# Patient Record
Sex: FEMALE | Race: WHITE | NOT HISPANIC OR LATINO | Employment: FULL TIME | URBAN - METROPOLITAN AREA
[De-identification: names, ages, dates, MRNs, and addresses within clinical notes are randomized per-mention and may not be internally consistent; named-entity substitution may affect disease eponyms.]

---

## 2017-01-20 RX ORDER — LEVOTHYROXINE SODIUM 175 UG/1
TABLET ORAL
Qty: 90 TABLET | Refills: 2 | Status: SHIPPED | OUTPATIENT
Start: 2017-01-20 | End: 2017-04-10

## 2017-01-24 RX ORDER — OMEPRAZOLE 40 MG/1
CAPSULE, DELAYED RELEASE ORAL
Qty: 90 CAPSULE | Refills: 0 | Status: SHIPPED | OUTPATIENT
Start: 2017-01-24 | End: 2019-04-05

## 2017-04-10 ENCOUNTER — OFFICE VISIT (OUTPATIENT)
Dept: NEUROLOGY | Facility: CLINIC | Age: 42
End: 2017-04-10

## 2017-04-10 VITALS
DIASTOLIC BLOOD PRESSURE: 78 MMHG | SYSTOLIC BLOOD PRESSURE: 116 MMHG | WEIGHT: 220 LBS | BODY MASS INDEX: 33.34 KG/M2 | HEIGHT: 68 IN

## 2017-04-10 DIAGNOSIS — G43.009 MIGRAINE WITHOUT AURA AND WITHOUT STATUS MIGRAINOSUS, NOT INTRACTABLE: ICD-10-CM

## 2017-04-10 DIAGNOSIS — R25.1 TREMOR: Primary | ICD-10-CM

## 2017-04-10 PROCEDURE — 99213 OFFICE O/P EST LOW 20 MIN: CPT | Performed by: PSYCHIATRY & NEUROLOGY

## 2017-04-10 RX ORDER — TOPIRAMATE 25 MG/1
75 TABLET ORAL NIGHTLY
Qty: 270 TABLET | Refills: 3 | Status: SHIPPED | OUTPATIENT
Start: 2017-04-10 | End: 2018-03-28 | Stop reason: SDUPTHER

## 2017-04-10 RX ORDER — PROMETHAZINE HYDROCHLORIDE 25 MG/1
25 TABLET ORAL EVERY 6 HOURS PRN
Qty: 30 TABLET | Refills: 2 | Status: SHIPPED | OUTPATIENT
Start: 2017-04-10 | End: 2018-04-06 | Stop reason: SDUPTHER

## 2017-04-10 RX ORDER — LAMOTRIGINE 200 MG/1
200 TABLET ORAL DAILY
Refills: 3 | COMMUNITY
Start: 2017-03-14

## 2017-04-10 RX ORDER — RIZATRIPTAN BENZOATE 5 MG/1
5 TABLET ORAL ONCE AS NEEDED
Qty: 27 TABLET | Refills: 3 | Status: SHIPPED | OUTPATIENT
Start: 2017-04-10 | End: 2018-04-06 | Stop reason: SDUPTHER

## 2017-04-10 RX ORDER — ALPRAZOLAM 0.25 MG/1
0.25 TABLET ORAL 2 TIMES DAILY
Qty: 180 TABLET | Refills: 1 | Status: SHIPPED | OUTPATIENT
Start: 2017-04-10 | End: 2017-11-06 | Stop reason: SDUPTHER

## 2017-04-10 NOTE — PROGRESS NOTES
Subjective:     Patient ID: Altagracia Tiwari is a 42 y.o. female.    Migraine    Associated symptoms include back pain and nausea. Pertinent negatives include no abdominal pain, dizziness, fever, neck pain, numbness, seizures, vomiting or weakness.     The patient's migraines and tremor are fairly well controlled on her current medicine she is also now on Lamictal.     The following portions of the patient's history were reviewed and updated as appropriate: allergies, current medications, past family history, past medical history, past social history, past surgical history and problem list.    Review of Systems   Constitutional: Positive for fatigue. Negative for activity change, appetite change, chills, diaphoresis, fever and unexpected weight change.   Respiratory: Negative for shortness of breath.    Cardiovascular: Negative for chest pain and palpitations.   Gastrointestinal: Positive for nausea. Negative for abdominal pain and vomiting.   Musculoskeletal: Positive for back pain. Negative for neck pain.   Neurological: Positive for headaches. Negative for dizziness, tremors, seizures, syncope, facial asymmetry, speech difficulty, weakness, light-headedness and numbness.   Psychiatric/Behavioral: Negative.  Negative for confusion.        Objective:    Neurologic Exam  Mental status examination was appropriate.  Funduscopy, visual fields, eye movements and pupillary reflexes were normal.  No facial weakness was noted.  Gait was normal.  No pattern of focal weakness was noted.  Physical Exam    Assessment/Plan:     Altagracia was seen today for migraine.    Diagnoses and all orders for this visit:    Tremor    Migraine without aura and without status migrainosus, not intractable    Other orders  -     topiramate (TOPAMAX) 25 MG tablet; Take 3 tablets by mouth Every Night.  -     rizatriptan (MAXALT) 5 MG tablet; Take 1 tablet by mouth 1 (One) Time As Needed for migraine. May repeat in 2 hours if needed  -      promethazine (PHENERGAN) 25 MG tablet; Take 1 tablet by mouth Every 6 (Six) Hours As Needed for Nausea or Vomiting.  -     ALPRAZolam (XANAX) 0.25 MG tablet; Take 1 tablet by mouth 2 (Two) Times a Day.         Prescription drug management - meds as above.    Follow up in the office in one year.Thank you for allowing me to share in the care of this patient.  Chema Edge M.D.

## 2017-04-17 ENCOUNTER — HOSPITAL ENCOUNTER (OUTPATIENT)
Dept: FAMILY MEDICINE CLINIC | Facility: CLINIC | Age: 42
Setting detail: SPECIMEN
Discharge: HOME | End: 2017-04-17
Attending: NURSE PRACTITIONER | Admitting: NURSE PRACTITIONER

## 2017-04-17 LAB
ALBUMIN SERPL-MCNC: 4 G/DL (ref 3.5–4.8)
ALBUMIN/GLOB SERPL: 1.1 {RATIO} (ref 1–1.7)
ALP SERPL-CCNC: 76 IU/L (ref 32–91)
ALT SERPL-CCNC: 27 IU/L (ref 14–54)
ANION GAP SERPL CALC-SCNC: 15.5 MMOL/L (ref 10–20)
AST SERPL-CCNC: 22 IU/L (ref 15–41)
BILIRUB SERPL-MCNC: 0.6 MG/DL (ref 0.3–1.2)
BUN SERPL-MCNC: 14 MG/DL (ref 8–20)
BUN/CREAT SERPL: 11.7 (ref 5.4–26.2)
CALCIUM SERPL-MCNC: 9.6 MG/DL (ref 8.9–10.3)
CHLORIDE SERPL-SCNC: 107 MMOL/L (ref 101–111)
CHOLEST SERPL-MCNC: 182 MG/DL
CHOLEST/HDLC SERPL: 3.8 {RATIO}
CONV CO2: 23 MMOL/L (ref 22–32)
CONV LDL CHOLESTEROL DIRECT: 114 MG/DL (ref 0–100)
CONV TOTAL PROTEIN: 7.8 G/DL (ref 6.1–7.9)
CREAT UR-MCNC: 1.2 MG/DL (ref 0.4–1)
GLOBULIN UR ELPH-MCNC: 3.8 G/DL (ref 2.5–3.8)
GLUCOSE SERPL-MCNC: 92 MG/DL (ref 65–99)
HDLC SERPL-MCNC: 48 MG/DL
LDLC/HDLC SERPL: 2.4 {RATIO}
LIPID INTERPRETATION: ABNORMAL
POTASSIUM SERPL-SCNC: 4.5 MMOL/L (ref 3.6–5.1)
SODIUM SERPL-SCNC: 141 MMOL/L (ref 136–144)
TRIGL SERPL-MCNC: 113 MG/DL
TSH SERPL-ACNC: 2.25 UIU/ML (ref 0.34–5.6)
VLDLC SERPL CALC-MCNC: 20.1 MG/DL

## 2017-07-27 ENCOUNTER — HOSPITAL ENCOUNTER (OUTPATIENT)
Dept: FAMILY MEDICINE CLINIC | Facility: CLINIC | Age: 42
Setting detail: SPECIMEN
Discharge: HOME | End: 2017-07-27
Attending: FAMILY MEDICINE | Admitting: FAMILY MEDICINE

## 2017-07-27 LAB
ALBUMIN SERPL-MCNC: 3.9 G/DL (ref 3.5–4.8)
ALBUMIN/GLOB SERPL: 1.1 {RATIO} (ref 1–1.7)
ALP SERPL-CCNC: 72 IU/L (ref 32–91)
ALT SERPL-CCNC: 22 IU/L (ref 14–54)
ANION GAP SERPL CALC-SCNC: 13.2 MMOL/L (ref 10–20)
AST SERPL-CCNC: 18 IU/L (ref 15–41)
BASOPHILS # BLD AUTO: 0.1 10*3/UL (ref 0–0.2)
BASOPHILS NFR BLD AUTO: 1 % (ref 0–2)
BILIRUB SERPL-MCNC: 0.5 MG/DL (ref 0.3–1.2)
BUN SERPL-MCNC: 12 MG/DL (ref 8–20)
BUN/CREAT SERPL: 12 (ref 5.4–26.2)
CALCIUM SERPL-MCNC: 9.6 MG/DL (ref 8.9–10.3)
CHLORIDE SERPL-SCNC: 106 MMOL/L (ref 101–111)
CONV CO2: 24 MMOL/L (ref 22–32)
CONV TOTAL PROTEIN: 7.5 G/DL (ref 6.1–7.9)
CREAT UR-MCNC: 1 MG/DL (ref 0.4–1)
DIFFERENTIAL METHOD BLD: (no result)
EOSINOPHIL # BLD AUTO: 0.2 10*3/UL (ref 0–0.3)
EOSINOPHIL # BLD AUTO: 3 % (ref 0–3)
ERYTHROCYTE [DISTWIDTH] IN BLOOD BY AUTOMATED COUNT: 15 % (ref 11.5–14.5)
ERYTHROCYTE [SEDIMENTATION RATE] IN BLOOD BY WESTERGREN METHOD: 28 MM/HR (ref 0–20)
GLOBULIN UR ELPH-MCNC: 3.6 G/DL (ref 2.5–3.8)
GLUCOSE SERPL-MCNC: 107 MG/DL (ref 65–99)
HCT VFR BLD AUTO: 38.7 % (ref 35–49)
HGB BLD-MCNC: 12.9 G/DL (ref 12–15)
LYMPHOCYTES # BLD AUTO: 1.6 10*3/UL (ref 0.8–4.8)
LYMPHOCYTES NFR BLD AUTO: 27 % (ref 18–42)
MCH RBC QN AUTO: 27.6 PG (ref 26–32)
MCHC RBC AUTO-ENTMCNC: 33.4 G/DL (ref 32–36)
MCV RBC AUTO: 82.6 FL (ref 80–94)
MONOCYTES # BLD AUTO: 0.6 10*3/UL (ref 0.1–1.3)
MONOCYTES NFR BLD AUTO: 9 % (ref 2–11)
NEUTROPHILS # BLD AUTO: 3.6 10*3/UL (ref 2.3–8.6)
NEUTROPHILS NFR BLD AUTO: 60 % (ref 50–75)
NRBC BLD AUTO-RTO: 0 /100{WBCS}
NRBC/RBC NFR BLD MANUAL: 0 10*3/UL
PLATELET # BLD AUTO: 276 10*3/UL (ref 150–450)
PMV BLD AUTO: 8.4 FL (ref 7.4–10.4)
POTASSIUM SERPL-SCNC: 4.2 MMOL/L (ref 3.6–5.1)
RBC # BLD AUTO: 4.68 10*6/UL (ref 4–5.4)
SODIUM SERPL-SCNC: 139 MMOL/L (ref 136–144)
WBC # BLD AUTO: 6 10*3/UL (ref 4.5–11.5)

## 2017-09-18 RX ORDER — LEVOTHYROXINE SODIUM 175 UG/1
TABLET ORAL
Qty: 30 TABLET | Refills: 0 | Status: SHIPPED | OUTPATIENT
Start: 2017-09-18 | End: 2017-10-26 | Stop reason: SDUPTHER

## 2017-10-26 RX ORDER — LEVOTHYROXINE SODIUM 175 UG/1
TABLET ORAL
Qty: 15 TABLET | Refills: 0 | Status: SHIPPED | OUTPATIENT
Start: 2017-10-26 | End: 2023-02-11

## 2017-10-27 ENCOUNTER — HOSPITAL ENCOUNTER (OUTPATIENT)
Dept: FAMILY MEDICINE CLINIC | Facility: CLINIC | Age: 42
Setting detail: SPECIMEN
Discharge: HOME | End: 2017-10-27
Attending: FAMILY MEDICINE | Admitting: FAMILY MEDICINE

## 2017-11-03 RX ORDER — LEVOTHYROXINE SODIUM 175 UG/1
TABLET ORAL
Qty: 15 TABLET | Refills: 0 | OUTPATIENT
Start: 2017-11-03

## 2017-11-06 RX ORDER — ALPRAZOLAM 0.25 MG/1
TABLET ORAL
Qty: 180 TABLET | Refills: 1 | Status: SHIPPED | OUTPATIENT
Start: 2017-11-06 | End: 2018-04-06 | Stop reason: SDUPTHER

## 2017-11-13 ENCOUNTER — HOSPITAL ENCOUNTER (OUTPATIENT)
Dept: RHEUMATOLOGY | Facility: CLINIC | Age: 42
Discharge: HOME | End: 2017-11-13
Attending: INTERNAL MEDICINE | Admitting: INTERNAL MEDICINE

## 2017-11-13 ENCOUNTER — HOSPITAL ENCOUNTER (OUTPATIENT)
Dept: LAB | Facility: HOSPITAL | Age: 42
Discharge: HOME | End: 2017-11-13
Attending: INTERNAL MEDICINE | Admitting: INTERNAL MEDICINE

## 2017-11-13 LAB
ALBUMIN SERPL-MCNC: 3.8 G/DL (ref 3.5–4.8)
ALBUMIN/GLOB SERPL: 1 {RATIO} (ref 1–1.7)
ALP SERPL-CCNC: 67 IU/L (ref 32–91)
ALT SERPL-CCNC: 19 IU/L (ref 14–54)
ANION GAP SERPL CALC-SCNC: 12.1 MMOL/L (ref 10–20)
AST SERPL-CCNC: 18 IU/L (ref 15–41)
BASOPHILS # BLD AUTO: 0 10*3/UL (ref 0–0.2)
BASOPHILS NFR BLD AUTO: 1 % (ref 0–2)
BILIRUB SERPL-MCNC: 0.5 MG/DL (ref 0.3–1.2)
BILIRUB UR QL STRIP: NEGATIVE MG/DL
BUN SERPL-MCNC: 12 MG/DL (ref 8–20)
BUN/CREAT SERPL: 13.3 (ref 5.4–26.2)
CALCIUM SERPL-MCNC: 9.3 MG/DL (ref 8.9–10.3)
CASTS URNS QL MICRO: ABNORMAL /[LPF]
CHLORIDE SERPL-SCNC: 107 MMOL/L (ref 101–111)
COLOR UR: YELLOW
CONV BACTERIA IN URINE MICRO: NEGATIVE
CONV CLARITY OF URINE: CLEAR
CONV CO2: 22 MMOL/L (ref 22–32)
CONV HYALINE CASTS IN URINE MICRO: 2 /[LPF] (ref 0–5)
CONV PROTEIN IN URINE BY AUTOMATED TEST STRIP: NEGATIVE MG/DL
CONV SMALL ROUND CELLS: ABNORMAL /[HPF]
CONV TOTAL PROTEIN: 7.5 G/DL (ref 6.1–7.9)
CONV UROBILINOGEN IN URINE BY AUTOMATED TEST STRIP: 0.2 MG/DL
CREAT UR-MCNC: 0.9 MG/DL (ref 0.4–1)
CRP SERPL-MCNC: 1.87 MG/DL (ref 0–0.7)
CULTURE INDICATED?: ABNORMAL
DIFFERENTIAL METHOD BLD: (no result)
EOSINOPHIL # BLD AUTO: 0.2 10*3/UL (ref 0–0.3)
EOSINOPHIL # BLD AUTO: 4 % (ref 0–3)
ERYTHROCYTE [DISTWIDTH] IN BLOOD BY AUTOMATED COUNT: 15 % (ref 11.5–14.5)
ERYTHROCYTE [SEDIMENTATION RATE] IN BLOOD BY WESTERGREN METHOD: 46 MM/HR (ref 0–20)
GLOBULIN UR ELPH-MCNC: 3.7 G/DL (ref 2.5–3.8)
GLUCOSE SERPL-MCNC: 84 MG/DL (ref 65–99)
GLUCOSE UR QL: NEGATIVE MG/DL
HCT VFR BLD AUTO: 37.1 % (ref 35–49)
HGB BLD-MCNC: 12.4 G/DL (ref 12–15)
HGB UR QL STRIP: ABNORMAL
KETONES UR QL STRIP: NEGATIVE MG/DL
LEUKOCYTE ESTERASE UR QL STRIP: NEGATIVE
LYMPHOCYTES # BLD AUTO: 1.6 10*3/UL (ref 0.8–4.8)
LYMPHOCYTES NFR BLD AUTO: 31 % (ref 18–42)
MCH RBC QN AUTO: 27.8 PG (ref 26–32)
MCHC RBC AUTO-ENTMCNC: 33.4 G/DL (ref 32–36)
MCV RBC AUTO: 83.1 FL (ref 80–94)
MONOCYTES # BLD AUTO: 0.4 10*3/UL (ref 0.1–1.3)
MONOCYTES NFR BLD AUTO: 8 % (ref 2–11)
NEUTROPHILS # BLD AUTO: 3.1 10*3/UL (ref 2.3–8.6)
NEUTROPHILS NFR BLD AUTO: 56 % (ref 50–75)
NITRITE UR QL STRIP: NEGATIVE
NRBC BLD AUTO-RTO: 0 /100{WBCS}
NRBC/RBC NFR BLD MANUAL: 0 10*3/UL
PH UR STRIP.AUTO: 6.5 [PH] (ref 4.5–8)
PLATELET # BLD AUTO: 326 10*3/UL (ref 150–450)
PMV BLD AUTO: 8.7 FL (ref 7.4–10.4)
POTASSIUM SERPL-SCNC: 4.1 MMOL/L (ref 3.6–5.1)
RBC # BLD AUTO: 4.46 10*6/UL (ref 4–5.4)
RBC #/AREA URNS HPF: 1 /[HPF] (ref 0–3)
SODIUM SERPL-SCNC: 137 MMOL/L (ref 136–144)
SP GR UR: 1.02 (ref 1–1.03)
SPERM URNS QL MICRO: ABNORMAL /[HPF]
SQUAMOUS SPT QL MICRO: 3 /[HPF] (ref 0–5)
UNIDENT CRYS URNS QL MICRO: ABNORMAL /[HPF]
WBC # BLD AUTO: 5.3 10*3/UL (ref 4.5–11.5)
WBC #/AREA URNS HPF: 1 /[HPF] (ref 0–5)
YEAST SPEC QL WET PREP: ABNORMAL /[HPF]

## 2017-12-02 ENCOUNTER — HOSPITAL ENCOUNTER (OUTPATIENT)
Dept: MRI IMAGING | Facility: HOSPITAL | Age: 42
Discharge: HOME | End: 2017-12-02
Attending: ORTHOPAEDIC SURGERY | Admitting: ORTHOPAEDIC SURGERY

## 2018-01-19 ENCOUNTER — HOSPITAL ENCOUNTER (OUTPATIENT)
Dept: MAMMOGRAPHY | Facility: HOSPITAL | Age: 43
Discharge: HOME | End: 2018-01-19
Attending: FAMILY MEDICINE | Admitting: FAMILY MEDICINE

## 2018-03-09 ENCOUNTER — HOSPITAL ENCOUNTER (OUTPATIENT)
Dept: LAB | Facility: HOSPITAL | Age: 43
Discharge: HOME | End: 2018-03-09
Attending: INTERNAL MEDICINE | Admitting: INTERNAL MEDICINE

## 2018-03-28 RX ORDER — TOPIRAMATE 25 MG/1
75 TABLET ORAL NIGHTLY
Qty: 270 TABLET | Refills: 0 | Status: SHIPPED | OUTPATIENT
Start: 2018-03-28 | End: 2018-04-06 | Stop reason: SDUPTHER

## 2018-04-06 ENCOUNTER — OFFICE VISIT (OUTPATIENT)
Dept: NEUROLOGY | Facility: CLINIC | Age: 43
End: 2018-04-06

## 2018-04-06 VITALS
BODY MASS INDEX: 33.95 KG/M2 | HEIGHT: 68 IN | DIASTOLIC BLOOD PRESSURE: 80 MMHG | SYSTOLIC BLOOD PRESSURE: 120 MMHG | WEIGHT: 224 LBS

## 2018-04-06 DIAGNOSIS — G43.009 MIGRAINE WITHOUT AURA AND WITHOUT STATUS MIGRAINOSUS, NOT INTRACTABLE: ICD-10-CM

## 2018-04-06 DIAGNOSIS — R25.1 TREMOR: Primary | ICD-10-CM

## 2018-04-06 PROCEDURE — 99213 OFFICE O/P EST LOW 20 MIN: CPT | Performed by: PSYCHIATRY & NEUROLOGY

## 2018-04-06 RX ORDER — TRAZODONE HYDROCHLORIDE 100 MG/1
100 TABLET ORAL NIGHTLY
Refills: 0 | COMMUNITY
Start: 2018-03-20

## 2018-04-06 RX ORDER — TOPIRAMATE 25 MG/1
75 TABLET ORAL NIGHTLY
Qty: 270 TABLET | Refills: 3 | Status: SHIPPED | OUTPATIENT
Start: 2018-04-06 | End: 2018-04-17 | Stop reason: SDUPTHER

## 2018-04-06 RX ORDER — RIZATRIPTAN BENZOATE 5 MG/1
5 TABLET ORAL ONCE AS NEEDED
Qty: 27 TABLET | Refills: 3 | Status: SHIPPED | OUTPATIENT
Start: 2018-04-06 | End: 2018-04-17 | Stop reason: SDUPTHER

## 2018-04-06 RX ORDER — PROMETHAZINE HYDROCHLORIDE 25 MG/1
25 TABLET ORAL EVERY 6 HOURS PRN
Qty: 30 TABLET | Refills: 2 | Status: SHIPPED | OUTPATIENT
Start: 2018-04-06 | End: 2019-04-05 | Stop reason: SDUPTHER

## 2018-04-06 RX ORDER — ALPRAZOLAM 0.25 MG/1
0.25 TABLET ORAL 2 TIMES DAILY
Qty: 180 TABLET | Refills: 1 | Status: SHIPPED | OUTPATIENT
Start: 2018-04-06 | End: 2018-06-07 | Stop reason: SDUPTHER

## 2018-04-06 RX ORDER — ACETAMINOPHEN 500 MG
500 TABLET ORAL EVERY 6 HOURS PRN
COMMUNITY

## 2018-04-06 NOTE — PROGRESS NOTES
Subjective:     Patient ID: Altagracia Tiwari is a 43 y.o. female.    History of Present Illness     The patient was seen back in the office for follow-up of migraine and tremor.  Her tremor is still present but tolerable.  She did not tolerate propanolol in the past.  High-grade is under control with topiramate and Maxalt.  She does use Phenergan as a rescue.  The following portions of the patient's history were reviewed and updated as appropriate: allergies, current medications, past family history, past medical history, past social history, past surgical history and problem list.      Current Outpatient Prescriptions:   •  acetaminophen (TYLENOL) 500 MG tablet, Take 500 mg by mouth Every 6 (Six) Hours As Needed for Mild Pain ., Disp: , Rfl:   •  ADVAIR DISKUS 250-50 MCG/DOSE DISKUS, INHALE 1 PUFF BY MOUTH TWICE DAILY, Disp: 60 each, Rfl: 0  •  albuterol (ACCUNEB) 0.63 MG/3ML nebulizer solution, Take 3 mL by nebulization Every 6 (Six) Hours As Needed for wheezing., Disp: 3 mL, Rfl: 12  •  ALPRAZolam (XANAX) 0.25 MG tablet, Take 1 tablet by mouth 2 (Two) Times a Day., Disp: 180 tablet, Rfl: 1  •  Cholecalciferol (VITAMIN D3) 5000 UNITS tablet, Take by mouth., Disp: , Rfl:   •  Etanercept (ENBREL SURECLICK) 50 MG/ML solution auto-injector, Inject  under the skin., Disp: , Rfl:   •  lamoTRIgine (LaMICtal) 100 MG tablet, TK 1 T PO QD AT DINNER TIME, Disp: , Rfl: 3  •  levothyroxine (SYNTHROID, LEVOTHROID) 175 MCG tablet, TAKE 1 TABLET BY MOUTH EVERY DAY( NEED TO BE SEEN FOR FURTHER REFILLS), Disp: 15 tablet, Rfl: 0  •  naproxen (NAPROSYN) 500 MG tablet, Take 1 tablet by mouth 2 (two) times a day as needed for mild pain (1-3)., Disp: 28 tablet, Rfl: 0  •  NASACORT ALLERGY 24HR 55 MCG/ACT nasal inhaler, INSTILL 2 SPRAYS IEN ONCE DAILY, Disp: , Rfl: 0  •  omeprazole (priLOSEC) 40 MG capsule, TAKE 1 CAPSULE BY MOUTH EVERY DAY, Disp: 90 capsule, Rfl: 0  •  promethazine (PHENERGAN) 25 MG tablet, Take 1 tablet by mouth Every 6  (Six) Hours As Needed for Nausea or Vomiting., Disp: 30 tablet, Rfl: 2  •  rizatriptan (MAXALT) 5 MG tablet, Take 1 tablet by mouth 1 (One) Time As Needed for Migraine. May repeat in 2 hours if needed, Disp: 27 tablet, Rfl: 3  •  topiramate (TOPAMAX) 25 MG tablet, Take 3 tablets by mouth Every Night., Disp: 270 tablet, Rfl: 3  •  traZODone (DESYREL) 100 MG tablet, TK 1 T PO QHS, Disp: , Rfl: 0  •  venlafaxine XR (EFFEXOR-XR) 150 MG 24 hr capsule, , Disp: , Rfl: 1  •  VENTOLIN  (90 BASE) MCG/ACT inhaler, INHALE 1 TO 2 PUFFS BY MOUTH EVERY 6 HOURS AS NEEDED, Disp: 18 g, Rfl: 0    Review of Systems   Constitutional: Negative.    Neurological: Negative.    Psychiatric/Behavioral: Negative.         Objective:    Neurologic Exam  Mental status examination was appropriate.  Funduscopy, visual fields, eye movements and pupillary reflexes were normal.  No facial weakness was noted.  Gait was normal.  No pattern of focal weakness was noted.  Mild to moderate low amplitude high frequency tremor of hands.  Physical Exam    Assessment/Plan:     Altagracia was seen today for tremors and migraine.    Diagnoses and all orders for this visit:    Tremor    Migraine without aura and without status migrainosus, not intractable    Other orders  -     topiramate (TOPAMAX) 25 MG tablet; Take 3 tablets by mouth Every Night.  -     promethazine (PHENERGAN) 25 MG tablet; Take 1 tablet by mouth Every 6 (Six) Hours As Needed for Nausea or Vomiting.  -     rizatriptan (MAXALT) 5 MG tablet; Take 1 tablet by mouth 1 (One) Time As Needed for Migraine. May repeat in 2 hours if needed  -     ALPRAZolam (XANAX) 0.25 MG tablet; Take 1 tablet by mouth 2 (Two) Times a Day.         Prescription drug management - meds as above    Follow-up in the office in one year. Thank you for allowing me to share in the care of this patient.  Chema Edge M.D.

## 2018-04-14 ENCOUNTER — APPOINTMENT (OUTPATIENT)
Dept: GENERAL RADIOLOGY | Facility: HOSPITAL | Age: 43
End: 2018-04-14

## 2018-04-14 PROCEDURE — 73610 X-RAY EXAM OF ANKLE: CPT | Performed by: EMERGENCY MEDICINE

## 2018-04-16 ENCOUNTER — OFFICE VISIT (OUTPATIENT)
Dept: ORTHOPEDIC SURGERY | Facility: CLINIC | Age: 43
End: 2018-04-16

## 2018-04-16 VITALS — HEIGHT: 68 IN | WEIGHT: 224 LBS | BODY MASS INDEX: 33.95 KG/M2 | TEMPERATURE: 97.4 F

## 2018-04-16 DIAGNOSIS — M25.371 INSTABILITY OF RIGHT ANKLE JOINT: ICD-10-CM

## 2018-04-16 DIAGNOSIS — S93.401A SPRAIN OF RIGHT ANKLE, UNSPECIFIED LIGAMENT, INITIAL ENCOUNTER: ICD-10-CM

## 2018-04-16 DIAGNOSIS — S86.301A INJURY OF PERONEAL TENDON OF RIGHT FOOT, INITIAL ENCOUNTER: ICD-10-CM

## 2018-04-16 DIAGNOSIS — M79.671 PAIN OF RIGHT HEEL: Primary | ICD-10-CM

## 2018-04-16 PROCEDURE — 99204 OFFICE O/P NEW MOD 45 MIN: CPT | Performed by: ORTHOPAEDIC SURGERY
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
## 2018-04-17 RX ORDER — RIZATRIPTAN BENZOATE 5 MG/1
5 TABLET ORAL ONCE AS NEEDED
Qty: 27 TABLET | Refills: 3 | Status: SHIPPED | OUTPATIENT
Start: 2018-04-17 | End: 2019-04-05 | Stop reason: SDUPTHER

## 2018-04-17 RX ORDER — TOPIRAMATE 25 MG/1
75 TABLET ORAL NIGHTLY
Qty: 270 TABLET | Refills: 3 | Status: SHIPPED | OUTPATIENT
Start: 2018-04-17 | End: 2019-04-05 | Stop reason: SDUPTHER

## 2018-04-20 ENCOUNTER — HOSPITAL ENCOUNTER (OUTPATIENT)
Dept: MRI IMAGING | Facility: HOSPITAL | Age: 43
Discharge: HOME | End: 2018-04-20
Attending: ORTHOPAEDIC SURGERY | Admitting: ORTHOPAEDIC SURGERY

## 2018-04-20 DIAGNOSIS — M79.671 PAIN OF RIGHT HEEL: ICD-10-CM

## 2018-04-20 DIAGNOSIS — S93.401A SPRAIN OF RIGHT ANKLE, UNSPECIFIED LIGAMENT, INITIAL ENCOUNTER: ICD-10-CM

## 2018-04-20 DIAGNOSIS — S86.301A INJURY OF PERONEAL TENDON OF RIGHT FOOT, INITIAL ENCOUNTER: ICD-10-CM

## 2018-04-20 DIAGNOSIS — M25.371 INSTABILITY OF RIGHT ANKLE JOINT: ICD-10-CM

## 2018-04-20 PROCEDURE — 73721 MRI JNT OF LWR EXTRE W/O DYE: CPT

## 2018-04-25 ENCOUNTER — HOSPITAL ENCOUNTER (OUTPATIENT)
Dept: FAMILY MEDICINE CLINIC | Facility: CLINIC | Age: 43
Setting detail: SPECIMEN
Discharge: HOME | End: 2018-04-25
Attending: FAMILY MEDICINE | Admitting: FAMILY MEDICINE

## 2018-05-02 ENCOUNTER — OFFICE VISIT (OUTPATIENT)
Dept: ORTHOPEDIC SURGERY | Facility: CLINIC | Age: 43
End: 2018-05-02

## 2018-05-02 VITALS — TEMPERATURE: 98.1 F | BODY MASS INDEX: 33.95 KG/M2 | HEIGHT: 68 IN | WEIGHT: 224 LBS

## 2018-05-02 DIAGNOSIS — S93.401D SPRAIN OF RIGHT ANKLE, UNSPECIFIED LIGAMENT, SUBSEQUENT ENCOUNTER: ICD-10-CM

## 2018-05-02 DIAGNOSIS — M19.079 ARTHRITIS OF ANKLE: ICD-10-CM

## 2018-05-02 DIAGNOSIS — Q66.89 COALITION, TALOCALCANEAL: Primary | ICD-10-CM

## 2018-05-02 DIAGNOSIS — M24.071 LOOSE BODY OF RIGHT ANKLE: ICD-10-CM

## 2018-05-02 PROCEDURE — 99213 OFFICE O/P EST LOW 20 MIN: CPT | Performed by: ORTHOPAEDIC SURGERY
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
## 2018-05-11 ENCOUNTER — APPOINTMENT (OUTPATIENT)
Dept: GENERAL RADIOLOGY | Facility: HOSPITAL | Age: 43
End: 2018-05-11
Attending: ORTHOPAEDIC SURGERY

## 2018-05-15 ENCOUNTER — HOSPITAL ENCOUNTER (OUTPATIENT)
Dept: GENERAL RADIOLOGY | Facility: HOSPITAL | Age: 43
Discharge: HOME | End: 2018-05-15
Attending: ORTHOPAEDIC SURGERY | Admitting: ORTHOPAEDIC SURGERY

## 2018-05-15 DIAGNOSIS — Q66.89 COALITION, TALOCALCANEAL: ICD-10-CM

## 2018-05-15 PROCEDURE — 77002 NEEDLE LOCALIZATION BY XRAY: CPT

## 2018-05-15 PROCEDURE — 25010000002 IOPAMIDOL 61 % SOLUTION: Performed by: RADIOLOGY

## 2018-05-15 PROCEDURE — 25010000002 METHYLPREDNISOLONE PER 125 MG: Performed by: RADIOLOGY

## 2018-05-15 RX ORDER — BUPIVACAINE HYDROCHLORIDE 2.5 MG/ML
10 INJECTION, SOLUTION EPIDURAL; INFILTRATION; INTRACAUDAL ONCE
Status: COMPLETED | OUTPATIENT
Start: 2018-05-15 | End: 2018-05-15

## 2018-05-15 RX ORDER — LIDOCAINE HYDROCHLORIDE 10 MG/ML
10 INJECTION, SOLUTION INFILTRATION; PERINEURAL ONCE
Status: COMPLETED | OUTPATIENT
Start: 2018-05-15 | End: 2018-05-15

## 2018-05-15 RX ORDER — METHYLPREDNISOLONE SODIUM SUCCINATE 125 MG/2ML
80 INJECTION, POWDER, LYOPHILIZED, FOR SOLUTION INTRAMUSCULAR; INTRAVENOUS
Status: COMPLETED | OUTPATIENT
Start: 2018-05-15 | End: 2018-05-15

## 2018-05-15 RX ADMIN — BUPIVACAINE HYDROCHLORIDE 3 ML: 2.5 INJECTION, SOLUTION EPIDURAL; INFILTRATION; INTRACAUDAL; PERINEURAL at 11:41

## 2018-05-15 RX ADMIN — LIDOCAINE HYDROCHLORIDE 1 ML: 10 INJECTION, SOLUTION INFILTRATION; PERINEURAL at 11:41

## 2018-05-15 RX ADMIN — METHYLPREDNISOLONE SODIUM SUCCINATE 80 MG: 125 INJECTION, POWDER, LYOPHILIZED, FOR SOLUTION INTRAMUSCULAR; INTRAVENOUS at 11:41

## 2018-05-15 RX ADMIN — IOPAMIDOL 1 ML: 612 INJECTION, SOLUTION INTRAVENOUS at 11:41

## 2018-06-04 ENCOUNTER — OFFICE VISIT (OUTPATIENT)
Dept: ORTHOPEDIC SURGERY | Facility: CLINIC | Age: 43
End: 2018-06-04

## 2018-06-04 VITALS — TEMPERATURE: 97.1 F | HEIGHT: 68 IN | WEIGHT: 224.2 LBS | BODY MASS INDEX: 33.98 KG/M2

## 2018-06-04 DIAGNOSIS — M24.071 LOOSE BODY OF RIGHT ANKLE: ICD-10-CM

## 2018-06-04 DIAGNOSIS — Q66.89 COALITION, TALOCALCANEAL: ICD-10-CM

## 2018-06-04 DIAGNOSIS — M19.079 ARTHRITIS OF ANKLE: ICD-10-CM

## 2018-06-04 DIAGNOSIS — S93.401D SPRAIN OF RIGHT ANKLE, UNSPECIFIED LIGAMENT, SUBSEQUENT ENCOUNTER: Primary | ICD-10-CM

## 2018-06-04 PROCEDURE — 99213 OFFICE O/P EST LOW 20 MIN: CPT | Performed by: ORTHOPAEDIC SURGERY
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
## 2018-06-07 RX ORDER — ALPRAZOLAM 0.25 MG/1
0.25 TABLET ORAL 2 TIMES DAILY
Qty: 180 TABLET | Refills: 1 | Status: SHIPPED | OUTPATIENT
Start: 2018-06-07 | End: 2019-01-03 | Stop reason: SDUPTHER

## 2018-06-07 NOTE — TELEPHONE ENCOUNTER
Please duo this. Patient is changing pharmacies and needs new RX. I have called former pharmacy to cx previous rx

## 2018-06-14 ENCOUNTER — HOSPITAL ENCOUNTER (OUTPATIENT)
Dept: LAB | Facility: HOSPITAL | Age: 43
Discharge: HOME | End: 2018-06-14
Attending: INTERNAL MEDICINE | Admitting: INTERNAL MEDICINE

## 2018-06-14 LAB
ALBUMIN SERPL-MCNC: 3.8 G/DL (ref 3.5–4.8)
ALBUMIN/GLOB SERPL: 0.9 {RATIO} (ref 1–1.7)
ALP SERPL-CCNC: 80 IU/L (ref 32–91)
ALT SERPL-CCNC: 16 IU/L (ref 14–54)
ANION GAP SERPL CALC-SCNC: 9 MMOL/L (ref 10–20)
AST SERPL-CCNC: 17 IU/L (ref 15–41)
BACTERIA SPEC AEROBE CULT: NORMAL
BASOPHILS # BLD AUTO: 0 10*3/UL (ref 0–0.2)
BASOPHILS NFR BLD AUTO: 1 % (ref 0–2)
BILIRUB SERPL-MCNC: 0.4 MG/DL (ref 0.3–1.2)
BILIRUB UR QL STRIP: NEGATIVE MG/DL
BUN SERPL-MCNC: 14 MG/DL (ref 8–20)
BUN/CREAT SERPL: 15.6 (ref 5.4–26.2)
CALCIUM SERPL-MCNC: 9.6 MG/DL (ref 8.9–10.3)
CASTS URNS QL MICRO: ABNORMAL /[LPF]
CHLORIDE SERPL-SCNC: 106 MMOL/L (ref 101–111)
COLOR UR: YELLOW
CONV BACTERIA IN URINE MICRO: ABNORMAL
CONV CLARITY OF URINE: ABNORMAL
CONV CO2: 26 MMOL/L (ref 22–32)
CONV HYALINE CASTS IN URINE MICRO: ABNORMAL /[LPF] (ref 0–5)
CONV PROTEIN IN URINE BY AUTOMATED TEST STRIP: NEGATIVE MG/DL
CONV SMALL ROUND CELLS: ABNORMAL /[HPF]
CONV TOTAL PROTEIN: 8.1 G/DL (ref 6.1–7.9)
CONV UROBILINOGEN IN URINE BY AUTOMATED TEST STRIP: 1 MG/DL
CREAT UR-MCNC: 0.9 MG/DL (ref 0.4–1)
CRP SERPL-MCNC: 2.08 MG/DL (ref 0–0.7)
CULTURE INDICATED?: ABNORMAL
DIFFERENTIAL METHOD BLD: (no result)
EOSINOPHIL # BLD AUTO: 0.1 10*3/UL (ref 0–0.3)
EOSINOPHIL # BLD AUTO: 2 % (ref 0–3)
ERYTHROCYTE [DISTWIDTH] IN BLOOD BY AUTOMATED COUNT: 14.7 % (ref 11.5–14.5)
ERYTHROCYTE [SEDIMENTATION RATE] IN BLOOD BY WESTERGREN METHOD: 70 MM/HR (ref 0–20)
GLOBULIN UR ELPH-MCNC: 4.3 G/DL (ref 2.5–3.8)
GLUCOSE SERPL-MCNC: 81 MG/DL (ref 65–99)
GLUCOSE UR QL: NEGATIVE MG/DL
HCT VFR BLD AUTO: 36.3 % (ref 35–49)
HGB BLD-MCNC: 12 G/DL (ref 12–15)
HGB UR QL STRIP: NEGATIVE
KETONES UR QL STRIP: NEGATIVE MG/DL
LEUKOCYTE ESTERASE UR QL STRIP: NEGATIVE
LYMPHOCYTES # BLD AUTO: 1.7 10*3/UL (ref 0.8–4.8)
LYMPHOCYTES NFR BLD AUTO: 26 % (ref 18–42)
Lab: NORMAL
MCH RBC QN AUTO: 26.9 PG (ref 26–32)
MCHC RBC AUTO-ENTMCNC: 33.1 G/DL (ref 32–36)
MCV RBC AUTO: 81.5 FL (ref 80–94)
MICRO REPORT STATUS: NORMAL
MONOCYTES # BLD AUTO: 0.5 10*3/UL (ref 0.1–1.3)
MONOCYTES NFR BLD AUTO: 8 % (ref 2–11)
NEUTROPHILS # BLD AUTO: 4.2 10*3/UL (ref 2.3–8.6)
NEUTROPHILS NFR BLD AUTO: 63 % (ref 50–75)
NITRITE UR QL STRIP: NEGATIVE
NRBC BLD AUTO-RTO: 0 /100{WBCS}
NRBC/RBC NFR BLD MANUAL: 0 10*3/UL
PH UR STRIP.AUTO: 6 [PH] (ref 4.5–8)
PLATELET # BLD AUTO: 376 10*3/UL (ref 150–450)
PMV BLD AUTO: 8 FL (ref 7.4–10.4)
POTASSIUM SERPL-SCNC: 4 MMOL/L (ref 3.6–5.1)
RBC # BLD AUTO: 4.45 10*6/UL (ref 4–5.4)
RBC #/AREA URNS HPF: 2 /[HPF] (ref 0–3)
SODIUM SERPL-SCNC: 137 MMOL/L (ref 136–144)
SP GR UR: 1.02 (ref 1–1.03)
SPECIMEN SOURCE: NORMAL
SPERM URNS QL MICRO: ABNORMAL /[HPF]
SQUAMOUS SPT QL MICRO: 7 /[HPF] (ref 0–5)
UNIDENT CRYS URNS QL MICRO: ABNORMAL /[HPF]
WBC # BLD AUTO: 6.5 10*3/UL (ref 4.5–11.5)
WBC #/AREA URNS HPF: 6 /[HPF] (ref 0–5)
YEAST SPEC QL WET PREP: ABNORMAL /[HPF]

## 2018-06-14 PROCEDURE — 86481 TB AG RESPONSE T-CELL SUSP: CPT

## 2018-06-15 ENCOUNTER — LAB REQUISITION (OUTPATIENT)
Dept: LAB | Facility: HOSPITAL | Age: 43
End: 2018-06-15

## 2018-06-15 DIAGNOSIS — Z00.00 ROUTINE GENERAL MEDICAL EXAMINATION AT A HEALTH CARE FACILITY: ICD-10-CM

## 2018-06-15 LAB
HAV IGM SERPL QL IA: NONREACTIVE
HBV CORE IGM SERPL QL IA: NONREACTIVE
HBV SURFACE AG SERPL QL IA: NONREACTIVE
HCV AB SER DONR QL: NORMAL
HCV AB SER DONR QL: NORMAL

## 2018-06-16 LAB
TSPOT INTERPRETATION: NEGATIVE
TSPOT NIL CONTROL INTERPRETATION: NORMAL
TSPOT PANEL A: 0
TSPOT PANEL B: 0
TSPOT POS CONTROL INTERPRETATION: NORMAL

## 2018-06-18 LAB — TSPOT INTERPRETATION: NORMAL

## 2018-10-25 ENCOUNTER — HOSPITAL ENCOUNTER (OUTPATIENT)
Dept: LAB | Facility: HOSPITAL | Age: 43
Discharge: HOME | End: 2018-10-25
Attending: INTERNAL MEDICINE | Admitting: INTERNAL MEDICINE

## 2018-10-25 LAB
ALBUMIN SERPL-MCNC: 3.5 G/DL (ref 3.5–4.8)
ALBUMIN/GLOB SERPL: 1 {RATIO} (ref 1–1.7)
ALP SERPL-CCNC: 78 IU/L (ref 32–91)
ALT SERPL-CCNC: 18 IU/L (ref 14–54)
ANION GAP SERPL CALC-SCNC: 11.1 MMOL/L (ref 10–20)
AST SERPL-CCNC: 16 IU/L (ref 15–41)
BASOPHILS # BLD AUTO: 0 10*3/UL (ref 0–0.2)
BASOPHILS NFR BLD AUTO: 1 % (ref 0–2)
BILIRUB SERPL-MCNC: 0.4 MG/DL (ref 0.3–1.2)
BILIRUB UR QL STRIP: NEGATIVE MG/DL
BUN SERPL-MCNC: 10 MG/DL (ref 8–20)
BUN/CREAT SERPL: 12.5 (ref 5.4–26.2)
CALCIUM SERPL-MCNC: 9.1 MG/DL (ref 8.9–10.3)
CASTS URNS QL MICRO: NORMAL /[LPF]
CHLORIDE SERPL-SCNC: 108 MMOL/L (ref 101–111)
COLOR UR: YELLOW
CONV BACTERIA IN URINE MICRO: NEGATIVE
CONV CLARITY OF URINE: CLEAR
CONV CO2: 25 MMOL/L (ref 22–32)
CONV HYALINE CASTS IN URINE MICRO: 2 /[LPF] (ref 0–5)
CONV PROTEIN IN URINE BY AUTOMATED TEST STRIP: NEGATIVE MG/DL
CONV SMALL ROUND CELLS: NORMAL /[HPF]
CONV TOTAL PROTEIN: 7.1 G/DL (ref 6.1–7.9)
CONV UROBILINOGEN IN URINE BY AUTOMATED TEST STRIP: 0.2 MG/DL
CREAT UR-MCNC: 0.8 MG/DL (ref 0.4–1)
CRP SERPL-MCNC: 0.54 MG/DL (ref 0–0.7)
CULTURE INDICATED?: NORMAL
DIFFERENTIAL METHOD BLD: (no result)
EOSINOPHIL # BLD AUTO: 0.1 10*3/UL (ref 0–0.3)
EOSINOPHIL # BLD AUTO: 3 % (ref 0–3)
ERYTHROCYTE [DISTWIDTH] IN BLOOD BY AUTOMATED COUNT: 15 % (ref 11.5–14.5)
ERYTHROCYTE [SEDIMENTATION RATE] IN BLOOD BY WESTERGREN METHOD: 34 MM/HR (ref 0–20)
GLOBULIN UR ELPH-MCNC: 3.6 G/DL (ref 2.5–3.8)
GLUCOSE SERPL-MCNC: 98 MG/DL (ref 65–99)
GLUCOSE UR QL: NEGATIVE MG/DL
HAV IGM SERPL QL IA: NONREACTIVE
HBV CORE IGM SERPL QL IA: NONREACTIVE
HBV SURFACE AG SERPL QL IA: NONREACTIVE
HCT VFR BLD AUTO: 36.9 % (ref 35–49)
HCV AB SER DONR QL: NONREACTIVE
HGB BLD-MCNC: 12 G/DL (ref 12–15)
HGB UR QL STRIP: NEGATIVE
KETONES UR QL STRIP: NEGATIVE MG/DL
LEUKOCYTE ESTERASE UR QL STRIP: NEGATIVE
LYMPHOCYTES # BLD AUTO: 1.2 10*3/UL (ref 0.8–4.8)
LYMPHOCYTES NFR BLD AUTO: 26 % (ref 18–42)
MCH RBC QN AUTO: 26.8 PG (ref 26–32)
MCHC RBC AUTO-ENTMCNC: 32.6 G/DL (ref 32–36)
MCV RBC AUTO: 82.2 FL (ref 80–94)
MONOCYTES # BLD AUTO: 0.5 10*3/UL (ref 0.1–1.3)
MONOCYTES NFR BLD AUTO: 10 % (ref 2–11)
NEUTROPHILS # BLD AUTO: 2.8 10*3/UL (ref 2.3–8.6)
NEUTROPHILS NFR BLD AUTO: 60 % (ref 50–75)
NITRITE UR QL STRIP: NEGATIVE
NRBC BLD AUTO-RTO: 0 /100{WBCS}
NRBC/RBC NFR BLD MANUAL: 0 10*3/UL
PH UR STRIP.AUTO: 7.5 [PH] (ref 4.5–8)
PLATELET # BLD AUTO: 294 10*3/UL (ref 150–450)
PMV BLD AUTO: 8.7 FL (ref 7.4–10.4)
POTASSIUM SERPL-SCNC: 4.1 MMOL/L (ref 3.6–5.1)
RBC # BLD AUTO: 4.49 10*6/UL (ref 4–5.4)
RBC #/AREA URNS HPF: 0 /[HPF] (ref 0–3)
SODIUM SERPL-SCNC: 140 MMOL/L (ref 136–144)
SP GR UR: 1.01 (ref 1–1.03)
SPERM URNS QL MICRO: NORMAL /[HPF]
SQUAMOUS SPT QL MICRO: 2 /[HPF] (ref 0–5)
UNIDENT CRYS URNS QL MICRO: NORMAL /[HPF]
WBC # BLD AUTO: 4.7 10*3/UL (ref 4.5–11.5)
WBC #/AREA URNS HPF: 0 /[HPF] (ref 0–5)
YEAST SPEC QL WET PREP: NORMAL /[HPF]

## 2018-10-25 PROCEDURE — 86481 TB AG RESPONSE T-CELL SUSP: CPT

## 2018-10-26 ENCOUNTER — LAB REQUISITION (OUTPATIENT)
Dept: LAB | Facility: HOSPITAL | Age: 43
End: 2018-10-26

## 2018-10-26 DIAGNOSIS — Z00.00 ROUTINE GENERAL MEDICAL EXAMINATION AT A HEALTH CARE FACILITY: ICD-10-CM

## 2018-10-27 LAB
TSPOT INTERPRETATION: NEGATIVE
TSPOT NIL CONTROL INTERPRETATION: NORMAL
TSPOT PANEL A: 1
TSPOT PANEL B: 0
TSPOT POS CONTROL INTERPRETATION: NORMAL

## 2018-10-29 LAB — TSPOT INTERPRETATION: NEGATIVE

## 2018-12-31 ENCOUNTER — TELEPHONE (OUTPATIENT)
Dept: ORTHOPEDIC SURGERY | Facility: CLINIC | Age: 43
End: 2018-12-31

## 2018-12-31 NOTE — TELEPHONE ENCOUNTER
I spoke with patient she said that her ankle continues to do well and needs a work note that she may work without restrictions regarding the right ankle.  Also reviewed with her that as a had not seen her for her knee formally that I could not comment on that as far as work restrictions she said she understands and we will mail a work note to her regarding her right ankle

## 2018-12-31 NOTE — TELEPHONE ENCOUNTER
Regarding: Non-Urgent Medical Question  Contact: [phone]  ----- Message from Mychart, Generic sent at 12/29/2018  1:04 PM EST -----    I am taking a new position at [employer] and they are requesting a statement regarding my ankle that I have no work restrictions.  Also since I have not seen you regarding my knee would you be able to include this  in the statement also?  Let me know if you can do this for me it would be great.     Thanks,    Altagracia Tiwari

## 2019-01-04 RX ORDER — ALPRAZOLAM 0.25 MG/1
TABLET ORAL
Qty: 180 TABLET | Refills: 1 | Status: SHIPPED | OUTPATIENT
Start: 2019-01-04 | End: 2019-04-05 | Stop reason: SDUPTHER

## 2019-01-07 ENCOUNTER — TELEPHONE (OUTPATIENT)
Dept: NEUROLOGY | Facility: CLINIC | Age: 44
End: 2019-01-07

## 2019-01-07 NOTE — TELEPHONE ENCOUNTER
----- Message from Isaura Jones sent at 1/7/2019 12:33 PM EST -----  Contact: [phone]  Altagracia needs a work note stating that she is not restricted by her tremors or migraines.

## 2019-03-19 ENCOUNTER — HOSPITAL ENCOUNTER (OUTPATIENT)
Dept: LAB | Facility: HOSPITAL | Age: 44
Discharge: HOME | End: 2019-03-19
Attending: INTERNAL MEDICINE | Admitting: INTERNAL MEDICINE

## 2019-03-19 PROCEDURE — 86481 TB AG RESPONSE T-CELL SUSP: CPT

## 2019-03-20 ENCOUNTER — LAB REQUISITION (OUTPATIENT)
Dept: LAB | Facility: HOSPITAL | Age: 44
End: 2019-03-20

## 2019-03-20 DIAGNOSIS — Z00.00 ROUTINE GENERAL MEDICAL EXAMINATION AT A HEALTH CARE FACILITY: ICD-10-CM

## 2019-03-21 LAB
TSPOT INTERPRETATION: NEGATIVE
TSPOT INTERPRETATION: NORMAL
TSPOT NIL CONTROL INTERPRETATION: NORMAL
TSPOT PANEL A: 0
TSPOT PANEL B: 0
TSPOT POS CONTROL INTERPRETATION: NORMAL

## 2019-04-05 ENCOUNTER — OFFICE VISIT (OUTPATIENT)
Dept: NEUROLOGY | Facility: CLINIC | Age: 44
End: 2019-04-05

## 2019-04-05 VITALS
SYSTOLIC BLOOD PRESSURE: 105 MMHG | DIASTOLIC BLOOD PRESSURE: 60 MMHG | BODY MASS INDEX: 34.86 KG/M2 | WEIGHT: 230 LBS | HEIGHT: 68 IN

## 2019-04-05 DIAGNOSIS — R25.1 TREMOR: Primary | ICD-10-CM

## 2019-04-05 PROCEDURE — 99213 OFFICE O/P EST LOW 20 MIN: CPT | Performed by: PSYCHIATRY & NEUROLOGY

## 2019-04-05 RX ORDER — ALPRAZOLAM 0.25 MG/1
0.25 TABLET ORAL 2 TIMES DAILY
Qty: 180 TABLET | Refills: 1 | Status: SHIPPED | OUTPATIENT
Start: 2019-04-05 | End: 2019-12-05 | Stop reason: SDUPTHER

## 2019-04-05 RX ORDER — PROMETHAZINE HYDROCHLORIDE 25 MG/1
25 TABLET ORAL EVERY 6 HOURS PRN
Qty: 30 TABLET | Refills: 2 | Status: SHIPPED | OUTPATIENT
Start: 2019-04-05 | End: 2020-05-19 | Stop reason: SDUPTHER

## 2019-04-05 RX ORDER — ESOMEPRAZOLE MAGNESIUM 40 MG/1
40 CAPSULE, DELAYED RELEASE ORAL
COMMUNITY

## 2019-04-05 RX ORDER — TOPIRAMATE 25 MG/1
75 TABLET ORAL NIGHTLY
Qty: 270 TABLET | Refills: 3 | Status: SHIPPED | OUTPATIENT
Start: 2019-04-05 | End: 2020-04-16 | Stop reason: SDUPTHER

## 2019-04-05 RX ORDER — RIZATRIPTAN BENZOATE 5 MG/1
5 TABLET ORAL ONCE AS NEEDED
Qty: 27 TABLET | Refills: 3 | Status: SHIPPED | OUTPATIENT
Start: 2019-04-05 | End: 2020-04-04

## 2019-04-05 NOTE — PROGRESS NOTES
Subjective:     Patient ID: Altagracia Tiwari is a 44 y.o. female.    History of Present Illness  The patient's tremor is stable except the head tremor has increased.  Medicines were reviewed.  She is recently been to a seminar and talked about deep brain stimulation.  No side effects from medication.    The following portions of the patient's history were reviewed and updated as appropriate: allergies, current medications, past family history, past medical history, past social history, past surgical history and problem list.      Current Outpatient Medications:   •  acetaminophen (TYLENOL) 500 MG tablet, Take 500 mg by mouth Every 6 (Six) Hours As Needed for Mild Pain ., Disp: , Rfl:   •  ADVAIR DISKUS 250-50 MCG/DOSE DISKUS, INHALE 1 PUFF BY MOUTH TWICE DAILY, Disp: 60 each, Rfl: 0  •  albuterol (ACCUNEB) 0.63 MG/3ML nebulizer solution, Take 3 mL by nebulization Every 6 (Six) Hours As Needed for wheezing., Disp: 3 mL, Rfl: 12  •  ALPRAZolam (XANAX) 0.25 MG tablet, Take 1 tablet by mouth 2 (Two) Times a Day., Disp: 180 tablet, Rfl: 1  •  Cholecalciferol (VITAMIN D3) 5000 UNITS tablet, Take by mouth., Disp: , Rfl:   •  esomeprazole (nexIUM) 40 MG capsule, Take 40 mg by mouth Every Morning Before Breakfast., Disp: , Rfl:   •  Etanercept (ENBREL SURECLICK) 50 MG/ML solution auto-injector, Inject  under the skin., Disp: , Rfl:   •  lamoTRIgine (LaMICtal) 100 MG tablet, TK 1 T PO QD AT DINNER TIME, Disp: , Rfl: 3  •  levothyroxine (SYNTHROID, LEVOTHROID) 175 MCG tablet, TAKE 1 TABLET BY MOUTH EVERY DAY( NEED TO BE SEEN FOR FURTHER REFILLS), Disp: 15 tablet, Rfl: 0  •  naproxen (NAPROSYN) 500 MG tablet, Take 1 tablet by mouth 2 (two) times a day as needed for mild pain (1-3)., Disp: 28 tablet, Rfl: 0  •  NASACORT ALLERGY 24HR 55 MCG/ACT nasal inhaler, INSTILL 2 SPRAYS IEN ONCE DAILY, Disp: , Rfl: 0  •  promethazine (PHENERGAN) 25 MG tablet, Take 1 tablet by mouth Every 6 (Six) Hours As Needed for Nausea or Vomiting., Disp:  30 tablet, Rfl: 2  •  rizatriptan (MAXALT) 5 MG tablet, Take 1 tablet by mouth 1 (One) Time As Needed for Migraine. May repeat in 2 hours if needed, Disp: 27 tablet, Rfl: 3  •  topiramate (TOPAMAX) 25 MG tablet, Take 3 tablets by mouth Every Night., Disp: 270 tablet, Rfl: 3  •  traZODone (DESYREL) 100 MG tablet, TK 1 T PO QHS, Disp: , Rfl: 0  •  venlafaxine XR (EFFEXOR-XR) 150 MG 24 hr capsule, , Disp: , Rfl: 1  •  VENTOLIN  (90 BASE) MCG/ACT inhaler, INHALE 1 TO 2 PUFFS BY MOUTH EVERY 6 HOURS AS NEEDED, Disp: 18 g, Rfl: 0    Review of Systems   Constitutional: Negative.    Neurological: Positive for tremors and headaches. Negative for dizziness, seizures, syncope, facial asymmetry, speech difficulty, weakness, light-headedness and numbness.   Psychiatric/Behavioral: Negative.           I have reviewed ROS completed by medical assistant.     Objective:    Neurologic Exam  Mental status examination was appropriate.  Funduscopy, visual fields, eye movements and pupillary reflexes were normal.  No facial weakness was noted.  Gait was normal.  No pattern of focal weakness was noted.  High frequency low amplitude tremor increasing in her head.  Physical Exam    Assessment/Plan:     Altagracia was seen today for tremors and migraine.    Diagnoses and all orders for this visit:    Tremor    Other orders  -     rizatriptan (MAXALT) 5 MG tablet; Take 1 tablet by mouth 1 (One) Time As Needed for Migraine. May repeat in 2 hours if needed  -     promethazine (PHENERGAN) 25 MG tablet; Take 1 tablet by mouth Every 6 (Six) Hours As Needed for Nausea or Vomiting.  -     ALPRAZolam (XANAX) 0.25 MG tablet; Take 1 tablet by mouth 2 (Two) Times a Day.  -     topiramate (TOPAMAX) 25 MG tablet; Take 3 tablets by mouth Every Night.         Just the possibility of concentrated ultrasound.  Available at [facility].  Prescription drug management - meds as above    Follow-up in the office in one year. Thank you for allowing me to share in  the care of this patient.  Chema Edge M.D.

## 2019-04-29 ENCOUNTER — HOSPITAL ENCOUNTER (OUTPATIENT)
Dept: LAB | Facility: HOSPITAL | Age: 44
Discharge: HOME | End: 2019-04-29
Attending: NURSE PRACTITIONER | Admitting: NURSE PRACTITIONER

## 2019-04-29 LAB
ALBUMIN SERPL-MCNC: 4 G/DL (ref 3.5–4.8)
ALBUMIN/GLOB SERPL: 1.1 {RATIO} (ref 1–1.7)
ALP SERPL-CCNC: 72 IU/L (ref 32–91)
ALT SERPL-CCNC: 21 IU/L (ref 14–54)
ANION GAP SERPL CALC-SCNC: 14.1 MMOL/L (ref 10–20)
AST SERPL-CCNC: 17 IU/L (ref 15–41)
BASOPHILS # BLD AUTO: 0.1 10*3/UL (ref 0–0.2)
BASOPHILS NFR BLD AUTO: 1 % (ref 0–2)
BILIRUB SERPL-MCNC: 0.3 MG/DL (ref 0.3–1.2)
BUN SERPL-MCNC: 31 MG/DL (ref 8–20)
BUN/CREAT SERPL: 25.8 (ref 5.4–26.2)
CALCIUM SERPL-MCNC: 9.2 MG/DL (ref 8.9–10.3)
CHLORIDE SERPL-SCNC: 103 MMOL/L (ref 101–111)
CONV CO2: 22 MMOL/L (ref 22–32)
CONV TOTAL PROTEIN: 7.6 G/DL (ref 6.1–7.9)
CREAT UR-MCNC: 1.2 MG/DL (ref 0.4–1)
CRP SERPL-MCNC: 0.98 MG/DL (ref 0–0.7)
DIFFERENTIAL METHOD BLD: (no result)
EOSINOPHIL # BLD AUTO: 0.3 10*3/UL (ref 0–0.3)
EOSINOPHIL # BLD AUTO: 4 % (ref 0–3)
ERYTHROCYTE [DISTWIDTH] IN BLOOD BY AUTOMATED COUNT: 15 % (ref 11.5–14.5)
ERYTHROCYTE [SEDIMENTATION RATE] IN BLOOD BY WESTERGREN METHOD: 38 MM/HR (ref 0–20)
GLOBULIN UR ELPH-MCNC: 3.6 G/DL (ref 2.5–3.8)
GLUCOSE SERPL-MCNC: 95 MG/DL (ref 65–99)
HCT VFR BLD AUTO: 35.6 % (ref 35–49)
HGB BLD-MCNC: 11.6 G/DL (ref 12–15)
LYMPHOCYTES # BLD AUTO: 2.5 10*3/UL (ref 0.8–4.8)
LYMPHOCYTES NFR BLD AUTO: 40 % (ref 18–42)
MCH RBC QN AUTO: 26.6 PG (ref 26–32)
MCHC RBC AUTO-ENTMCNC: 32.6 G/DL (ref 32–36)
MCV RBC AUTO: 81.7 FL (ref 80–94)
MONOCYTES # BLD AUTO: 0.8 10*3/UL (ref 0.1–1.3)
MONOCYTES NFR BLD AUTO: 13 % (ref 2–11)
NEUTROPHILS # BLD AUTO: 2.7 10*3/UL (ref 2.3–8.6)
NEUTROPHILS NFR BLD AUTO: 42 % (ref 50–75)
NRBC BLD AUTO-RTO: 0 /100{WBCS}
NRBC/RBC NFR BLD MANUAL: 0 10*3/UL
PLATELET # BLD AUTO: 267 10*3/UL (ref 150–450)
PMV BLD AUTO: 8.3 FL (ref 7.4–10.4)
POTASSIUM SERPL-SCNC: 4.1 MMOL/L (ref 3.6–5.1)
RBC # BLD AUTO: 4.36 10*6/UL (ref 4–5.4)
SODIUM SERPL-SCNC: 135 MMOL/L (ref 136–144)
WBC # BLD AUTO: 6.3 10*3/UL (ref 4.5–11.5)

## 2019-12-05 RX ORDER — ALPRAZOLAM 0.25 MG/1
0.25 TABLET ORAL 2 TIMES DAILY
Qty: 180 TABLET | Refills: 1 | Status: SHIPPED | OUTPATIENT
Start: 2019-12-05 | End: 2020-03-18 | Stop reason: SDUPTHER

## 2020-03-18 DIAGNOSIS — R25.1 TREMOR: Primary | ICD-10-CM

## 2020-03-18 RX ORDER — ALPRAZOLAM 0.25 MG/1
0.25 TABLET ORAL 2 TIMES DAILY
Qty: 180 TABLET | Refills: 0 | Status: SHIPPED | OUTPATIENT
Start: 2020-03-18 | End: 2020-05-19 | Stop reason: SDUPTHER

## 2020-03-18 NOTE — TELEPHONE ENCOUNTER
PT STS THAT SHE NEEDS HER REFILL ON HER  XANAX ...PLEASE CALL HER IF THERE IS A PROBLEM ON THIS...

## 2020-04-16 RX ORDER — TOPIRAMATE 25 MG/1
75 TABLET ORAL NIGHTLY
Qty: 270 TABLET | Refills: 3 | Status: SHIPPED | OUTPATIENT
Start: 2020-04-16 | End: 2020-05-19 | Stop reason: SDUPTHER

## 2020-05-19 ENCOUNTER — TELEMEDICINE (OUTPATIENT)
Dept: NEUROLOGY | Facility: CLINIC | Age: 45
End: 2020-05-19

## 2020-05-19 DIAGNOSIS — G43.009 MIGRAINE WITHOUT AURA AND WITHOUT STATUS MIGRAINOSUS, NOT INTRACTABLE: Primary | ICD-10-CM

## 2020-05-19 DIAGNOSIS — R25.1 TREMOR: ICD-10-CM

## 2020-05-19 PROCEDURE — 99213 OFFICE O/P EST LOW 20 MIN: CPT | Performed by: PSYCHIATRY & NEUROLOGY

## 2020-05-19 RX ORDER — TOPIRAMATE 25 MG/1
75 TABLET ORAL NIGHTLY
Qty: 270 TABLET | Refills: 3 | Status: SHIPPED | OUTPATIENT
Start: 2020-05-19 | End: 2020-06-19 | Stop reason: SDUPTHER

## 2020-05-19 RX ORDER — PROMETHAZINE HYDROCHLORIDE 25 MG/1
25 TABLET ORAL EVERY 6 HOURS PRN
Qty: 30 TABLET | Refills: 2 | Status: SHIPPED | OUTPATIENT
Start: 2020-05-19 | End: 2023-02-11

## 2020-05-19 RX ORDER — ALPRAZOLAM 0.25 MG/1
0.25 TABLET ORAL 2 TIMES DAILY
Qty: 180 TABLET | Refills: 1 | Status: SHIPPED | OUTPATIENT
Start: 2020-05-19

## 2020-05-19 RX ORDER — RIZATRIPTAN BENZOATE 5 MG/1
5 TABLET ORAL ONCE AS NEEDED
Qty: 30 TABLET | Refills: 11 | Status: SHIPPED | OUTPATIENT
Start: 2020-05-19 | End: 2021-05-19

## 2020-05-19 NOTE — PROGRESS NOTES
I performed this clinical encounter by utilizing a real-time telehealth video connection between my location and the patient's location at home.  I obtained verbal consent from the patient to perform this clinical encounter utilizing video and prepared the patient by answering any questions they had about the telehealth interaction.    Subjective:       Patient ID: Altagracia Tiwari is a 45 y.o. female presents for No chief complaint on file.         History of Present Illness  Video visit.  Reevaluated for migraine and essential tremor.  Symptoms are fairly stable.  Her tremor is fairly severe still.  At this point she has contacted you for the possibility of focused ultrasound.  Plans to wait till the pandemic is over.  Otherwise she is tolerating her medicines well and does not wish to change.  She may be moving from the [facility].    The following portions of the patient's history were reviewed and updated as appropriate: allergies, current medications, past family history, past medical history, past social history, past surgical history and problem list.      Current Outpatient Medications on File Prior to Visit   Medication Sig Dispense Refill   • acetaminophen (TYLENOL) 500 MG tablet Take 500 mg by mouth Every 6 (Six) Hours As Needed for Mild Pain .     • ADVAIR DISKUS 250-50 MCG/DOSE DISKUS INHALE 1 PUFF BY MOUTH TWICE DAILY 60 each 0   • albuterol (ACCUNEB) 0.63 MG/3ML nebulizer solution Take 3 mL by nebulization Every 6 (Six) Hours As Needed for wheezing. 3 mL 12   • Cholecalciferol (VITAMIN D3) 5000 UNITS tablet Take by mouth.     • esomeprazole (nexIUM) 40 MG capsule Take 40 mg by mouth Every Morning Before Breakfast.     • Etanercept (ENBREL SURECLICK) 50 MG/ML solution auto-injector Inject  under the skin.     • lamoTRIgine (LaMICtal) 100 MG tablet TK 1 T PO QD AT DINNER TIME  3   • levothyroxine (SYNTHROID, LEVOTHROID) 175 MCG tablet TAKE 1 TABLET BY MOUTH EVERY DAY( NEED TO BE SEEN FOR FURTHER  REFILLS) 15 tablet 0   • naproxen (NAPROSYN) 500 MG tablet Take 1 tablet by mouth 2 (two) times a day as needed for mild pain (1-3). 28 tablet 0   • NASACORT ALLERGY 24HR 55 MCG/ACT nasal inhaler INSTILL 2 SPRAYS IEN ONCE DAILY  0   • traZODone (DESYREL) 100 MG tablet TK 1 T PO QHS  0   • venlafaxine XR (EFFEXOR-XR) 150 MG 24 hr capsule   1   • VENTOLIN  (90 BASE) MCG/ACT inhaler INHALE 1 TO 2 PUFFS BY MOUTH EVERY 6 HOURS AS NEEDED 18 g 0   • [DISCONTINUED] ALPRAZolam (XANAX) 0.25 MG tablet Take 1 tablet by mouth 2 (Two) Times a Day. 180 tablet 0   • [DISCONTINUED] promethazine (PHENERGAN) 25 MG tablet Take 1 tablet by mouth Every 6 (Six) Hours As Needed for Nausea or Vomiting. 30 tablet 2   • [DISCONTINUED] topiramate (TOPAMAX) 25 MG tablet Take 3 tablets by mouth Every Night. 270 tablet 3     No current facility-administered medications on file prior to visit.            Review of Systems       I have reviewed ROS completed by medical assistant.     Objective:    Neurologic Exam    Physical Exam    Assessment/Plan:  Diagnoses and all orders for this visit:    Migraine without aura and without status migrainosus, not intractable    Tremor  -     ALPRAZolam (XANAX) 0.25 MG tablet; Take 1 tablet by mouth 2 (Two) Times a Day.    Other orders  -     topiramate (TOPAMAX) 25 MG tablet; Take 3 tablets by mouth Every Night.  -     promethazine (PHENERGAN) 25 MG tablet; Take 1 tablet by mouth Every 6 (Six) Hours As Needed for Nausea or Vomiting.  -     rizatriptan (Maxalt) 5 MG tablet; Take 1 tablet by mouth 1 (One) Time As Needed for Migraine. May repeat in 2 hours if needed      Prescription drug management - meds as above    Follow-up in the office in one year if still in the area.  Otherwise we will look at the possibility of seeing neurologist in [place].. Thank you for allowing me to share in the care of this patient.  Chema Edge M.D.     Total time of discussion was  20 minutes.

## 2020-06-19 RX ORDER — TOPIRAMATE 25 MG/1
75 TABLET ORAL NIGHTLY
Qty: 90 TABLET | Refills: 11 | Status: ON HOLD | OUTPATIENT
Start: 2020-06-19 | End: 2022-09-23

## 2020-06-29 ENCOUNTER — TELEPHONE (OUTPATIENT)
Dept: OTOLARYNGOLOGY | Age: 45
End: 2020-06-29

## 2020-07-02 ENCOUNTER — OFFICE VISIT (OUTPATIENT)
Dept: OTOLARYNGOLOGY | Age: 45
End: 2020-07-02
Payer: COMMERCIAL

## 2020-07-02 ENCOUNTER — HOSPITAL ENCOUNTER (OUTPATIENT)
Dept: GENERAL RADIOLOGY | Age: 45
Discharge: HOME | End: 2020-07-02
Payer: COMMERCIAL

## 2020-07-02 VITALS — BODY MASS INDEX: 36.22 KG/M2 | WEIGHT: 239 LBS | HEIGHT: 68 IN

## 2020-07-02 PROBLEM — J38.3 SPASMODIC DYSPHONIA: Status: ACTIVE | Noted: 2020-07-02

## 2020-07-02 PROCEDURE — 31575 DIAGNOSTIC LARYNGOSCOPY: CPT | Performed by: OTOLARYNGOLOGY

## 2020-07-02 PROCEDURE — 99242 OFF/OP CONSLTJ NEW/EST SF 20: CPT | Performed by: OTOLARYNGOLOGY

## 2020-07-02 PROCEDURE — 71046 X-RAY EXAM CHEST 2 VIEWS: CPT

## 2020-07-02 RX ORDER — ADALIMUMAB 40MG/0.4ML
KIT SUBCUTANEOUS
COMMUNITY
Start: 2019-07-16

## 2020-07-02 RX ORDER — NYSTATIN 100000 [USP'U]/G
POWDER TOPICAL
COMMUNITY
Start: 2019-04-18

## 2020-07-02 RX ORDER — ESOMEPRAZOLE MAGNESIUM 40 MG/1
CAPSULE, DELAYED RELEASE ORAL
COMMUNITY
Start: 2020-06-08

## 2020-07-02 RX ORDER — ALBUTEROL SULFATE 90 UG/1
2 AEROSOL, METERED RESPIRATORY (INHALATION) EVERY 6 HOURS PRN
COMMUNITY
Start: 2016-10-13 | End: 2022-08-08

## 2020-07-02 RX ORDER — ALPRAZOLAM 0.25 MG/1
0.25 TABLET ORAL 2 TIMES DAILY
COMMUNITY
Start: 2015-01-17

## 2020-07-02 RX ORDER — LEVOTHYROXINE SODIUM 175 UG/1
150 TABLET ORAL DAILY
COMMUNITY
Start: 2017-10-26

## 2020-07-02 RX ORDER — TOPIRAMATE 25 MG/1
75 TABLET ORAL NIGHTLY
COMMUNITY
Start: 2020-06-19

## 2020-07-02 RX ORDER — TRIAMCINOLONE ACETONIDE 55 UG/1
SPRAY, METERED NASAL
COMMUNITY
Start: 2016-02-03

## 2020-07-02 RX ORDER — ESOMEPRAZOLE MAGNESIUM 40 MG/1
40 CAPSULE, DELAYED RELEASE ORAL
COMMUNITY
End: 2020-07-02

## 2020-07-02 RX ORDER — TRAZODONE HYDROCHLORIDE 100 MG/1
TABLET ORAL
COMMUNITY
Start: 2018-03-20

## 2020-07-02 RX ORDER — LAMOTRIGINE 100 MG/1
TABLET ORAL
COMMUNITY
Start: 2017-03-14

## 2020-07-02 RX ORDER — ACETAMINOPHEN 500 MG
500 TABLET ORAL
COMMUNITY

## 2020-07-02 RX ORDER — PROMETHAZINE HYDROCHLORIDE 25 MG/1
25 TABLET ORAL EVERY 6 HOURS PRN
COMMUNITY
Start: 2019-04-05

## 2020-07-02 RX ORDER — MONTELUKAST SODIUM 10 MG/1
TABLET ORAL
COMMUNITY
Start: 2020-06-13

## 2020-07-02 RX ORDER — RIZATRIPTAN BENZOATE 5 MG/1
5 TABLET ORAL
COMMUNITY
Start: 2019-04-05 | End: 2021-10-20

## 2020-07-02 RX ORDER — VENLAFAXINE HYDROCHLORIDE 150 MG/1
1 CAPSULE, EXTENDED RELEASE ORAL DAILY
COMMUNITY
Start: 2015-10-30

## 2020-07-02 NOTE — ASSESSMENT & PLAN NOTE
Patient has history of tremor and in fact has some type of procedure planned for this in the coming months. She clearly has a tremulous quality to her voice. I did not hear her voice breaks or guttural sounds but mainly a tremulous quality. We will set up with speech therapy.   Possibly a candidate for Botox depending on response to therapy
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
## 2020-07-15 ENCOUNTER — TELEPHONE (OUTPATIENT)
Dept: OTOLARYNGOLOGY | Age: 45
End: 2020-07-15

## 2020-07-15 NOTE — TELEPHONE ENCOUNTER
----- Message from Tahira Sequeira MD sent at 7/13/2020  8:13 PM CDT -----  Chest x-ray clear. Nothing related to cough. .  Send copy to referring doctor. Patient voiced agreement to results per Dr. Mary Zambrano. Patient stated she has not been contact from speech therapy. I called and they unfortunately did not answer I left a voicemail to call the patient to schedule. Patient is aware.

## 2020-07-24 ENCOUNTER — OFFICE VISIT (OUTPATIENT)
Age: 45
End: 2020-07-24

## 2020-07-24 VITALS — TEMPERATURE: 98 F | HEART RATE: 102 BPM | OXYGEN SATURATION: 95 %

## 2020-07-28 LAB
REPORT: NORMAL
SARS-COV-2: NOT DETECTED
THIS TEST SENT TO: NORMAL

## 2020-09-05 ENCOUNTER — NURSE TRIAGE (OUTPATIENT)
Dept: OTHER | Facility: CLINIC | Age: 45
End: 2020-09-05

## 2020-09-05 NOTE — TELEPHONE ENCOUNTER
Reason for Disposition   Can't stand (bear weight) or walk    Answer Assessment - Initial Assessment Questions  1. MECHANISM: \"How did the injury happen? \" (e.g., twisting injury, direct blow)       Caller stated that she was on a 4 step- step ladder when she was coming down she skipped the bottom step and stepped down when she felt pain in her right foot and was unable to bear weight   2. ONSET: \"When did the injury happen? \" (Minutes or hours ago)       Between 4 and 5 pm   3. LOCATION: \"Where is the injury located? \"     Right foot   4. APPEARANCE of INJURY: \"What does the injury look like? \"      Looks normal   5. WEIGHT-BEARING: \"Can you put weight on that foot? \" \"Can you walk (four steps or more)? \"       Unable to bear weight, has to walk on the side of her foot   6. SIZE: For cuts, bruises, or swelling, ask: \"How large is it? \" (e.g., inches or centimeters;  entire joint)      None   7. PAIN: \"Is there pain? \" If so, ask: \"How bad is the pain? \"    (e.g., Scale 1-10; or mild, moderate, severe)     4/5 when sitting, when standing 11/12  8. TETANUS: For any breaks in the skin, ask: \"When was the last tetanus booster? \"     None   9. OTHER SYMPTOMS: \"Do you have any other symptoms? \"      None       Caller stated that she was on a 4 step- step ladder when she was coming down she skipped the bottom step and stepped down when she felt pain in her right foot and was unable to bear weight two hours ago . Caller stated that she is able to walk, but has to walk on the side of her foot. Patient was informed to be evaluated at the ED and to call back should her symptoms worsen or for additional questions and agreed.     Protocols used: ANKLE AND FOOT INJURY-ADULT-

## 2020-09-06 ENCOUNTER — APPOINTMENT (OUTPATIENT)
Dept: GENERAL RADIOLOGY | Age: 45
End: 2020-09-06
Payer: COMMERCIAL

## 2020-09-06 ENCOUNTER — HOSPITAL ENCOUNTER (EMERGENCY)
Age: 45
Discharge: HOME | End: 2020-09-06
Attending: EMERGENCY MEDICINE
Payer: COMMERCIAL

## 2020-09-06 VITALS
BODY MASS INDEX: 34.86 KG/M2 | WEIGHT: 230 LBS | OXYGEN SATURATION: 99 % | HEART RATE: 87 BPM | SYSTOLIC BLOOD PRESSURE: 126 MMHG | RESPIRATION RATE: 20 BRPM | HEIGHT: 68 IN | TEMPERATURE: 97.7 F | DIASTOLIC BLOOD PRESSURE: 76 MMHG

## 2020-09-06 PROCEDURE — 99284 EMERGENCY DEPT VISIT MOD MDM: CPT

## 2020-09-06 PROCEDURE — 99283 EMERGENCY DEPT VISIT LOW MDM: CPT

## 2020-09-06 PROCEDURE — 73630 X-RAY EXAM OF FOOT: CPT

## 2020-09-06 RX ORDER — NAPROXEN 500 MG/1
500 TABLET ORAL 2 TIMES DAILY WITH MEALS
Qty: 60 TABLET | Refills: 0 | Status: SHIPPED | OUTPATIENT
Start: 2020-09-06

## 2020-09-06 ASSESSMENT — PAIN SCALES - GENERAL: PAINLEVEL_OUTOF10: 3

## 2020-09-06 NOTE — ED NOTES
Patient is alert and oriented x3  Respirations not labored  No apparent distress noted  Skin is WNL  Pt monitored in place  Pt co R foot pain after stepping off of step stool.       Tammy No RN  09/06/20 [id number]

## 2020-09-07 ASSESSMENT — ENCOUNTER SYMPTOMS
SHORTNESS OF BREATH: 0
COUGH: 0

## 2020-09-30 ENCOUNTER — TELEPHONE (OUTPATIENT)
Dept: NEUROLOGY | Facility: CLINIC | Age: 45
End: 2020-09-30

## 2020-09-30 DIAGNOSIS — R25.1 TREMOR: Primary | ICD-10-CM

## 2020-09-30 NOTE — TELEPHONE ENCOUNTER
FORMER PT OF , INSTEAD OF CONTINUING CARE WITH A NEUROLOGIST WITHIN [facility] SHE WOULD LIKE A REFERRAL TO       [facility] (LEI KOEHLER MD)    PHONE: [phone]  FAX:       [phone]    ANY QUESTIONS PLEASE CONTACT PT AND CONTACT WHEN COMPLETED       Altagracia Tiwari (Self) [phone] (H)

## 2021-03-22 ENCOUNTER — HOSPITAL ENCOUNTER (OUTPATIENT)
Dept: GENERAL RADIOLOGY | Age: 46
Discharge: HOME | End: 2021-03-22
Payer: COMMERCIAL

## 2021-03-22 DIAGNOSIS — Z12.31 ENCOUNTER FOR SCREENING MAMMOGRAM FOR MALIGNANT NEOPLASM OF BREAST: ICD-10-CM

## 2021-03-22 DIAGNOSIS — M25.511 RIGHT SHOULDER PAIN, UNSPECIFIED CHRONICITY: ICD-10-CM

## 2021-03-22 LAB
ALBUMIN SERPL-MCNC: 4.3 G/DL (ref 3.5–5.2)
ALP BLD-CCNC: 97 U/L (ref 35–104)
ALT SERPL-CCNC: 27 U/L (ref 5–33)
ANION GAP SERPL CALCULATED.3IONS-SCNC: 13 MMOL/L (ref 7–19)
AST SERPL-CCNC: 19 U/L (ref 5–32)
BASOPHILS ABSOLUTE: 0 K/UL (ref 0–0.2)
BASOPHILS RELATIVE PERCENT: 0.5 % (ref 0–1)
BILIRUB SERPL-MCNC: 0.3 MG/DL (ref 0.2–1.2)
BUN BLDV-MCNC: 14 MG/DL (ref 6–20)
C-REACTIVE PROTEIN: 0.55 MG/DL (ref 0–0.5)
CALCIUM SERPL-MCNC: 10 MG/DL (ref 8.6–10)
CHLORIDE BLD-SCNC: 102 MMOL/L (ref 98–111)
CO2: 23 MMOL/L (ref 22–29)
CREAT SERPL-MCNC: 1.1 MG/DL (ref 0.5–0.9)
EOSINOPHILS ABSOLUTE: 0.1 K/UL (ref 0–0.6)
EOSINOPHILS RELATIVE PERCENT: 1.5 % (ref 0–5)
GFR AFRICAN AMERICAN: >59
GFR NON-AFRICAN AMERICAN: 53
GLUCOSE BLD-MCNC: 99 MG/DL (ref 74–109)
HCT VFR BLD CALC: 38.5 % (ref 37–47)
HEMOGLOBIN: 12.1 G/DL (ref 12–16)
IMMATURE GRANULOCYTES #: 0 K/UL
LYMPHOCYTES ABSOLUTE: 2 K/UL (ref 1.1–4.5)
LYMPHOCYTES RELATIVE PERCENT: 32.7 % (ref 20–40)
MCH RBC QN AUTO: 24.6 PG (ref 27–31)
MCHC RBC AUTO-ENTMCNC: 31.4 G/DL (ref 33–37)
MCV RBC AUTO: 78.3 FL (ref 81–99)
MONOCYTES ABSOLUTE: 0.4 K/UL (ref 0–0.9)
MONOCYTES RELATIVE PERCENT: 7 % (ref 0–10)
NEUTROPHILS ABSOLUTE: 3.5 K/UL (ref 1.5–7.5)
NEUTROPHILS RELATIVE PERCENT: 58 % (ref 50–65)
PDW BLD-RTO: 17.7 % (ref 11.5–14.5)
PLATELET # BLD: 359 K/UL (ref 130–400)
PMV BLD AUTO: 10.6 FL (ref 9.4–12.3)
POTASSIUM SERPL-SCNC: 4.5 MMOL/L (ref 3.5–5)
RBC # BLD: 4.92 M/UL (ref 4.2–5.4)
SEDIMENTATION RATE, ERYTHROCYTE: 28 MM/HR (ref 0–20)
SODIUM BLD-SCNC: 138 MMOL/L (ref 136–145)
TOTAL PROTEIN: 8.4 G/DL (ref 6.6–8.7)
WBC # BLD: 6 K/UL (ref 4.8–10.8)

## 2021-03-22 PROCEDURE — 73030 X-RAY EXAM OF SHOULDER: CPT

## 2021-03-25 LAB
QUANTI TB GOLD PLUS: NEGATIVE
QUANTI TB1 MINUS NIL: 0 IU/ML (ref 0–0.34)
QUANTI TB2 MINUS NIL: 0 IU/ML (ref 0–0.34)
QUANTIFERON MITOGEN: >10 IU/ML
QUANTIFERON NIL: 0.02 IU/ML

## 2021-06-01 ENCOUNTER — HOSPITAL ENCOUNTER (OUTPATIENT)
Dept: WOMENS IMAGING | Age: 46
Discharge: HOME | End: 2021-06-01
Payer: COMMERCIAL

## 2021-06-01 PROCEDURE — 77063 BREAST TOMOSYNTHESIS BI: CPT

## 2021-06-12 ENCOUNTER — OFFICE VISIT (OUTPATIENT)
Dept: URGENT CARE | Age: 46
End: 2021-06-12
Payer: COMMERCIAL

## 2021-06-12 VITALS
BODY MASS INDEX: 36.49 KG/M2 | RESPIRATION RATE: 20 BRPM | HEART RATE: 103 BPM | WEIGHT: 240 LBS | SYSTOLIC BLOOD PRESSURE: 118 MMHG | DIASTOLIC BLOOD PRESSURE: 72 MMHG | OXYGEN SATURATION: 96 % | TEMPERATURE: 97.7 F

## 2021-06-12 DIAGNOSIS — J45.909 ASTHMA, UNSPECIFIED ASTHMA SEVERITY, UNSPECIFIED WHETHER COMPLICATED, UNSPECIFIED WHETHER PERSISTENT: Primary | ICD-10-CM

## 2021-06-12 PROCEDURE — 99203 OFFICE O/P NEW LOW 30 MIN: CPT | Performed by: NURSE PRACTITIONER

## 2021-06-12 RX ORDER — CETIRIZINE HYDROCHLORIDE 10 MG/1
10 TABLET ORAL DAILY
COMMUNITY

## 2021-06-12 ASSESSMENT — ENCOUNTER SYMPTOMS
COUGH: 1
SHORTNESS OF BREATH: 1
CHEST TIGHTNESS: 1

## 2021-06-12 NOTE — PATIENT INSTRUCTIONS
Patient Education        Asthma Attack: Care Instructions  Overview     During an asthma attack, the airways swell and narrow. This makes it hard to breathe. Severe asthma attacks can be dangerous. But you can help prevent these attacks by keeping your asthma under control and treating symptoms before they get bad. Symptoms include being short of breath, having chest tightness, coughing, and wheezing. Noting and treating these symptoms can also help you avoid trips to the emergency room. If you notice any problems or new symptoms, get medical treatment right away. Follow-up care is a key part of your treatment and safety. Be sure to make and go to all appointments, and call your doctor if you are having problems. It's also a good idea to know your test results and keep a list of the medicines you take. How can you care for yourself at home? · Follow your asthma action plan to prevent and treat attacks. If you don't have an asthma action plan, work with your doctor to create one. · Take your asthma medicines exactly as prescribed. Talk to your doctor right away if you have any questions about how to take them. ? Use your quick-relief medicine when you have symptoms of an attack. Quick-relief medicine is usually an albuterol inhaler. Some people need to use quick-relief medicine before they exercise. ? Take your controller medicine every day, not just when you have symptoms. Controller medicine is usually an inhaled corticosteroid. The goal is to prevent problems before they occur. ? If your doctor prescribed corticosteroid pills to use during an attack, take them exactly as prescribed. It may take hours for the pills to work, but they may make the episode shorter and help you breathe better. ? Keep your quick-relief medicine with you at all times. · Talk to your doctor before using other medicines. Some medicines, such as aspirin, can cause asthma attacks in some people.   · If you have a peak flow meter, use it to check how well you are breathing. This can help you predict when an asthma attack is going to occur. Then you can take medicine to prevent the asthma attack or make it less severe. · Do not smoke or allow others to smoke around you. Avoid smoky places. Smoking makes asthma worse. If you need help quitting, talk to your doctor about stop-smoking programs and medicines. These can increase your chances of quitting for good. · Learn what triggers an asthma attack for you, and avoid the triggers when you can. Common triggers include colds, smoke, air pollution, dust, pollen, mold, pets, cockroaches, stress, and cold air. · Avoid colds and the flu. Talk to your doctor about getting a pneumococcal vaccine shot. If you have had one before, ask your doctor if you need a second dose. Get a flu vaccine every fall. If you must be around people with colds or the flu, wash your hands often. When should you call for help? Call 911 anytime you think you may need emergency care. For example, call if:    · You have severe trouble breathing. Call your doctor now or seek immediate medical care if:    · Your symptoms do not get better after you have followed your asthma action plan.     · You have new or worse trouble breathing.     · Your coughing and wheezing get worse.     · You cough up dark brown or bloody mucus (sputum).     · You have a new or higher fever. Watch closely for changes in your health, and be sure to contact your doctor if:    · You need to use quick-relief medicine on more than 2 days a week within a month (unless it is just for exercise).     · You cough more deeply or more often, especially if you notice more mucus or a change in the color of your mucus.     · You are not getting better as expected. Where can you learn more? Go to [email or web address].[email or web address]. org and sign in to your [employer] account.  Enter S291 in the Thinglink box to learn more about \"Asthma Attack: Care Instructions. \"     If you do not have an account, please click on the \"Sign Up Now\" link. Current as of: October 26, 2020               Content Version: 12.8  © 3711-8998 [employer]. Care instructions adapted under license by [facility] ([facility]). If you have questions about a medical condition or this instruction, always ask your healthcare professional. [address] any warranty or liability for your use of this information.
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
## 2021-08-17 LAB
ALBUMIN SERPL-MCNC: 4.4 G/DL (ref 3.5–5.2)
ALP BLD-CCNC: 108 U/L (ref 35–104)
ALT SERPL-CCNC: 21 U/L (ref 5–33)
ANION GAP SERPL CALCULATED.3IONS-SCNC: 12 MMOL/L (ref 7–19)
AST SERPL-CCNC: 17 U/L (ref 5–32)
BASOPHILS ABSOLUTE: 0 K/UL (ref 0–0.2)
BASOPHILS RELATIVE PERCENT: 0.7 % (ref 0–1)
BILIRUB SERPL-MCNC: 0.5 MG/DL (ref 0.2–1.2)
BUN BLDV-MCNC: 17 MG/DL (ref 6–20)
CALCIUM SERPL-MCNC: 9.8 MG/DL (ref 8.6–10)
CHLORIDE BLD-SCNC: 101 MMOL/L (ref 98–111)
CHOLESTEROL, FASTING: 178 MG/DL (ref 160–199)
CO2: 25 MMOL/L (ref 22–29)
CREAT SERPL-MCNC: 1 MG/DL (ref 0.5–0.9)
EOSINOPHILS ABSOLUTE: 0.2 K/UL (ref 0–0.6)
EOSINOPHILS RELATIVE PERCENT: 2.7 % (ref 0–5)
FERRITIN: 15.1 NG/ML (ref 13–150)
FOLATE: 6.6 NG/ML (ref 4.8–37.3)
GFR AFRICAN AMERICAN: >59
GFR NON-AFRICAN AMERICAN: 59
GLUCOSE BLD-MCNC: 82 MG/DL (ref 74–109)
HCT VFR BLD CALC: 38.3 % (ref 37–47)
HDLC SERPL-MCNC: 47 MG/DL (ref 65–121)
HEMOGLOBIN: 11.7 G/DL (ref 12–16)
IMMATURE GRANULOCYTES #: 0 K/UL
IRON: 51 UG/DL (ref 37–145)
LDL CHOLESTEROL CALCULATED: 110 MG/DL
LYMPHOCYTES ABSOLUTE: 1.6 K/UL (ref 1.1–4.5)
LYMPHOCYTES RELATIVE PERCENT: 26.6 % (ref 20–40)
MCH RBC QN AUTO: 24.6 PG (ref 27–31)
MCHC RBC AUTO-ENTMCNC: 30.5 G/DL (ref 33–37)
MCV RBC AUTO: 80.5 FL (ref 81–99)
MONOCYTES ABSOLUTE: 0.4 K/UL (ref 0–0.9)
MONOCYTES RELATIVE PERCENT: 6.5 % (ref 0–10)
NEUTROPHILS ABSOLUTE: 3.7 K/UL (ref 1.5–7.5)
NEUTROPHILS RELATIVE PERCENT: 63.2 % (ref 50–65)
PDW BLD-RTO: 16.6 % (ref 11.5–14.5)
PLATELET # BLD: 356 K/UL (ref 130–400)
PMV BLD AUTO: 10.4 FL (ref 9.4–12.3)
POTASSIUM SERPL-SCNC: 4.4 MMOL/L (ref 3.5–5)
RBC # BLD: 4.76 M/UL (ref 4.2–5.4)
RETICULOCYTE ABSOLUTE COUNT: 0.04 M/UL (ref 0.03–0.12)
RETICULOCYTE COUNT PCT: 0.88 % (ref 0.5–1.5)
SODIUM BLD-SCNC: 138 MMOL/L (ref 136–145)
T4 FREE: 1.34 NG/DL (ref 0.93–1.7)
TOTAL IRON BINDING CAPACITY: 421 UG/DL (ref 250–400)
TOTAL PROTEIN: 8.1 G/DL (ref 6.6–8.7)
TRIGLYCERIDE, FASTING: 106 MG/DL (ref 0–149)
TSH SERPL DL<=0.05 MIU/L-ACNC: 1.77 UIU/ML (ref 0.27–4.2)
URIC ACID, SERUM: 3.4 MG/DL (ref 2.4–5.7)
VITAMIN B-12: 332 PG/ML (ref 211–946)
WBC # BLD: 5.8 K/UL (ref 4.8–10.8)

## 2021-10-13 ENCOUNTER — TELEPHONE (OUTPATIENT)
Dept: NEUROLOGY | Facility: CLINIC | Age: 46
End: 2021-10-13

## 2021-10-13 NOTE — TELEPHONE ENCOUNTER
Caller: [facility] - HOME DELIVERY - [place], [place] - [address], [address] [facility] - [phone]  - [phone] FX    Relationship: Pharmacy      Medication requested (name and dosage): topiramate (TOPAMAX) 25 MG tablet      Pharmacy where request should be sent: [facility]    Additional details provided by patient: N/A    Best call back number: [phone]    Does the patient have less than a 3 day supply:  [] Yes  [x] No    Eneida BRAMBILA Rep   10/13/21 13:46 EDT

## 2021-10-14 NOTE — TELEPHONE ENCOUNTER
Pt was referred to Dr Quezada at [facility]. Pt has seen and is scheduled to f/u with Dr Quezada.

## 2021-10-20 ENCOUNTER — OFFICE VISIT (OUTPATIENT)
Dept: URGENT CARE | Age: 46
End: 2021-10-20
Payer: COMMERCIAL

## 2021-10-20 VITALS
WEIGHT: 243 LBS | BODY MASS INDEX: 36.83 KG/M2 | OXYGEN SATURATION: 98 % | DIASTOLIC BLOOD PRESSURE: 77 MMHG | RESPIRATION RATE: 18 BRPM | HEART RATE: 100 BPM | SYSTOLIC BLOOD PRESSURE: 115 MMHG | TEMPERATURE: 98.2 F | HEIGHT: 68 IN

## 2021-10-20 DIAGNOSIS — J45.21 MILD INTERMITTENT ASTHMA WITH EXACERBATION: Primary | ICD-10-CM

## 2021-10-20 PROCEDURE — 96372 THER/PROPH/DIAG INJ SC/IM: CPT | Performed by: NURSE PRACTITIONER

## 2021-10-20 PROCEDURE — 99213 OFFICE O/P EST LOW 20 MIN: CPT | Performed by: NURSE PRACTITIONER

## 2021-10-20 RX ORDER — DEXAMETHASONE SODIUM PHOSPHATE 10 MG/ML
10 INJECTION INTRAMUSCULAR; INTRAVENOUS ONCE
Status: COMPLETED | OUTPATIENT
Start: 2021-10-20 | End: 2021-10-20

## 2021-10-20 RX ADMIN — DEXAMETHASONE SODIUM PHOSPHATE 10 MG: 10 INJECTION INTRAMUSCULAR; INTRAVENOUS at 11:43

## 2021-10-20 ASSESSMENT — PATIENT HEALTH QUESTIONNAIRE - PHQ9
1. LITTLE INTEREST OR PLEASURE IN DOING THINGS: 0
SUM OF ALL RESPONSES TO PHQ QUESTIONS 1-9: 0
SUM OF ALL RESPONSES TO PHQ QUESTIONS 1-9: 0
SUM OF ALL RESPONSES TO PHQ9 QUESTIONS 1 & 2: 0
SUM OF ALL RESPONSES TO PHQ QUESTIONS 1-9: 0
2. FEELING DOWN, DEPRESSED OR HOPELESS: 0

## 2021-10-20 ASSESSMENT — ENCOUNTER SYMPTOMS
SHORTNESS OF BREATH: 1
WHEEZING: 0
COUGH: 1
ALLERGIC/IMMUNOLOGIC NEGATIVE: 1
EYES NEGATIVE: 1
GASTROINTESTINAL NEGATIVE: 1
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
## 2021-10-20 NOTE — PATIENT INSTRUCTIONS
Patient Education        Asthma Attack: Care Instructions  Overview     During an asthma attack, the airways swell and narrow. This makes it hard to breathe. Severe asthma attacks can be dangerous. But you can help prevent these attacks by keeping your asthma under control and treating symptoms before they get bad. Symptoms include being short of breath, having chest tightness, coughing, and wheezing. Noting and treating these symptoms can also help you avoid trips to the emergency room. If you notice any problems or new symptoms, get medical treatment right away. Follow-up care is a key part of your treatment and safety. Be sure to make and go to all appointments, and call your doctor if you are having problems. It's also a good idea to know your test results and keep a list of the medicines you take. How can you care for yourself at home? · Follow your asthma action plan to prevent and treat attacks. If you don't have an asthma action plan, work with your doctor to create one. · Take your asthma medicines exactly as prescribed. Talk to your doctor right away if you have any questions about how to take them. ? Use your quick-relief medicine when you have symptoms of an asthma attack. Some people need to use quick-relief medicine before they exercise to prevent asthma symptoms. Albuterol is a quick-relief medicine that is often used. In some cases, a certain type of controller inhaler is used as a quick-relief medicine. Ask your doctor what to use for quick relief. ? Take your controller medicine. If you have symptoms often, you will likely need to take it every day. Controller medicine usually includes an inhaled corticosteroid. The goal is to prevent problems before they occur. ? If your doctor prescribed corticosteroid pills to use during an attack, take them exactly as prescribed. It may take hours for the pills to work, but they may make the episode shorter and help you breathe better. ?  Keep your quick-relief medicine with you at all times. · Talk to your doctor before using other medicines. Some medicines, such as aspirin, can cause asthma attacks in some people. · If you have a peak flow meter, use it to check how well you are breathing. This can help you predict when an asthma attack is going to occur. Then you can take medicine to prevent the asthma attack or make it less severe. · Do not smoke or allow others to smoke around you. Avoid smoky places. Smoking makes asthma worse. If you need help quitting, talk to your doctor about stop-smoking programs and medicines. These can increase your chances of quitting for good. · Learn what triggers an asthma attack for you, and avoid the triggers when you can. Common triggers include colds, smoke, air pollution, dust, pollen, mold, pets, cockroaches, stress, and cold air. · Avoid colds and the flu. Talk to your doctor about getting a pneumococcal vaccine shot. If you have had one before, ask your doctor if you need a second dose. Get a flu vaccine every fall. If you must be around people with colds or the flu, wash your hands often. When should you call for help? Call 911 anytime you think you may need emergency care. For example, call if:    · You have severe trouble breathing. Call your doctor now or seek immediate medical care if:    · Your symptoms do not get better after you have followed your asthma action plan.     · You have new or worse trouble breathing.     · Your coughing and wheezing get worse.     · You cough up dark brown or bloody mucus (sputum).     · You have a new or higher fever.    Watch closely for changes in your health, and be sure to contact your doctor if:    · You need to use quick-relief medicine on more than 2 days a week within a month (unless it is just for exercise).     · You cough more deeply or more often, especially if you notice more mucus or a change in the color of your mucus.     · You are not getting better as expected. Where can you learn more? Go to [email or web address] and sign in to your [employer] account. Enter O012 in the [employer] box to learn more about \"Asthma Attack: Care Instructions. \"     If you do not have an account, please click on the \"Sign Up Now\" link. Current as of: July 6, 2021               Content Version: 13.0  © 2006-2021 Healthwise, Incorporated. Care instructions adapted under license by [facility] ([facility]). If you have questions about a medical condition or this instruction, always ask your healthcare professional. [address] any warranty or liability for your use of this information.

## 2021-11-05 ENCOUNTER — DOCUMENTATION (OUTPATIENT)
Dept: ADMINISTRATIVE | Facility: HOSPITAL | Age: 46
End: 2021-11-05

## 2021-11-05 NOTE — PROGRESS NOTES
Patient seen in clinic today.  Ordered flu & COVID testing through [employer] drive[facility].  Patient has not been called with appointment.  Tried to call [employer] to help situation.  Spoke with [facility].  Testing available 7-11tomm.  Also spoke with Vandana at .  I tried to put in testing into Epic as well.  Will call patient & ask her to go to test site.  If issues, [facility] has kindly offered to help.  Thank you to all in trying to assist with this patient's care.

## 2021-11-06 PROCEDURE — 87637 SARSCOV2&INF A&B&RSV AMP PRB: CPT | Performed by: NURSE PRACTITIONER

## 2021-11-08 ENCOUNTER — TRANSCRIBE ORDERS (OUTPATIENT)
Dept: LAB | Facility: HOSPITAL | Age: 46
End: 2021-11-08

## 2021-11-08 DIAGNOSIS — R05.9 COUGH: ICD-10-CM

## 2021-11-08 DIAGNOSIS — R09.81 NASAL CONGESTION: Primary | ICD-10-CM

## 2021-11-08 DIAGNOSIS — R53.83 TIREDNESS: ICD-10-CM

## 2022-02-09 ENCOUNTER — CARE COORDINATION (OUTPATIENT)
Dept: OTHER | Facility: CLINIC | Age: 47
End: 2022-02-09

## 2022-02-09 NOTE — CARE COORDINATION
Called and spoke with patient, she is being discharged today, her sister Vish Gaming tested + for covid so is unable to pick her up and take her from [facility] [address] in [place] to her moms house in [address] [place] [address] said that her sisters friend will be picking her up today and taking her. Will follow up with patient tomorrow.     Birgit MORAN, RN- [facility]  Associate Care Manager  [phone]

## 2022-02-10 ENCOUNTER — CARE COORDINATION (OUTPATIENT)
Dept: OTHER | Facility: CLINIC | Age: 47
End: 2022-02-10

## 2022-02-10 RX ORDER — OXYCODONE HYDROCHLORIDE AND ACETAMINOPHEN 5; 325 MG/1; MG/1
1 TABLET ORAL EVERY 8 HOURS PRN
COMMUNITY

## 2022-02-10 NOTE — CARE COORDINATION
Transitions of Care Initial Call    Was this an external facility discharge? Yes, [id number] Discharge Facility: [facility]    Challenges to be reviewed by the provider   Additional needs identified to be addressed with provider: No  none             Method of communication with provider : none      Advance Care Planning:   Does patient have an Advance Directive: not on file. Was this a readmission? No  Patient stated reason for admission: placement of brain stimulator Stage 1   ( Stage 2 will be done on )  Patients top risk factors for readmission: medical condition-essential tremors with recent surgeries and future planned surgeries for brain stimulator    Care Transition Nurse (CTN) contacted the patient by telephone to perform post hospital discharge assessment. Verified name and  with patient as identifiers. Provided introduction to self, and explanation of the CTN role. CTN reviewed discharge instructions, medical action plan and red flags with patient who verbalized understanding. Patient given an opportunity to ask questions and does not have any further questions or concerns at this time. Were discharge instructions available to patient? Yes. Reviewed appropriate site of care based on symptoms and resources available to patient including: Specialist. The patient agrees to contact the PCP office for questions related to their healthcare. Medication reconciliation was performed with patient, who verbalizes understanding of administration of home medications. Advised obtaining a 90-day supply of all daily and as-needed medications. Pt is staying at her moms house in [place], she has her next surgery on 22. Pt said she is doing ok, has decreased appetite. Is to drink 2 protein shakes every day, one in the morning and one in the evenings. She is able to drink fluids well, denies any nausea or vomiting.  She will be returning back to her mothers home after this next surgery ACM provided contact information. Plan for follow-up call in 5-7 days based on severity of symptoms and risk factors. Plan for next call: discuss any symptoms, review pain level, following discharge instructions, preparing for next surgery on 22          [facility] Transitions Initial Follow Up Call    Call within 2 business days of discharge: Yes    Patient: Mason Loveless Patient : 1975   MRN: [id number]  Reason for Admission: Essential Tremor, placement of brain stimulator  Discharge Date: 20 RARS: No data recorded    Last Discharge [facility]       Complaint Diagnosis Description Type Department Provider    20 Ankle Pain Sprain of right foot, initial encounter . .. ED (DISCHARGE) [facility] ED Michelle Hammonds MD             Non-face-to-face services provided:  Obtained and reviewed discharge summary and/or continuity of care documents    Care Transitions 24 Hour Call    Do you have a copy of your discharge instructions?: Yes  Do you have all of your prescriptions and are they filled?: Yes  Have you been contacted by a [address] [place] [address] Avenue?: No  Have you scheduled your follow up appointment?: No (Comment: Pt has next surgery in 7 days at [address] in [place])  Were you discharged with any Home Care or Post Acute Services: No  Do you feel like you have everything you need to keep you well at home?: Yes  Care Transitions Interventions         Follow Up  No future appointments.     Dayanara Fernandez RN

## 2022-02-16 ENCOUNTER — CARE COORDINATION (OUTPATIENT)
Dept: OTHER | Facility: CLINIC | Age: 47
End: 2022-02-16

## 2022-02-16 NOTE — CARE COORDINATION
Care Transitions Outreach Attempt    Call within 2 business days of discharge: Yes   Attempted to reach patient for transitions of care follow up. Unable to reach patient. Patient: Bryce Heard Patient : 1975 MRN: [id number]    Last Discharge [facility]       Complaint Diagnosis Description Type Department Provider    20 Ankle Pain Sprain of right foot, initial encounter . .. ED (DISCHARGE) [facility] ED Mireya Anderson MD            Was this an external facility discharge? Yes, [id number] Discharge Facility: [facility]    Noted following upcoming appointments from discharge chart review:   [facility] follow up appointment(s): No future appointments. Non-[facility] follow up appointment(s): no    HIPAA compliant message left requesting a return phone call at patients convenience. Will continue to follow.    Pt is scheduled for surgery again stage 2 on  at [address] [place] [address], RN- [phone]  [phone]

## 2022-02-18 ENCOUNTER — CARE COORDINATION (OUTPATIENT)
Dept: OTHER | Facility: CLINIC | Age: 47
End: 2022-02-18

## 2022-02-18 NOTE — CARE COORDINATION
staying with her mom. She said her pain is tolerable, she is taking percocet about every 4 hours and says it helps some. She denies any nausea or vomiting today. Is able to eat and drink fluids. She had the stage 2 done as Outpatient yesterday at [facility]. She returns March 8th and 9th for programming and follow up with the neurosurgeon. Will follow         Care Transitions Subsequent and Final Call    Subsequent and Final Calls  Have your medications changed?: No  Do you have any questions related to your medications?: No  Do you currently have any active services?: No  Do you have any needs or concerns that I can assist you with?: No  Identified Barriers: None  Care Transitions Interventions  Other Interventions: Follow Up  No future appointments.     Lea Malloy RN

## 2022-03-01 ENCOUNTER — CARE COORDINATION (OUTPATIENT)
Dept: OTHER | Facility: CLINIC | Age: 47
End: 2022-03-01

## 2022-03-09 ENCOUNTER — CARE COORDINATION (OUTPATIENT)
Dept: OTHER | Facility: CLINIC | Age: 47
End: 2022-03-09

## 2022-03-09 NOTE — CARE COORDINATION
[facility] Transitions Follow Up Call    3/9/2022    Patient: David Kim  Patient : 1975   MRN: [id number]  Reason for Admission: 2022  Discharge Date: 20 RARS: No data recorded       Care Transitions Follow Up Call    Needs to be reviewed by the provider   Additional needs identified to be addressed with provider: No  none             Method of communication with provider : none      Care Transition Nurse (CTN) contacted the patient by telephone to follow up after admission on 22 Verified name and  with family as identifiers. Addressed changes since last contact: Pt has clearance now to return to work   Discussed follow-up appointments. If no appointment was previously scheduled, appointment scheduling offered: n/a. Is follow up appointment scheduled within 7 days of discharge? No.    Advance Care Planning:   Does patient have an Advance Directive: not on file. CTN reviewed discharge instructions, medical action plan and red flags with patient and discussed any barriers to care and/or understanding of plan of care after discharge. Discussed appropriate site of care based on symptoms and resources available to patient including: PCP, Specialist and [address] [place] [address]. The patient agrees to contact the PCP office for questions related to their healthcare. Patients top risk factors for readmission: recent surgery for implanting a brain stimulator for her tremors  Interventions to address risk factors: verified all follow up appts were attended, following medication and treatment plans      Non-[facility] follow up appointment(s): 3/8/2022 [facility]    CTN provided contact information for future needs. No further follow-up call indicated based on severity of symptoms and risk factors. Pt states her brain stimulator was turned on yesterday and the doctors at [facility] were very pleased with the results and said they feel it is a success.  Pt said she has a return to work note for Monday and she is very excited about returning to work. She said her energy level is good and feels very ready to return. Pt is returning back to her home in [place] she has been staying with her mom during her recovery. She has all her medications, no issues with affording them. Pt states she has everything she needs and has no further questions or concerns for me. ACM will sign off at this time due to no further care management needs identified at this time. Care Transitions Subsequent and Final Call    Subsequent and Final Calls  Have your medications changed?: No  Do you have any questions related to your medications?: No  Do you currently have any active services?: No  Do you have any needs or concerns that I can assist you with?: No  Identified Barriers: None  Care Transitions Interventions  Other Interventions: Follow Up  No future appointments.     Ramírez Santoro RN

## 2022-06-15 ENCOUNTER — HOSPITAL ENCOUNTER (OUTPATIENT)
Dept: PHYSICAL THERAPY | Age: 47
Setting detail: THERAPIES SERIES
Discharge: HOME | End: 2022-06-15
Payer: COMMERCIAL

## 2022-06-15 VITALS — OXYGEN SATURATION: 97 % | HEART RATE: 97 BPM | SYSTOLIC BLOOD PRESSURE: 113 MMHG | DIASTOLIC BLOOD PRESSURE: 85 MMHG

## 2022-06-15 PROCEDURE — 97110 THERAPEUTIC EXERCISES: CPT

## 2022-06-15 PROCEDURE — 97162 PT EVAL MOD COMPLEX 30 MIN: CPT

## 2022-06-15 ASSESSMENT — PAIN DESCRIPTION - LOCATION: LOCATION: KNEE

## 2022-06-15 ASSESSMENT — PAIN DESCRIPTION - DESCRIPTORS: DESCRIPTORS: ACHING

## 2022-06-15 ASSESSMENT — PAIN SCALES - GENERAL: PAINLEVEL_OUTOF10: 4

## 2022-06-15 ASSESSMENT — PAIN DESCRIPTION - FREQUENCY: FREQUENCY: INTERMITTENT

## 2022-06-15 ASSESSMENT — PAIN DESCRIPTION - ONSET: ONSET: AWAKENED FROM SLEEP

## 2022-06-15 ASSESSMENT — PAIN DESCRIPTION - PAIN TYPE: TYPE: ACUTE PAIN

## 2022-06-15 ASSESSMENT — PAIN DESCRIPTION - ORIENTATION: ORIENTATION: LEFT

## 2022-06-15 ASSESSMENT — PAIN - FUNCTIONAL ASSESSMENT: PAIN_FUNCTIONAL_ASSESSMENT: PREVENTS OR INTERFERES SOME ACTIVE ACTIVITIES AND ADLS
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
## 2022-06-22 ENCOUNTER — HOSPITAL ENCOUNTER (OUTPATIENT)
Dept: PHYSICAL THERAPY | Age: 47
Setting detail: THERAPIES SERIES
End: 2022-06-22
Payer: COMMERCIAL

## 2022-06-24 ENCOUNTER — HOSPITAL ENCOUNTER (OUTPATIENT)
Dept: PHYSICAL THERAPY | Age: 47
Setting detail: THERAPIES SERIES
Discharge: HOME | End: 2022-06-24
Payer: COMMERCIAL

## 2022-06-24 PROCEDURE — G0283 ELEC STIM OTHER THAN WOUND: HCPCS

## 2022-06-24 PROCEDURE — 97110 THERAPEUTIC EXERCISES: CPT

## 2022-06-24 ASSESSMENT — PAIN DESCRIPTION - ORIENTATION: ORIENTATION: LEFT

## 2022-06-24 ASSESSMENT — PAIN DESCRIPTION - FREQUENCY: FREQUENCY: INTERMITTENT

## 2022-06-24 ASSESSMENT — PAIN DESCRIPTION - PAIN TYPE: TYPE: ACUTE PAIN

## 2022-06-24 ASSESSMENT — PAIN DESCRIPTION - LOCATION: LOCATION: KNEE

## 2022-06-24 ASSESSMENT — PAIN DESCRIPTION - DESCRIPTORS: DESCRIPTORS: ACHING

## 2022-06-24 NOTE — PROGRESS NOTES
possible. Exercise 4: Supine, shoulder flex, 10 reps  Exercise 5: Supine, SAQ, 3#, 8-12 reps, bilat, progress to 2 sets when possible. 1 x 10 bilateral  Exercise 6: Standing, multifidus row, Paloff press, shoulder retraction, red t band, 10 reps  Exercise 7: Standing, hip abd, mini squat, heel raise, 10 reps  Exercise 8: Seated, LAQ, 3#, 8-12 reps, bilat, Progress to 2 sets when possible 1 x 10  Exercise 9: Sit to stand from mat table, 10 reps. Exercise 10: IFC with ice or MH as needed-x 10 mins with ice-supine                              Assessment:   Conditions Requiring Skilled Therapeutic Intervention  Body Structures, Functions, Activity Limitations Requiring Skilled Therapeutic Intervention: Decreased functional mobility ; Decreased ADL status; Decreased strength;Decreased high-level IADLs;Decreased balance;Decreased endurance; Increased pain;Decreased posture  Assessment: Initiated ex and estim per primary therapists recommendations. Patient did well with session. She required min verbal cueing for proper execution of ex. All ex performed with low reps today. Progress reps as able. She reported pain at 4/10 pre session and \"better\" post session. Barriers impacting rehab : Medical co-morbidities      Goals:  Short Term Goals  Time Frame for Short term goals: 3-4 weeks  Short term goal 1: Patient will perform HEP with min cueing. Short term goal 2: Patient will increase LE strength to be able to perform 2 ant step downs in 30 sec in order to perform fucntional mobility. Short term goal 3: Patient will increase functional ADL as demonstrated by improvment of LEFS to 50% impairment. Short term goal 4: Patient will increase functional ambulation as demonstrated by improvement of 6 Minute Walk Test to 800'.   Long Term Goals  Time Frame for Long term goals : 6-8 weeks  Long term goal 1: Patient will increase LE strength to be able to perform 4 ant step downs in 30 sec in order to perform fucntional mobility. Long term goal 2: Patient will perform HEP with no cueing. Long term goal 3: Patient will increase functional ADL as demonstrated by improvment of LEFS to 30% impairment. Long term goal 4: Patient will increase functional ambulation as demonstrated by improvement of 6 Minute Walk Test to 1000'.   Patient Goals   Patient goals : Less left knee pain    Plan:    Plan  Plan weeks: 6-8 weeks  Current Treatment Recommendations: Strengthening,ROM,Balance training,Functional mobility training,Transfer training,ADL/Self-care training,IADL training,Endurance training,Manual Therapy - Joint Manipulation,Manual Therapy - Soft Tissue Mobilization,Neuromuscular re-education,Stair training,Gait training,Pain management,Home exercise program,Therapeutic activities,Positioning,Modalities  Timed Code Treatment Minutes: 40 Minutes (plus 10 mins estim)     Therapy Time:   Individual Concurrent Group Co-treatment   Time In 1400         Time Out 3238         Minutes 52         Timed Code Treatment Minutes: 40 Minutes (plus 10 mins estim)       Leyla Iniguez PTA     Electronically signed by Leyla Iniguez PTA on 6/24/2022 at 3:39 PM

## 2022-06-27 ENCOUNTER — HOSPITAL ENCOUNTER (OUTPATIENT)
Dept: PHYSICAL THERAPY | Age: 47
Setting detail: THERAPIES SERIES
Discharge: HOME | End: 2022-06-27
Payer: COMMERCIAL

## 2022-06-27 PROCEDURE — G0283 ELEC STIM OTHER THAN WOUND: HCPCS

## 2022-06-27 PROCEDURE — 97110 THERAPEUTIC EXERCISES: CPT

## 2022-06-27 ASSESSMENT — PAIN DESCRIPTION - ORIENTATION: ORIENTATION: LEFT

## 2022-06-27 ASSESSMENT — PAIN DESCRIPTION - DESCRIPTORS: DESCRIPTORS: SORE;DULL;DISCOMFORT

## 2022-06-27 ASSESSMENT — PAIN DESCRIPTION - LOCATION: LOCATION: KNEE

## 2022-06-27 NOTE — PROGRESS NOTES
Physical Therapy  Daily Treatment Note  Date: 2022  Patient Name: Mei Houston  MRN: [id number]     :   1975    Subjective:      PT Visit Information  Total # of Visits to Date: 3  Plan of Care/Certification Expiration Date: 22  Progress Note Due Date: 07/15/22  Subjective: I am doing okay today. I am not having pain currently, just when I go from sitting to standing. Pain Screening  Patient Currently in Pain: No (Only with movement today)  Pain Location: Knee  Pain Orientation: Left  Pain Descriptors: Sore;Dull;Discomfort       Treatment Activities:   Exercises  Exercise 1: LBE or airdyne: 5 min-airdyne seat 4 x 5 mins  Exercise 2: Supine; SLR,  sidelying: hip abd; Prone: hip ext. 10 reps, bilat,  Progress to 2 sets when possible  Exercise 3: Supine, bridges, 10 reps,  Proceed to single leg bridge when possible. Exercise 4: Supine, shoulder flex, 10 reps  Exercise 5: Supine, SAQ, 3#, 8-12 reps, bilat, progress to 2 sets when possible. 1 x 15 bilateral  Exercise 6: Standing, multifidus row, Paloff press, shoulder retraction, red t band, 10 reps  Exercise 7: Standing, hip abd, mini squat, heel raise, 10 reps  Exercise 8: Seated, LAQ, 3#, 8-12 reps, bilat, Progress to 2 sets when possible 1 x 15  Exercise 9: Sit to stand from mat table, 10 reps. Exercise 10: IFC with ice or MH as needed-x 10 mins with ice-supine     Assessment:   Conditions Requiring Skilled Therapeutic Intervention  Body Structures, Functions, Activity Limitations Requiring Skilled Therapeutic Intervention: Decreased functional mobility ; Decreased ADL status; Decreased strength;Decreased high-level IADLs;Decreased balance;Decreased endurance; Increased pain;Decreased posture  Assessment: Patient reports with no current pain and is able to tolerate some increase in reps with no c/o pain. She shows noted increased weakness with exercises with mm trembling noted in LE with exercises.  Increased discomfort noted with STS from mat table but patient able to complete reps. Will continue with current POC and progress as able. Goals:Short Term Goals  Time Frame for Short term goals: 3-4 weeks  Short term goal 1: Patient will perform HEP with min cueing. Short term goal 2: Patient will increase LE strength to be able to perform 2 ant step downs in 30 sec in order to perform fucntional mobility. Short term goal 3: Patient will increase functional ADL as demonstrated by improvment of LEFS to 50% impairment. Short term goal 4: Patient will increase functional ambulation as demonstrated by improvement of 6 Minute Walk Test to 800'. Long Term Goals  Time Frame for Long term goals : 6-8 weeks  Long term goal 1: Patient will increase LE strength to be able to perform 4 ant step downs in 30 sec in order to perform fucntional mobility. Long term goal 2: Patient will perform HEP with no cueing. Long term goal 3: Patient will increase functional ADL as demonstrated by improvment of LEFS to 30% impairment. Long term goal 4: Patient will increase functional ambulation as demonstrated by improvement of 6 Minute Walk Test to 1000'.   Patient Goals   Patient goals : Less left knee pain    Plan:    Plan  Plan weeks: 6-8 weeks  Current Treatment Recommendations: Strengthening,ROM,Balance training,Functional mobility training,Transfer training,ADL/Self-care training,IADL training,Endurance training,Manual Therapy - Joint Manipulation,Manual Therapy - Soft Tissue Mobilization,Neuromuscular re-education,Stair training,Gait training,Pain management,Home exercise program,Therapeutic activities,Positioning,Modalities  Timed Code Treatment Minutes: 40 Minutes     Therapy Time   Individual Concurrent Group Co-treatment   Time In 1300         Time Out 1350         Minutes 50         Timed Code Treatment Minutes: 40 Minutes     Electronically signed by Stafford Goodpasture, PTA on 6/27/2022 at 3:22 PM

## 2022-06-29 ENCOUNTER — HOSPITAL ENCOUNTER (OUTPATIENT)
Dept: PHYSICAL THERAPY | Age: 47
Setting detail: THERAPIES SERIES
Discharge: HOME | End: 2022-06-29
Payer: COMMERCIAL

## 2022-06-29 PROCEDURE — 97110 THERAPEUTIC EXERCISES: CPT

## 2022-06-29 PROCEDURE — G0283 ELEC STIM OTHER THAN WOUND: HCPCS

## 2022-07-01 ASSESSMENT — PAIN SCALES - GENERAL: PAINLEVEL_OUTOF10: 2

## 2022-07-01 ASSESSMENT — PAIN DESCRIPTION - DESCRIPTORS: DESCRIPTORS: SORE;DULL

## 2022-07-01 ASSESSMENT — PAIN DESCRIPTION - ORIENTATION: ORIENTATION: LEFT

## 2022-07-01 ASSESSMENT — PAIN DESCRIPTION - LOCATION: LOCATION: KNEE

## 2022-07-01 NOTE — PROGRESS NOTES
Physical Therapy: Daily Note   Patient: Giancarlo Cruz (59 y.o. female)   Examination Date: 1536  Plan of Care/Certification Expiration Date: 22    No data recorded   :  1975 # of Visits since [facility]:   4   MRN: [id number]  CSN: [id number] Start of Care Date:   6/15/2022   Insurance: Payor: Narda Shah / Plan: Malini Hartmann [address] / Product Type: *No Product type* /   Insurance ID: [id number] - ([facility]) Secondary Insurance (if applicable):    Referring Physician: Rozena Gosselin   PCP: Alberto Manzo, DO Visits to Date/Visits Approved: 3 /      No Show/Cancelled Appts:   /       Medical Diagnosis: Pain in left knee [M25.562]    No data recorded      SUBJECTIVE EXAMINATION   Pain Level: Pain Screening  Patient Currently in Pain: Yes  Pain Assessment: 0-10  Pain Level: 2  Pain Location: Knee  Pain Orientation: Left  Pain Descriptors: Sore,Dull    Patient Comments: Subjective: Continues with most pain with sit to stand. She did not have any particular increase of pain after last session and reports that estim gives relief for at least an hr after sessions. OBJECTIVE EXAMINATION   Restrictions:  No data recorded No data recorded No data recorded      TREATMENT     Exercises:      Treatment Reasoning    Exercise 1: LBE or airdyne: 5 min-airdyne seat 4 x 5 mins  Exercise 2: Supine; SLR,  sidelying: hip abd; Prone: hip ext. 10 reps, bilat,  Progress to 2 sets when possible  Exercise 3: Supine, bridges, 10 reps,  single leg x 5 bilaterally  Exercise 4: Supine, shoulder flex, 10 reps  Exercise 5: Supine, SAQ, 3#, 8-12 reps, bilat, progress to 2 sets when possible.       1 x 15 bilateral  Exercise 6: Standing, multifidus row, Paloff press, shoulder retraction, red t band, 10 reps  Exercise 7: Standing, hip abd, mini squat, heel raise, 10 reps  Exercise 8: Seated, LAQ, 3#, 8-12 reps, bilat, Progress to 2 sets when possible 1 x 15  Exercise 9: Sit to stand from mat table, 10 reps. Exercise 10: IFC with ice or MH as needed-x 10 mins with ice-supine     Limitations addressed: Mobility,Strength,Balance,Activity tolerance,Posture,Pain modulation   Therapist provided: Verbal cuing   Progressed: Repetitions   Functional ability(s) targeted: Ambulating community distances,Transfers,Tolerance to age appropriate activities                     ASSESSMENT     Assessment: Assessment: Pt tolerated today's session well and shows increasing familiarity with exercise routine. Less pain with sit to stand today vs previous visit. Issued formal HEP and red t-band, with exercises reviewed and pt denying any question. Ended with IFC and ice to L knee and she reported no pain at end of session. Body Structures, Functions, Activity Limitations Requiring Skilled Therapeutic Intervention: Decreased functional mobility ,Decreased ADL status,Decreased strength,Decreased high-level IADLs,Decreased balance,Decreased endurance,Increased pain,Decreased posture    Post-Treatment Pain Level: Activity Tolerance: Patient tolerated treatment well    No data recorded     GOALS   Patient goals : Less left knee pain  Short Term Goals Completed by 3-4 weeks Current Status Goal Status   Patient will perform HEP with min cueing. Patient will increase LE strength to be able to perform 2 ant step downs in 30 sec in order to perform fucntional mobility. Patient will increase functional ADL as demonstrated by improvment of LEFS to 50% impairment. Patient will increase functional ambulation as demonstrated by improvement of 6 Minute Walk Test to 800'. Long Term Goals Completed by 6-8 weeks Current Status Goal Status   Patient will increase LE strength to be able to perform 4 ant step downs in 30 sec in order to perform fucntional mobility. Patient will perform HEP with no cueing.        Patient will increase functional ADL as demonstrated by improvment of LEFS to 30% impairment. Patient will increase functional ambulation as demonstrated by improvement of 6 Minute Walk Test to 1000'.                                                             TREATMENT PLAN   Plan Frequency: 2 X weekly  Plan weeks: 6-8 weeks  Current Treatment Recommendations: Strengthening,ROM,Balance training,Functional mobility training,Transfer training,ADL/Self-care training,IADL training,Endurance training,Manual Therapy - Joint Manipulation,Manual Therapy - Soft Tissue Mobilization,Neuromuscular re-education,Stair training,Gait training,Pain management,Home exercise program,Therapeutic activities,Positioning,Modalities   Requires PT Follow-Up: Yes       Therapy Time  Individual Time In: 6773       Individual Time Out: 1600  Minutes: 57  Timed Code Treatment Minutes: 57 Minutes     Electronically signed by Mike Bowen PT  on 7/1/2022 at 9:40 AM

## 2022-07-05 ENCOUNTER — HOSPITAL ENCOUNTER (OUTPATIENT)
Dept: PHYSICAL THERAPY | Age: 47
Setting detail: THERAPIES SERIES
Discharge: HOME | End: 2022-07-05
Payer: COMMERCIAL

## 2022-07-05 PROCEDURE — G0283 ELEC STIM OTHER THAN WOUND: HCPCS

## 2022-07-05 PROCEDURE — 97110 THERAPEUTIC EXERCISES: CPT

## 2022-07-05 ASSESSMENT — PAIN DESCRIPTION - LOCATION: LOCATION: KNEE

## 2022-07-05 ASSESSMENT — PAIN SCALES - GENERAL: PAINLEVEL_OUTOF10: 6

## 2022-07-05 ASSESSMENT — PAIN DESCRIPTION - DESCRIPTORS: DESCRIPTORS: ACHING;DULL;SORE

## 2022-07-05 ASSESSMENT — PAIN DESCRIPTION - PAIN TYPE: TYPE: ACUTE PAIN

## 2022-07-05 ASSESSMENT — PAIN DESCRIPTION - ORIENTATION: ORIENTATION: LEFT
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
## 2022-07-07 ENCOUNTER — HOSPITAL ENCOUNTER (OUTPATIENT)
Dept: PHYSICAL THERAPY | Age: 47
Setting detail: THERAPIES SERIES
Discharge: HOME | End: 2022-07-07
Payer: COMMERCIAL

## 2022-07-07 PROCEDURE — G0283 ELEC STIM OTHER THAN WOUND: HCPCS

## 2022-07-07 PROCEDURE — 97110 THERAPEUTIC EXERCISES: CPT

## 2022-07-07 ASSESSMENT — PAIN SCALES - GENERAL: PAINLEVEL_OUTOF10: 5

## 2022-07-07 ASSESSMENT — PAIN DESCRIPTION - DESCRIPTORS: DESCRIPTORS: SORE;DULL

## 2022-07-07 ASSESSMENT — PAIN DESCRIPTION - ORIENTATION: ORIENTATION: LEFT

## 2022-07-07 ASSESSMENT — PAIN DESCRIPTION - FREQUENCY: FREQUENCY: INTERMITTENT

## 2022-07-07 ASSESSMENT — PAIN DESCRIPTION - LOCATION: LOCATION: KNEE
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
## 2022-07-11 ENCOUNTER — HOSPITAL ENCOUNTER (OUTPATIENT)
Dept: PHYSICAL THERAPY | Age: 47
Setting detail: THERAPIES SERIES
Discharge: HOME | End: 2022-07-11
Payer: COMMERCIAL

## 2022-07-11 PROCEDURE — G0283 ELEC STIM OTHER THAN WOUND: HCPCS

## 2022-07-11 PROCEDURE — 97110 THERAPEUTIC EXERCISES: CPT

## 2022-07-11 ASSESSMENT — PAIN DESCRIPTION - ORIENTATION: ORIENTATION: LEFT

## 2022-07-11 ASSESSMENT — PAIN DESCRIPTION - LOCATION: LOCATION: KNEE

## 2022-07-11 ASSESSMENT — PAIN SCALES - GENERAL: PAINLEVEL_OUTOF10: 4

## 2022-07-11 ASSESSMENT — PAIN DESCRIPTION - DESCRIPTORS: DESCRIPTORS: ACHING;DULL

## 2022-07-11 ASSESSMENT — PAIN DESCRIPTION - PAIN TYPE: TYPE: ACUTE PAIN
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
## 2022-07-14 ENCOUNTER — HOSPITAL ENCOUNTER (OUTPATIENT)
Dept: PHYSICAL THERAPY | Age: 47
Setting detail: THERAPIES SERIES
Discharge: HOME | End: 2022-07-14
Payer: COMMERCIAL

## 2022-07-14 PROCEDURE — G0283 ELEC STIM OTHER THAN WOUND: HCPCS

## 2022-07-14 PROCEDURE — 97110 THERAPEUTIC EXERCISES: CPT

## 2022-07-14 ASSESSMENT — PAIN DESCRIPTION - FREQUENCY: FREQUENCY: INTERMITTENT

## 2022-07-14 ASSESSMENT — PAIN DESCRIPTION - LOCATION: LOCATION: KNEE

## 2022-07-14 ASSESSMENT — PAIN SCALES - GENERAL: PAINLEVEL_OUTOF10: 3

## 2022-07-14 ASSESSMENT — PAIN DESCRIPTION - DESCRIPTORS: DESCRIPTORS: ACHING;DULL
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
## 2022-07-18 ENCOUNTER — HOSPITAL ENCOUNTER (OUTPATIENT)
Dept: PHYSICAL THERAPY | Age: 47
Setting detail: THERAPIES SERIES
Discharge: HOME | End: 2022-07-18
Payer: COMMERCIAL

## 2022-07-18 PROCEDURE — 97110 THERAPEUTIC EXERCISES: CPT

## 2022-07-18 PROCEDURE — G0283 ELEC STIM OTHER THAN WOUND: HCPCS

## 2022-07-18 ASSESSMENT — PAIN DESCRIPTION - LOCATION: LOCATION: KNEE

## 2022-07-18 ASSESSMENT — PAIN SCALES - GENERAL: PAINLEVEL_OUTOF10: 3

## 2022-07-18 ASSESSMENT — PAIN DESCRIPTION - PAIN TYPE: TYPE: ACUTE PAIN;CHRONIC PAIN

## 2022-07-18 ASSESSMENT — PAIN DESCRIPTION - ORIENTATION: ORIENTATION: LEFT

## 2022-07-18 ASSESSMENT — PAIN DESCRIPTION - DESCRIPTORS: DESCRIPTORS: ACHING;SORE;DULL

## 2022-07-18 NOTE — PROGRESS NOTES
Physical Therapy  Daily Treatment Note  Date: 2022  Patient Name: Nasrin Mcelroy  MRN: [id number]     :   1975    Subjective:   PT Visit Information  Total # of Visits Approved:  (12-16 visits)  Total # of Visits to Date: 9  Plan of Care/Certification Expiration Date: 22  Progress Note Due Date: 22  Progress Note Counter: 30 Visits per year  Subjective: Its feeling okay today, hurting about at a 3/10. Pain Screening  Patient Currently in Pain: Yes  Pain Assessment: 0-10  Pain Level: 3  Pain Type: Acute pain;Chronic pain  Pain Location: Knee  Pain Orientation: Left  Pain Descriptors: Aching;Sore;Dull    Treatment Activities:   Exercises  Exercise 1: LBE or airdyne: 5 min-airdyne seat 4 x 5 mins  Exercise 2: Supine;  4 way hip  2 sets of 10 reps with 1 pound weight. Exercise 3: Supine, bridges, 15 reps,  single leg x 10 bilaterally  Exercise 4: Supine, shoulder flex, 15 reps  Exercise 5: Supine, SAQ      2 x 15 bilateral  with 3 pound weights  Exercise 6: Standing, multifidus row, Paloff press, shoulder retraction, green t band, 15 reps  Exercise 7: Standing, hip abd, hip ext,  mini squat, heel raise, 15 reps  Exercise 8: Seated, LAQ, bilat, 2 sets of 15 reps with 3 pound weights  Exercise 9: Sit to stand from mat table, 15 reps. Exercise 10: IFC with ice or MH as needed-                IFC with ICE x 20 mins in supine to left knee   Assessment:   Conditions Requiring Skilled Therapeutic Intervention  Body Structures, Functions, Activity Limitations Requiring Skilled Therapeutic Intervention: Decreased functional mobility ; Decreased ADL status; Decreased strength;Decreased high-level IADLs;Decreased balance;Decreased endurance; Increased pain;Decreased posture  Assessment: Increased reps this date with patient having noted fatigue but able to complete reps with no c/o increased pain. Some cues needed to ensure correct mm engagement for max benefit. She reports improvement in symptoms post tx. Will continue to progress as able. Treatment Diagnosis: Left knee DJD   Goals:Short Term Goals  Time Frame for Short term goals: 3-4 weeks  Short term goal 1: Patient will perform HEP with min cueing. STG 1 Current Status[de-identified] 07/01 HEP issued. States she has her HEP and is performing some at home. --progress  STG Goal 1 Status[de-identified] In progress  Short term goal 2: Patient will increase LE strength to be able to perform 2 ant step downs in 30 sec in order to perform fucntional mobility. STG 2 Current Status[de-identified] 7/14/22 Patient is able to go up and down 2 stairs reciprocally within 30 seconds. -  STG Goal 2 Status[de-identified] In progress  Short term goal 3: Patient will increase functional ADL as demonstrated by improvment of LEFS to 50% impairment. STG 3 Current Status[de-identified] 7/14/22 Patient scored 41% impairment on the LEFS. --met  STG Goal 3 Status[de-identified] Met  Short term goal 4: Patient will increase functional ambulation as demonstrated by improvement of 6 Minute Walk Test to 800'. Long Term Goals  Time Frame for Long term goals : 6-8 weeks  Long term goal 1: Patient will increase LE strength to be able to perform 4 ant step downs in 30 sec in order to perform fucntional mobility. LTG 1 Current Status[de-identified] See STG #2  LTG Goal 1 Status[de-identified] In progress  Long term goal 2: Patient will perform HEP with no cueing. LTG 2 Current Status[de-identified] See STG #1  LTG Goal 2 Status[de-identified] In progress  Long term goal 3: Patient will increase functional ADL as demonstrated by improvment of LEFS to 30% impairment. LTG 3 Current Status[de-identified] See STG #3  LTG Goal 3 Status[de-identified] In progress  Long term goal 4: Patient will increase functional ambulation as demonstrated by improvement of 6 Minute Walk Test to 1000'.   LTG 4 Current Status[de-identified] See STG # 4  LTG Goal 4 Status[de-identified] In progress  Patient Goals   Patient goals : Less left knee pain  Plan:    Plan  Plan weeks: 6-8 weeks  Current Treatment Recommendations: Strengthening, ROM, Balance training, Functional mobility training, Transfer training, ADL/Self-care training, IADL training, Endurance training, Manual Therapy - Joint Manipulation, Manual Therapy - Soft Tissue Mobilization, Neuromuscular re-education, Stair training, Gait training, Pain management, Home exercise program, Therapeutic activities, Positioning, Modalities  Timed Code Treatment Minutes: 36 Minutes     Therapy Time   Individual Concurrent Group Co-treatment   Time In 1400         Time Out 1456         Minutes 56         Timed Code Treatment Minutes: 36 Minutes  Electronically signed by [facility] TEE Purdy on 7/18/2022 at 4:09 PM

## 2022-07-22 ENCOUNTER — HOSPITAL ENCOUNTER (OUTPATIENT)
Dept: PHYSICAL THERAPY | Age: 47
Setting detail: THERAPIES SERIES
Discharge: HOME | End: 2022-07-22
Payer: COMMERCIAL

## 2022-07-22 PROCEDURE — G0283 ELEC STIM OTHER THAN WOUND: HCPCS

## 2022-07-22 PROCEDURE — 97110 THERAPEUTIC EXERCISES: CPT

## 2022-07-22 ASSESSMENT — PAIN DESCRIPTION - LOCATION: LOCATION: KNEE;LEG

## 2022-07-22 ASSESSMENT — PAIN DESCRIPTION - ORIENTATION: ORIENTATION: LEFT

## 2022-07-22 ASSESSMENT — PAIN DESCRIPTION - PAIN TYPE: TYPE: CHRONIC PAIN;ACUTE PAIN

## 2022-07-22 ASSESSMENT — PAIN SCALES - GENERAL: PAINLEVEL_OUTOF10: 4

## 2022-07-22 ASSESSMENT — PAIN DESCRIPTION - DESCRIPTORS: DESCRIPTORS: ACHING;DULL;SORE

## 2022-07-22 NOTE — PROGRESS NOTES
Assessment:   Conditions Requiring Skilled Therapeutic Intervention  Body Structures, Functions, Activity Limitations Requiring Skilled Therapeutic Intervention: Decreased functional mobility ; Decreased ADL status; Decreased strength;Decreased high-level IADLs;Decreased balance;Decreased endurance; Increased pain;Decreased posture  Assessment: Patient did well with session. She was able add weight to standing exericses today. She reports increased discomfort with sit to stands and squats today. May try adding more exericses next visit if patient is able. Treatment Diagnosis: Left knee DJD  Therapy Prognosis: Good        Goals:Short Term Goals  Time Frame for Short term goals: 3-4 weeks  Short term goal 1: Patient will perform HEP with min cueing. STG 1 Current Status[de-identified] 07/01 HEP issued. States she has her HEP and is performing some at home. --progress  STG Goal 1 Status[de-identified] In progress  Short term goal 2: Patient will increase LE strength to be able to perform 2 ant step downs in 30 sec in order to perform fucntional mobility. STG 2 Current Status[de-identified] 7/14/22 Patient is able to go up and down 2 stairs reciprocally within 30 seconds. -  STG Goal 2 Status[de-identified] In progress  Short term goal 3: Patient will increase functional ADL as demonstrated by improvment of LEFS to 50% impairment. STG 3 Current Status[de-identified] 7/14/22 Patient scored 41% impairment on the LEFS. --met  STG Goal 3 Status[de-identified] Met  Short term goal 4: Patient will increase functional ambulation as demonstrated by improvement of 6 Minute Walk Test to 800'. Long Term Goals  Time Frame for Long term goals : 6-8 weeks  Long term goal 1: Patient will increase LE strength to be able to perform 4 ant step downs in 30 sec in order to perform fucntional mobility. LTG 1 Current Status[de-identified] See STG #2  LTG Goal 1 Status[de-identified] In progress  Long term goal 2: Patient will perform HEP with no cueing.   LTG 2 Current Status[de-identified] See STG #1  LTG Goal 2 Status[de-identified] In progress  Long term goal 3: Patient will increase functional ADL as demonstrated by improvment of LEFS to 30% impairment. LTG 3 Current Status[de-identified] See STG #3  LTG Goal 3 Status[de-identified] In progress  Long term goal 4: Patient will increase functional ambulation as demonstrated by improvement of 6 Minute Walk Test to 1000'.   LTG 4 Current Status[de-identified] See STG # 4  LTG Goal 4 Status[de-identified] In progress  Patient Goals   Patient goals : Less left knee pain      Plan:    Plan  Plan weeks: 6-8 weeks  Current Treatment Recommendations: Strengthening, ROM, Balance training, Functional mobility training, Transfer training, ADL/Self-care training, IADL training, Endurance training, Manual Therapy - Joint Manipulation, Manual Therapy - Soft Tissue Mobilization, Neuromuscular re-education, Stair training, Gait training, Pain management, Home exercise program, Therapeutic activities, Positioning, Modalities  Timed Code Treatment Minutes: 40 Minutes       Therapy Time   Individual Concurrent Group Co-treatment   Time In 9210         Time Out 1359         Minutes 60         Timed Code Treatment Minutes: 40 Minutes       Electronically signed by Rivera Do PTA on 7/22/2022 at 3:02 PM

## 2022-07-25 ENCOUNTER — HOSPITAL ENCOUNTER (OUTPATIENT)
Dept: PHYSICAL THERAPY | Age: 47
Setting detail: THERAPIES SERIES
Discharge: HOME | End: 2022-07-25
Payer: COMMERCIAL

## 2022-07-25 PROCEDURE — 97110 THERAPEUTIC EXERCISES: CPT

## 2022-07-25 PROCEDURE — G0283 ELEC STIM OTHER THAN WOUND: HCPCS

## 2022-07-25 ASSESSMENT — PAIN SCALES - GENERAL: PAINLEVEL_OUTOF10: 3

## 2022-07-25 ASSESSMENT — PAIN DESCRIPTION - PAIN TYPE: TYPE: CHRONIC PAIN

## 2022-07-25 ASSESSMENT — PAIN DESCRIPTION - LOCATION: LOCATION: KNEE

## 2022-07-25 ASSESSMENT — PAIN DESCRIPTION - ORIENTATION: ORIENTATION: LEFT

## 2022-07-25 NOTE — PROGRESS NOTES
Physical Therapy  Daily Treatment Note  Date: 2022  Patient Name: Elle Villalta  MRN: [id number]     :   1975    Referring Physician: Jason Feliciano   PCP: Addi Botello DO    Medical Diagnosis: Pain in left knee [M25.562] Left knee pain  Treatment Diagnosis: Left knee DJD      Insurance: Payor: JEANNETTE / Plan: [employer] [address] / Product Type: *No Product type* /   Insurance ID: [id number] - [facility]    Subjective:   General  Diagnosis: Left knee pain  Referring Provider (secondary): Josef Iglesias PT Insurance Information: [facility]  Ref#: [id number]  Policy#: [id number]   Contact: Availity  Total # of Visits Approved:  ()  Total # of Visits to Date: 6  Plan of Care/Certification Expiration Date: 22  Progress Note Due Date: 22  Progress Note Counter: 30 Visits per year  Subjective: it's not too bad, maybe a 3/10. Patient Currently in Pain: Yes  Pain Level: 3  Pain Type: Chronic pain  Pain Location: Knee  Pain Orientation: Left       Treatment Activities:  Exercises:      Treatment Reasoning    Exercise 1: LBE or airdyne: 5 min-airdyne seat 4 x 5 mins  Exercise 2: Supine;  4 way hip  2 sets of 10 reps with 1 pound weight. Exercise 3: Supine, bridges, 15 reps,  single leg x 10 bilaterally  Exercise 4: Supine, shoulder flex, 15 reps  Exercise 5: Supine, SAQ      2 x 15 bilateral  with 3 pound weights  Exercise 6: Seated, LAQ, bilat, 2 sets of 15 reps with 3 pound weights  Exercise 7: Standing, hip abd, hip ext,  mini squat, heel raise, 10 reps with 3 pound weights  Exercise 8: Standing, multifidus row, Paloff press, shoulder retraction, blue t band, 15 reps  Exercise 9: Sit to stand from mat table, 10 reps.   Exercise 10: IFC with ice or MH as needed-                IFC with ICE x 20 mins in supine to left knee                           Assessment:   Conditions Requiring Skilled Therapeutic Intervention  Body Structures, Functions, Activity Limitations Requiring Skilled Therapeutic Intervention: Decreased functional mobility ; Decreased ADL status; Decreased strength;Decreased high-level IADLs;Decreased balance;Decreased endurance; Increased pain;Decreased posture  Assessment: Patient able to go through her routine with little to no cues required being able to move from one exercise to the next and perform the proper amount of repetitions. Will monitor her progress and advance accordingly. Treatment Diagnosis: Left knee DJD  Requires PT Follow-Up: Yes     Goals:  Short Term Goals  Time Frame for Short term goals: 3-4 weeks  Short term goal 1: Patient will perform HEP with min cueing. STG 1 Current Status[de-identified] 07/01 HEP issued. States she has her HEP and is performing some at home. --progress  STG Goal 1 Status[de-identified] In progress  Short term goal 2: Patient will increase LE strength to be able to perform 2 ant step downs in 30 sec in order to perform fucntional mobility. STG 2 Current Status[de-identified] 7/14/22 Patient is able to go up and down 2 stairs reciprocally within 30 seconds. -  STG Goal 2 Status[de-identified] In progress  Short term goal 3: Patient will increase functional ADL as demonstrated by improvment of LEFS to 50% impairment. STG 3 Current Status[de-identified] 7/14/22 Patient scored 41% impairment on the LEFS. --met  STG Goal 3 Status[de-identified] Met  Short term goal 4: Patient will increase functional ambulation as demonstrated by improvement of 6 Minute Walk Test to 800'. Long Term Goals  Time Frame for Long term goals : 6-8 weeks  Long term goal 1: Patient will increase LE strength to be able to perform 4 ant step downs in 30 sec in order to perform fucntional mobility. LTG 1 Current Status[de-identified] See STG #2  LTG Goal 1 Status[de-identified] In progress  Long term goal 2: Patient will perform HEP with no cueing.   LTG 2 Current Status[de-identified] See STG #1  LTG Goal 2 Status[de-identified] In progress  Long term goal 3: Patient will increase functional ADL as demonstrated by improvment of LEFS to 30% impairment. LTG 3 Current Status[de-identified] See STG #3  LTG Goal 3 Status[de-identified] In progress  Long term goal 4: Patient will increase functional ambulation as demonstrated by improvement of 6 Minute Walk Test to 1000'.   LTG 4 Current Status[de-identified] See STG # 4  LTG Goal 4 Status[de-identified] In progress  Patient Goals   Patient goals : Less left knee pain    Plan:    Plan weeks: 6-8 weeks  Current Treatment Recommendations: Strengthening, ROM, Balance training, Functional mobility training, Transfer training, ADL/Self-care training, IADL training, Endurance training, Manual Therapy - Joint Manipulation, Manual Therapy - Soft Tissue Mobilization, Neuromuscular re-education, Stair training, Gait training, Pain management, Home exercise program, Therapeutic activities, Positioning, Modalities       Therapy Time:   Individual Concurrent Group Co-treatment   Time In [facility]-[id number]         Time Out 1553         Minutes 59         Timed Code Treatment Minutes: [address] [facility]   Electronically signed by Daniel Parada PTA on 7/25/2022 at 3:54 PM

## 2022-07-28 ENCOUNTER — APPOINTMENT (OUTPATIENT)
Dept: PHYSICAL THERAPY | Age: 47
End: 2022-07-28
Payer: COMMERCIAL

## 2022-08-01 ENCOUNTER — HOSPITAL ENCOUNTER (OUTPATIENT)
Dept: PHYSICAL THERAPY | Age: 47
Setting detail: THERAPIES SERIES
Discharge: HOME | End: 2022-08-01
Payer: COMMERCIAL

## 2022-08-01 PROCEDURE — G0283 ELEC STIM OTHER THAN WOUND: HCPCS

## 2022-08-01 PROCEDURE — 97110 THERAPEUTIC EXERCISES: CPT

## 2022-08-01 ASSESSMENT — PAIN DESCRIPTION - LOCATION: LOCATION: KNEE

## 2022-08-01 ASSESSMENT — PAIN DESCRIPTION - FREQUENCY: FREQUENCY: INTERMITTENT

## 2022-08-01 ASSESSMENT — PAIN DESCRIPTION - ORIENTATION: ORIENTATION: LEFT

## 2022-08-01 ASSESSMENT — PAIN SCALES - GENERAL: PAINLEVEL_OUTOF10: 2

## 2022-08-01 ASSESSMENT — PAIN DESCRIPTION - PAIN TYPE: TYPE: CHRONIC PAIN

## 2022-08-01 ASSESSMENT — PAIN DESCRIPTION - DESCRIPTORS: DESCRIPTORS: ACHING;DULL;SORE

## 2022-08-01 NOTE — PROGRESS NOTES
Physical Therapy  Daily Treatment Note  Date: 2022  Patient Name: Manuela Oh  MRN: [id number]     :   1975    Referring Physician: Keesha Davidson   PCP: Gurinder Fan DO    Medical Diagnosis: Pain in left knee [M25.562] Left knee pain  Treatment Diagnosis: Left knee DJD      Insurance: Payor: [facility] / [facility]: [facility] [address] / Product Type: *No Product type* /   Insurance ID: [id number] - ([facility])    Subjective:   General  Diagnosis: Left knee pain  Referring Provider (secondary): Alexandr Bernstein  PT Visit Information  PT Insurance Information: [address] [employer]  Ref#: [id number]  Policy#: [id number]   Contact: Availity  Total # of Visits Approved:  (-)  Total # of Visits to Date: 15  Plan of Care/Certification Expiration Date: 22  Progress Note Due Date: 22  Progress Note Counter: 30 Visits per year  Subjective  Subjective: Doing good today. Pain Screening  Patient Currently in Pain: Yes  Pain Assessment: 0-10  Pain Level: 2  Pain Type: Chronic pain  Pain Location: Knee  Pain Orientation: Left  Pain Descriptors: Aching, Dull, Sore  Pain Frequency: Intermittent       Treatment Activities:  Exercises:      Treatment Reasoning    Exercise 1: LBE or airdyne: 5 min-airdyne seat 4 x 5 mins  Exercise 2: Supine;  4 way hip  2 sets of 10 reps with 1 pound weight. Exercise 3: Supine, bridges, 15 reps,  single leg x 10 bilaterally  Exercise 4: Supine, shoulder flex, 15 reps  Exercise 5: Supine, SAQ      2 x 15 bilateral  with 3 pound weights  Exercise 6: Seated, LAQ, bilat, 2 sets of 15 reps with 3 pound weights  Exercise 7: Standing, hip abd, hip ext,  mini squat, heel raise, 10 reps with 3 pound weights  Exercise 8: Standing, multifidus row, Paloff press, shoulder retraction, blue t band, 15 reps  Exercise 9: Sit to stand from mat table, 10 reps.   Exercise 10: IFC with ice or MH as needed-                IFC with ICE x 20 mins in supine to left Patient will increase functional ADL as demonstrated by improvment of LEFS to 30% impairment. LTG 3 Current Status[de-identified] See STG #3  LTG Goal 3 Status[de-identified] In progress  Long term goal 4: Patient will increase functional ambulation as demonstrated by improvement of 6 Minute Walk Test to 1000'.   LTG 4 Current Status[de-identified] See STG # 4  LTG Goal 4 Status[de-identified] In progress  Patient Goals   Patient goals : Less left knee pain    Plan:    Plan  Plan weeks: 6-8 weeks  Current Treatment Recommendations: Strengthening, ROM, Balance training, Functional mobility training, Transfer training, ADL/Self-care training, IADL training, Endurance training, Manual Therapy - Joint Manipulation, Manual Therapy - Soft Tissue Mobilization, Neuromuscular re-education, Stair training, Gait training, Pain management, Home exercise program, Therapeutic activities, Positioning, Modalities  Timed Code Treatment Minutes: 39 Minutes (plus 20 mins estim)     Therapy Time:   Individual Concurrent Group Co-treatment   Time In 1550         Time Out 1649         Minutes 59         Timed Code Treatment Minutes: 39 Minutes (plus 20 mins estim)       Nicolasa Rios PTA     Electronically signed by Nicolasa Rios PTA on 8/1/2022 at 4:52 PM

## 2022-08-03 ENCOUNTER — HOSPITAL ENCOUNTER (OUTPATIENT)
Dept: PHYSICAL THERAPY | Age: 47
Setting detail: THERAPIES SERIES
Discharge: HOME | End: 2022-08-03
Payer: COMMERCIAL

## 2022-08-03 PROCEDURE — 97110 THERAPEUTIC EXERCISES: CPT

## 2022-08-03 PROCEDURE — G0283 ELEC STIM OTHER THAN WOUND: HCPCS

## 2022-08-03 ASSESSMENT — PAIN DESCRIPTION - PAIN TYPE: TYPE: CHRONIC PAIN

## 2022-08-03 ASSESSMENT — PAIN SCALES - GENERAL: PAINLEVEL_OUTOF10: 4

## 2022-08-03 ASSESSMENT — PAIN DESCRIPTION - LOCATION: LOCATION: BACK;KNEE

## 2022-08-03 ASSESSMENT — PAIN DESCRIPTION - DESCRIPTORS: DESCRIPTORS: ACHING;DULL

## 2022-08-05 NOTE — PROGRESS NOTES
Physical Therapy  Daily Treatment Note  Date: 8/3/2022  Patient Name: Julia Duvall  MRN: [id number]     :   1975    Referring Physician: Jorje Lopez   PCP: Amy Steve DO    Medical Diagnosis: Pain in left knee [M25.562] Left knee pain  Treatment Diagnosis: Left knee DJD      Insurance: Payor: [employer] / Plan: [employer] [address] / Product Type: *No Product type* /   Insurance ID: [id number] - ([facility])    Subjective:   General  Diagnosis: Left knee pain  Referring Provider (secondary): Criselda Swartz  PT Visit Information  PT Insurance Information: Shanel Mcneal  Ref#: [id number]  Policy#: [id number]   Contact: Availity  Total # of Visits Approved: 16 ()  Total # of Visits to Date: 15  Plan of Care/Certification Expiration Date: 22  Progress Note Due Date: 22  Progress Note Counter: 30 Visits per year  Subjective  Subjective: 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 She concedes she has improved some regarding knee pain and exercising has been helpful. Pain Screening  Pain Level: 4 (2/10 in knee)  Pain Type: Chronic pain  Pain Location: Back, Knee  Pain Descriptors: Aching, Dull       Treatment Activities:  Exercises:      Treatment Reasoning    Exercise 1: airdyne: 5 min-airdyne seat 4 x 5 mins  Exercise 2: Supine;  4 way hip  2 sets of 10 reps with 1 pound weight. Exercise 3: Supine, bridges, 15 reps,  single leg x 10 bilaterally  Exercise 4: Supine, shoulder flex, 15 reps  Exercise 5: Supine, SAQ      2 x 15 bilateral  with 3 pound weights  Exercise 6: Seated, LAQ, bilat, 2 sets of 15 reps with 3 pound weights  Exercise 7: Standing, hip abd, hip ext,  mini squat, heel raise, 10 reps with 3 pound weights  Exercise 8: Standing, multifidus row, Paloff press, shoulder retraction, blue t band, 15 reps  Exercise 9: Sit to stand from mat table, 10 reps.   Exercise 10: IFC with ice or MH as needed- ambulation as demonstrated by improvement of 6 Minute Walk Test to 1000'.   LTG 4 Current Status[de-identified] See STG # 4  LTG Goal 4 Status[de-identified] In progress  Patient Goals   Patient goals : Less left knee pain    Plan:    Plan  Plan weeks: 6-8 weeks  Current Treatment Recommendations: Strengthening, ROM, Balance training, Functional mobility training, Transfer training, ADL/Self-care training, IADL training, Endurance training, Manual Therapy - Joint Manipulation, Manual Therapy - Soft Tissue Mobilization, Neuromuscular re-education, Stair training, Gait training, Pain management, Home exercise program, Therapeutic activities, Positioning, Modalities  Timed Code Treatment Minutes: 38 Minutes (20 mins e-stim)     Therapy Time:   Individual Concurrent Group Co-treatment   Time In  [address], PT

## 2022-08-08 ENCOUNTER — APPOINTMENT (OUTPATIENT)
Dept: GENERAL RADIOLOGY | Age: 47
End: 2022-08-08
Payer: COMMERCIAL

## 2022-08-08 ENCOUNTER — HOSPITAL ENCOUNTER (EMERGENCY)
Age: 47
Discharge: HOME | End: 2022-08-08
Payer: COMMERCIAL

## 2022-08-08 VITALS
TEMPERATURE: 98.8 F | RESPIRATION RATE: 17 BRPM | HEART RATE: 101 BPM | WEIGHT: 245 LBS | OXYGEN SATURATION: 95 % | BODY MASS INDEX: 37.25 KG/M2 | SYSTOLIC BLOOD PRESSURE: 119 MMHG | DIASTOLIC BLOOD PRESSURE: 92 MMHG

## 2022-08-08 DIAGNOSIS — J18.9 COMMUNITY ACQUIRED PNEUMONIA OF RIGHT LOWER LOBE OF LUNG: Primary | ICD-10-CM

## 2022-08-08 LAB
ALBUMIN SERPL-MCNC: 3.9 G/DL (ref 3.5–5.2)
ALP BLD-CCNC: 100 U/L (ref 35–104)
ALT SERPL-CCNC: 13 U/L (ref 5–33)
ANION GAP SERPL CALCULATED.3IONS-SCNC: 12 MMOL/L (ref 7–19)
ANISOCYTOSIS: ABNORMAL
AST SERPL-CCNC: 11 U/L (ref 5–32)
BASOPHILS ABSOLUTE: 0.1 K/UL (ref 0–0.2)
BASOPHILS RELATIVE PERCENT: 0.9 % (ref 0–1)
BILIRUB SERPL-MCNC: 0.4 MG/DL (ref 0.2–1.2)
BUN BLDV-MCNC: 12 MG/DL (ref 6–20)
CALCIUM SERPL-MCNC: 9.2 MG/DL (ref 8.6–10)
CHLORIDE BLD-SCNC: 103 MMOL/L (ref 98–111)
CO2: 22 MMOL/L (ref 22–29)
CREAT SERPL-MCNC: 1 MG/DL (ref 0.5–0.9)
EKG P AXIS: 10 DEGREES
EKG P-R INTERVAL: 160 MS
EKG Q-T INTERVAL: 324 MS
EKG QRS DURATION: 86 MS
EKG QTC CALCULATION (BAZETT): 399 MS
EKG T AXIS: 24 DEGREES
EOSINOPHILS ABSOLUTE: 0.2 K/UL (ref 0–0.6)
EOSINOPHILS RELATIVE PERCENT: 2.3 % (ref 0–5)
GFR AFRICAN AMERICAN: >59
GFR NON-AFRICAN AMERICAN: 59
GLUCOSE BLD-MCNC: 148 MG/DL (ref 74–109)
HCT VFR BLD CALC: 30.6 % (ref 37–47)
HEMOGLOBIN: 8.8 G/DL (ref 12–16)
HYPOCHROMIA: ABNORMAL
IMMATURE GRANULOCYTES #: 0 K/UL
LYMPHOCYTES ABSOLUTE: 0.9 K/UL (ref 1.1–4.5)
LYMPHOCYTES RELATIVE PERCENT: 12.4 % (ref 20–40)
MCH RBC QN AUTO: 19.5 PG (ref 27–31)
MCHC RBC AUTO-ENTMCNC: 28.8 G/DL (ref 33–37)
MCV RBC AUTO: 67.7 FL (ref 81–99)
MICROCYTES: ABNORMAL
MONOCYTES ABSOLUTE: 0.4 K/UL (ref 0–0.9)
MONOCYTES RELATIVE PERCENT: 6 % (ref 0–10)
NEUTROPHILS ABSOLUTE: 5.3 K/UL (ref 1.5–7.5)
NEUTROPHILS RELATIVE PERCENT: 78 % (ref 50–65)
OVALOCYTES: ABNORMAL
PDW BLD-RTO: 19.9 % (ref 11.5–14.5)
PLATELET # BLD: 349 K/UL (ref 130–400)
PLATELET SLIDE REVIEW: ABNORMAL
PMV BLD AUTO: 9.6 FL (ref 9.4–12.3)
POTASSIUM SERPL-SCNC: 3.9 MMOL/L (ref 3.5–5)
RBC # BLD: 4.52 M/UL (ref 4.2–5.4)
SARS-COV-2, NAAT: NOT DETECTED
SODIUM BLD-SCNC: 137 MMOL/L (ref 136–145)
TOTAL PROTEIN: 7.3 G/DL (ref 6.6–8.7)
TROPONIN: <0.01 NG/ML (ref 0–0.03)
WBC # BLD: 6.9 K/UL (ref 4.8–10.8)

## 2022-08-08 PROCEDURE — 80053 COMPREHEN METABOLIC PANEL: CPT

## 2022-08-08 PROCEDURE — 36415 COLL VENOUS BLD VENIPUNCTURE: CPT

## 2022-08-08 PROCEDURE — 93005 ELECTROCARDIOGRAM TRACING: CPT | Performed by: EMERGENCY MEDICINE

## 2022-08-08 PROCEDURE — 96365 THER/PROPH/DIAG IV INF INIT: CPT

## 2022-08-08 PROCEDURE — 93010 ELECTROCARDIOGRAM REPORT: CPT | Performed by: INTERNAL MEDICINE

## 2022-08-08 PROCEDURE — 99285 EMERGENCY DEPT VISIT HI MDM: CPT

## 2022-08-08 PROCEDURE — 71045 X-RAY EXAM CHEST 1 VIEW: CPT | Performed by: RADIOLOGY

## 2022-08-08 PROCEDURE — 6360000002 HC RX W HCPCS: Performed by: PHYSICIAN ASSISTANT

## 2022-08-08 PROCEDURE — 85025 COMPLETE CBC W/AUTO DIFF WBC: CPT

## 2022-08-08 PROCEDURE — 84484 ASSAY OF TROPONIN QUANT: CPT

## 2022-08-08 PROCEDURE — 71045 X-RAY EXAM CHEST 1 VIEW: CPT

## 2022-08-08 PROCEDURE — 94640 AIRWAY INHALATION TREATMENT: CPT

## 2022-08-08 PROCEDURE — 87635 SARS-COV-2 COVID-19 AMP PRB: CPT

## 2022-08-08 PROCEDURE — 6370000000 HC RX 637 (ALT 250 FOR IP): Performed by: PHYSICIAN ASSISTANT

## 2022-08-08 PROCEDURE — 96375 TX/PRO/DX INJ NEW DRUG ADDON: CPT

## 2022-08-08 PROCEDURE — 2580000003 HC RX 258: Performed by: PHYSICIAN ASSISTANT

## 2022-08-08 RX ORDER — METHYLPREDNISOLONE SODIUM SUCCINATE 40 MG/ML
40 INJECTION, POWDER, LYOPHILIZED, FOR SOLUTION INTRAMUSCULAR; INTRAVENOUS ONCE
Status: COMPLETED | OUTPATIENT
Start: 2022-08-08 | End: 2022-08-08

## 2022-08-08 RX ORDER — DOXYCYCLINE HYCLATE 100 MG/1
100 CAPSULE ORAL 2 TIMES DAILY
Qty: 20 CAPSULE | Refills: 0 | Status: SHIPPED | OUTPATIENT
Start: 2022-08-08 | End: 2022-08-18

## 2022-08-08 RX ORDER — IPRATROPIUM BROMIDE AND ALBUTEROL SULFATE 2.5; .5 MG/3ML; MG/3ML
1 SOLUTION RESPIRATORY (INHALATION) ONCE
Status: COMPLETED | OUTPATIENT
Start: 2022-08-08 | End: 2022-08-08

## 2022-08-08 RX ORDER — PREDNISONE 10 MG/1
10 TABLET ORAL DAILY
Qty: 10 TABLET | Refills: 0 | Status: SHIPPED | OUTPATIENT
Start: 2022-08-08 | End: 2022-08-18

## 2022-08-08 RX ADMIN — AZITHROMYCIN MONOHYDRATE 500 MG: 500 INJECTION, POWDER, LYOPHILIZED, FOR SOLUTION INTRAVENOUS at 10:54

## 2022-08-08 RX ADMIN — IPRATROPIUM BROMIDE AND ALBUTEROL SULFATE 1 AMPULE: .5; 3 SOLUTION RESPIRATORY (INHALATION) at 11:36

## 2022-08-08 RX ADMIN — METHYLPREDNISOLONE SODIUM SUCCINATE 40 MG: 40 INJECTION, POWDER, FOR SOLUTION INTRAMUSCULAR; INTRAVENOUS at 10:53

## 2022-08-08 RX ADMIN — WATER 1000 MG: 1 INJECTION INTRAMUSCULAR; INTRAVENOUS; SUBCUTANEOUS at 10:53

## 2022-08-08 ASSESSMENT — ENCOUNTER SYMPTOMS
SORE THROAT: 0
PHOTOPHOBIA: 0
SHORTNESS OF BREATH: 1
APNEA: 0
RHINORRHEA: 0
WHEEZING: 1
COLOR CHANGE: 0
ABDOMINAL PAIN: 0
NAUSEA: 0
EYE DISCHARGE: 0
BACK PAIN: 0
COUGH: 1
EYE PAIN: 0
ABDOMINAL DISTENTION: 0
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
## 2022-08-09 ENCOUNTER — CARE COORDINATION (OUTPATIENT)
Dept: OTHER | Facility: CLINIC | Age: 47
End: 2022-08-09

## 2022-08-09 NOTE — CARE COORDINATION
Ambulatory Care Coordination Note  8/9/2022    ACC: Sylvain Asencio RN    Summary Note: Pt is monitoring her breathing and tracking her zones. She is in the yellow when up walking around the house, if she takes the dogs outside she gets into the red zone, but recovers at rest. She is taking it easy, denies fever today, she is doing instacart for her groceries. She is calling PCP today to make a follow up appt, her pcp manages her respiratory issues. Pt is taking the Vibratabs and prednisone , she is waiting for [facility] to fill the Albuterol for her nebulizer this morning . She has a productive cough of yellowish brown mucus she said. She is using her maintenance inhalers previously ordered. She has no concerns today or questions. Ambulatory Care Coordination Assessment    Care Coordination Protocol  Referral from Primary Care Provider: No  Week 1 - Initial Assessment     Do you have all of your prescriptions and are they filled?: No (Comment: Waiting on [facility] to fill the albuterol for her nebulizer)  Barriers to medication adherence: None  Are you able to afford your medications?: Yes  How often do you have trouble taking your medications the way you have been told to take them?: I always take them as prescribed. Do you have Home O2 Therapy?: No      Ability to seek help/take action for Emergent Urgent situations i.e. fire, crime, inclement weather or health crisis. : Independent  Ability to ambulate to restroom: Independent  Ability handle personal hygeine needs (bathing/dressing/grooming): Independent  Ability to manage Medications: Independent  Ability to prepare Food Preparation: Independent  Ability to maintain home (clean home, laundry): Independent  Ability to drive and/or has transportation: Independent  Ability to do shopping: Independent  Ability to manage finances:  Independent  Is patient able to live independently?: Yes     Current Housing: Private Residence                 Are the patients physical health problems impacting on their mental well-being?: No identified areas of concern   How would you rate their home environment in terms of safety and stability (including domestic violence, insecure housing, neighbor harassment)?: Consistently safe, supportive, stable, no identified problems   How would you rate their social network (family, work, friends)?: Good participation with social networks   How wells does the patient now understand their health and well-being (symptoms, signs or risk factors) and what they need to do to manage their health?: Reasonable to good understanding and already engages in managing health or is willing to undertake better management   How well do you think your patient can engage in healthcare discussions? (Barriers include language, deafness, aphasia, alcohol or drug problems, learning difficulties, concentration): Clear and open communication, no identified barriers   Do other services need to be involved to help this patient?: Other care/services in place and adequate   Suggested Interventions and Community Resources                    Prior to Admission medications    Medication Sig Start Date End Date Taking? Authorizing Provider   doxycycline hyclate (VIBRAMYCIN) 100 MG capsule Take 1 capsule by mouth in the morning and 1 capsule before bedtime. Do all this for 10 days. 8/8/22 8/18/22  FARZANA Schroeder   predniSONE (DELTASONE) 10 MG tablet Take 1 tablet by mouth in the morning for 10 days. . 8/8/22 8/18/22  FARZANA Schroeder   albuterol (PROVENTIL) (5 MG/ML) 0.5% nebulizer solution Take 0.5 mLs by nebulization every 6 hours as needed for Wheezing 8/8/22   FARZANA Schroeder   oxyCODONE-acetaminophen (PERCOCET) 5-325 MG per tablet Take 1 tablet by mouth every 8 hours as needed for Pain.     Historical Provider, MD   fluticasone-salmeterol (ADVAIR) 250-50 MCG/DOSE AEPB Inhale 1 puff into the lungs every 12 hours 10/20/21   MAINE Gil   fluticasone-salmeterol (ADVAIR HFA) 115-21 MCG/ACT inhaler     Historical Provider, MD   cetirizine (ZYRTEC) 10 MG tablet Take 10 mg by mouth daily    Historical Provider, MD   naproxen (NAPROSYN) 500 MG tablet Take 1 tablet by mouth 2 times daily (with meals)  Patient not taking: Reported on 10/20/2021 9/6/20   Bonnie Driver MD   levothyroxine (SYNTHROID) 175 MCG tablet Take 150 mcg by mouth daily 10/26/17   Historical Provider, MD   fluticasone-salmeterol (ADVAIR) 250-50 MCG/DOSE AEPB Inhale into the lungs  Patient not taking: Reported on 10/20/2021 7/10/17   Historical Provider, MD   acetaminophen (TYLENOL) 500 MG tablet Take 500 mg by mouth    Historical Provider, MD   Adalimumab (HUMIRA PEN) 40 MG/0.4ML PNKT  7/16/19   Historical Provider, MD   ALPRAZolam Rosaleen Kidney) 0.25 MG tablet Take 0.25 mg by mouth 2 times daily. 1/17/15   Historical Provider, MD   diclofenac sodium (VOLTAREN) 1 % GEL APPLY 2-4 GRAMS TO PAINFUL JOINT QID PRN.   MAX 32 GRAMS PER DAY 7/9/19   Historical Provider, MD   esomeprazole (NEXIUM) 40 MG delayed release capsule TK ONE C PO  BID 6/8/20   Historical Provider, MD   montelukast (SINGULAIR) 10 MG tablet TK 1 T PO QD IN THE [place]  Patient not taking: Reported on 6/12/2021 6/13/20   Historical Provider, MD   nystatin (MYCOSTATIN) 035143 UNIT/GM powder APPLY TO THE AFFECTED AREA(S) THREE TIMES A DAY  Patient not taking: Reported on 6/12/2021 4/18/19   Historical Provider, MD   promethazine (PHENERGAN) 25 MG tablet Take 25 mg by mouth every 6 hours as needed 4/5/19   Historical Provider, MD   topiramate (TOPAMAX) 25 MG tablet Take 75 mg by mouth nightly 6/19/20   Historical Provider, MD   rizatriptan (MAXALT) 5 MG tablet Take 5 mg by mouth once as needed 4/5/19 10/20/21  Historical Provider, MD   traZODone (DESYREL) 100 MG tablet TK 1 T PO QHS 3/20/18   Historical Provider, MD   triamcinolone (NASACORT) 55 MCG/ACT nasal inhaler INSTILL 2 SPRAYS IEN ONCE DAILY 2/3/16   Historical Provider, MD   venlafaxine (EFFEXOR XR)

## 2022-08-15 ENCOUNTER — CARE COORDINATION (OUTPATIENT)
Dept: OTHER | Facility: CLINIC | Age: 47
End: 2022-08-15

## 2022-08-15 ENCOUNTER — HOSPITAL ENCOUNTER (OUTPATIENT)
Dept: PHYSICAL THERAPY | Age: 47
Setting detail: THERAPIES SERIES
Discharge: HOME | End: 2022-08-15
Payer: COMMERCIAL

## 2022-08-15 PROCEDURE — 97110 THERAPEUTIC EXERCISES: CPT

## 2022-08-15 ASSESSMENT — PAIN DESCRIPTION - FREQUENCY: FREQUENCY: INTERMITTENT

## 2022-08-15 ASSESSMENT — PAIN DESCRIPTION - LOCATION: LOCATION: KNEE

## 2022-08-15 ASSESSMENT — PAIN DESCRIPTION - ORIENTATION: ORIENTATION: LEFT

## 2022-08-15 ASSESSMENT — PAIN - FUNCTIONAL ASSESSMENT: PAIN_FUNCTIONAL_ASSESSMENT: PREVENTS OR INTERFERES SOME ACTIVE ACTIVITIES AND ADLS

## 2022-08-15 ASSESSMENT — PAIN SCALES - GENERAL: PAINLEVEL_OUTOF10: 1
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
## 2022-08-15 NOTE — CARE COORDINATION
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 Pt gets repeat labs this week for hgb check. She denies fever, states her cough is now less productive than before. She generally works from home she said. She is in the green zone at rest sometimes will move into the yellow zone briefly with a lot of activity. She said she saw her pcp last Thursday. She is continuing on the Vibratabs and prednisone. She is drinking lots of fluids, is using her yeti cup. She does sound hoarse during our call Said she woke up this morning and could hear the whistling so she took a neb tx and it resolved. She is going to ask her pcp about taking mucinex Will follow         Care Coordination Interventions    Referral from Primary Care Provider: No  Suggested Interventions and Community Resources          Goals Addressed                   This Visit's Progress     Conditions and Symptoms   On track     I will schedule office visits, as directed by my provider. I will keep my appointment or reschedule if I have to cancel. I will notify my provider of any barriers to my plan of care. I will follow my Zone Management tool to seek urgent or emergent care. I will notify my provider of any symptoms that indicate a worsening of my condition. Barriers: overwhelmed by complexity of regimen and stress  Plan for overcoming my barriers: work with [facility], PCP for education, support and resources as needed  Confidence: 9/10  Anticipated Goal Completion Date: 8/8/22 and ongoing                Prior to Admission medications    Medication Sig Start Date End Date Taking? Authorizing Provider   doxycycline hyclate (VIBRAMYCIN) 100 MG capsule Take 1 capsule by mouth in the morning and 1 capsule before bedtime. Do all this for 10 days.  8/8/22 8/18/22  FARZANA Ramirez   predniSONE (DELTASONE) 10 MG tablet Take 1 tablet by mouth in the by mouth every 6 hours as needed 4/5/19   Historical Provider, MD   topiramate (TOPAMAX) 25 MG tablet Take 75 mg by mouth nightly 6/19/20   Historical Provider, MD   rizatriptan (MAXALT) 5 MG tablet Take 5 mg by mouth once as needed 4/5/19 10/20/21  Historical Provider, MD   traZODone (DESYREL) 100 MG tablet TK 1 T PO QHS 3/20/18   Historical Provider, MD   triamcinolone (NASACORT) 55 MCG/ACT nasal inhaler INSTILL 2 SPRAYS IEN ONCE DAILY 2/3/16   Historical Provider, MD   venlafaxine (EFFEXOR XR) 150 MG extended release capsule Take 1 capsule by mouth daily 10/30/15   Historical Provider, MD   lamoTRIgine (LAMICTAL) 100 MG tablet TK 1 T PO D WITH DINNER 3/14/17   Historical Provider, MD       Future Appointments   Date Time Provider Madelaine Pena   8/15/2022 11:00 AM Shiela Donis, PT [facility]   8/17/2022  4:00 PM Sara Mendosa PTA [facility]   8/23/2022  1:00 PM Michael Villatoro PTA [facility]    Duke Rhodes BSN, RN- [facility]  Associate Care Manager  [phone]

## 2022-08-17 ENCOUNTER — APPOINTMENT (OUTPATIENT)
Dept: PHYSICAL THERAPY | Age: 47
End: 2022-08-17
Payer: COMMERCIAL

## 2022-08-22 ENCOUNTER — CARE COORDINATION (OUTPATIENT)
Dept: OTHER | Facility: CLINIC | Age: 47
End: 2022-08-22

## 2022-08-22 NOTE — CARE COORDINATION
Ambulatory Care Coordination Note  8/22/2022    ACC: Sherlyn Ly, RN    Summary Note: Pt is doing better as far as her cough and breathing, she has completed her vibratab and prednisone. Her Hbg and Hct are low and her pcp sanjana a repeat on Friday, pt said she should get the results tomorrow or next day. Hgb is 8.8 and HCT 30.6. down from last labs. Pt said she is able to do housework, but just has to break it up throughout the day and then she can manage fine, She has her nebulizer to use prn if needed. She denies fever. She does get short of breath with too much activity. She said if the repeat labs come back low again they may refer her to a hematologist, She is having and EGD on 9/23/22 by Dr Gris Arana. Will continue to follow patient. Care Coordination Interventions    Referral from Primary Care Provider: No  Suggested Interventions and Community Resources  Disease Specific Clinic: Completed (Comment: Dr Gris Arana ( GI))          Goals Addressed                   This Visit's Progress     Conditions and Symptoms   On track     I will schedule office visits, as directed by my provider. I will keep my appointment or reschedule if I have to cancel. I will notify my provider of any barriers to my plan of care. I will follow my Zone Management tool to seek urgent or emergent care. I will notify my provider of any symptoms that indicate a worsening of my condition. Barriers: overwhelmed by complexity of regimen and stress  Plan for overcoming my barriers: work with [employer], PCP for education, support and resources as needed  Confidence: 9/10  Anticipated Goal Completion Date: 8/8/22 and ongoing                Prior to Admission medications    Medication Sig Start Date End Date Taking?  Authorizing Provider   albuterol (PROVENTIL) (5 MG/ML) 0.5% nebulizer solution Take 0.5 mLs by nebulization every 6 hours as needed for Wheezing 8/8/22   FARZANA Chadwick   oxyCODONE-acetaminophen (PERCOCET) 5-325 MG per tablet Take 1 tablet by mouth every 8 hours as needed for Pain. Historical Provider, MD   fluticasone-salmeterol (ADVAIR) 250-50 MCG/DOSE AEPB Inhale 1 puff into the lungs every 12 hours 10/20/21   Dejah VESTA Jacksonunique APRTESSA   fluticasone-salmeterol (ADVAIR HFA) 115-21 MCG/ACT inhaler     Historical Provider, MD   cetirizine (ZYRTEC) 10 MG tablet Take 10 mg by mouth daily    Historical Provider, MD   naproxen (NAPROSYN) 500 MG tablet Take 1 tablet by mouth 2 times daily (with meals)  Patient not taking: Reported on 10/20/2021 9/6/20   Michi Pascual MD   levothyroxine (SYNTHROID) 175 MCG tablet Take 150 mcg by mouth daily 10/26/17   Historical Provider, MD   fluticasone-salmeterol (ADVAIR) 250-50 MCG/DOSE AEPB Inhale into the lungs  Patient not taking: Reported on 10/20/2021 7/10/17   Historical Provider, MD   acetaminophen (TYLENOL) 500 MG tablet Take 500 mg by mouth    Historical Provider, MD   Adalimumab (HUMIRA PEN) 40 MG/0.4ML PNKT  7/16/19   Historical Provider, MD   ALPRAZolam [facility]) 0.25 MG tablet Take 0.25 mg by mouth 2 times daily. 1/17/15   Historical Provider, MD   diclofenac sodium (VOLTAREN) 1 % GEL APPLY 2-4 GRAMS TO PAINFUL JOINT QID PRN.   MAX 32 GRAMS PER DAY 7/9/19   Historical Provider, MD   esomeprazole (NEXIUM) 40 MG delayed release capsule TK ONE C PO  BID 6/8/20   Historical Provider, MD   montelukast (SINGULAIR) 10 MG tablet TK 1 T PO QD IN THE KATIUSKA  Patient not taking: Reported on 6/12/2021 6/13/20   Historical Provider, MD   nystatin (MYCOSTATIN) 963031 UNIT/GM powder APPLY TO THE AFFECTED AREA(S) THREE TIMES A DAY  Patient not taking: Reported on 6/12/2021 4/18/19   Historical Provider, MD   promethazine (PHENERGAN) 25 MG tablet Take 25 mg by mouth every 6 hours as needed 4/5/19   Historical Provider, MD   topiramate (TOPAMAX) 25 MG tablet Take 75 mg by mouth nightly 6/19/20   Historical Provider, MD zapatazatriptan (MAXALT) 5 MG tablet Take 5 mg by mouth once as needed 4/5/19 10/20/21  Historical Provider, MD   traZODone (DESYREL) 100 MG tablet TK 1 T PO QHS 3/20/18   Historical Provider, MD   triamcinolone (NASACORT) 55 MCG/ACT nasal inhaler INSTILL 2 SPRAYS IEN ONCE DAILY 2/3/16   Historical Provider, MD   venlafaxine (EFFEXOR XR) 150 MG extended release capsule Take 1 capsule by mouth daily 10/30/15   Historical Provider, MD   lamoTRIgine (LAMICTAL) 100 MG tablet TK 1 T PO D WITH DINNER 3/14/17   Historical Provider, MD       No future appointments.

## 2022-08-23 ENCOUNTER — APPOINTMENT (OUTPATIENT)
Dept: PHYSICAL THERAPY | Age: 47
End: 2022-08-23
Payer: COMMERCIAL

## 2022-08-26 ENCOUNTER — CARE COORDINATION (OUTPATIENT)
Dept: OTHER | Facility: CLINIC | Age: 47
End: 2022-08-26

## 2022-08-26 SDOH — ECONOMIC STABILITY: HOUSING INSECURITY: IN THE LAST 12 MONTHS, HOW MANY PLACES HAVE YOU LIVED?: 1

## 2022-08-26 SDOH — ECONOMIC STABILITY: FOOD INSECURITY: WITHIN THE PAST 12 MONTHS, THE FOOD YOU BOUGHT JUST DIDN'T LAST AND YOU DIDN'T HAVE MONEY TO GET MORE.: NEVER TRUE

## 2022-08-26 SDOH — ECONOMIC STABILITY: INCOME INSECURITY: IN THE LAST 12 MONTHS, WAS THERE A TIME WHEN YOU WERE NOT ABLE TO PAY THE MORTGAGE OR RENT ON TIME?: NO

## 2022-08-26 SDOH — ECONOMIC STABILITY: TRANSPORTATION INSECURITY
IN THE PAST 12 MONTHS, HAS LACK OF TRANSPORTATION KEPT YOU FROM MEETINGS, WORK, OR FROM GETTING THINGS NEEDED FOR DAILY LIVING?: NO

## 2022-08-26 SDOH — ECONOMIC STABILITY: HOUSING INSECURITY
IN THE LAST 12 MONTHS, WAS THERE A TIME WHEN YOU DID NOT HAVE A STEADY PLACE TO SLEEP OR SLEPT IN A SHELTER (INCLUDING NOW)?: NO

## 2022-08-26 SDOH — ECONOMIC STABILITY: TRANSPORTATION INSECURITY
IN THE PAST 12 MONTHS, HAS THE LACK OF TRANSPORTATION KEPT YOU FROM MEDICAL APPOINTMENTS OR FROM GETTING MEDICATIONS?: NO

## 2022-08-26 SDOH — ECONOMIC STABILITY: FOOD INSECURITY: WITHIN THE PAST 12 MONTHS, YOU WORRIED THAT YOUR FOOD WOULD RUN OUT BEFORE YOU GOT MONEY TO BUY MORE.: NEVER TRUE

## 2022-08-26 NOTE — CARE COORDINATION
daily    Historical Provider, MD   naproxen (NAPROSYN) 500 MG tablet Take 1 tablet by mouth 2 times daily (with meals)  Patient not taking: Reported on 10/20/2021 9/6/20   Fernanda Pak MD   levothyroxine (SYNTHROID) 175 MCG tablet Take 150 mcg by mouth daily 10/26/17   Historical Provider, MD   fluticasone-salmeterol (ADVAIR) 250-50 MCG/DOSE AEPB Inhale into the lungs  Patient not taking: Reported on 10/20/2021 7/10/17   Historical Provider, MD   acetaminophen (TYLENOL) 500 MG tablet Take 500 mg by mouth    Historical Provider, MD   Adalimumab (HUMIRA PEN) 40 MG/0.4ML PNKT  7/16/19   Historical Provider, MD   ALPRAZolam Robbin Patience) 0.25 MG tablet Take 0.25 mg by mouth 2 times daily. 1/17/15   Historical Provider, MD   diclofenac sodium (VOLTAREN) 1 % GEL APPLY 2-4 GRAMS TO PAINFUL JOINT QID PRN.   MAX 32 GRAMS PER DAY 7/9/19   Historical Provider, MD   esomeprazole (NEXIUM) 40 MG delayed release capsule TK ONE C PO  BID 6/8/20   Historical Provider, MD   montelukast (SINGULAIR) 10 MG tablet TK 1 T PO QD IN THE KATIUSKA  Patient not taking: Reported on 6/12/2021 6/13/20   Historical Provider, MD   nystatin (MYCOSTATIN) 845119 UNIT/GM powder APPLY TO THE AFFECTED AREA(S) THREE TIMES A DAY  Patient not taking: Reported on 6/12/2021 4/18/19   Historical Provider, MD   promethazine (PHENERGAN) 25 MG tablet Take 25 mg by mouth every 6 hours as needed 4/5/19   Historical Provider, MD   topiramate (TOPAMAX) 25 MG tablet Take 75 mg by mouth nightly 6/19/20   Historical Provider, MD   rizatriptan (MAXALT) 5 MG tablet Take 5 mg by mouth once as needed 4/5/19 10/20/21  Historical Provider, MD   traZODone (DESYREL) 100 MG tablet TK 1 T PO QHS 3/20/18   Historical Provider, MD   triamcinolone (NASACORT) 55 MCG/ACT nasal inhaler INSTILL 2 SPRAYS IEN ONCE DAILY 2/3/16   Historical Provider, MD   venlafaxine (EFFEXOR XR) 150 MG extended release capsule Take 1 capsule by mouth daily 10/30/15   Historical Provider, MD   lamoTRIgine (LAMICTAL) 100 MG tablet TK 1 T PO D WITH DINNER 3/14/17   Historical Provider, MD       No future appointments.

## 2022-09-02 ENCOUNTER — CARE COORDINATION (OUTPATIENT)
Dept: OTHER | Facility: CLINIC | Age: 47
End: 2022-09-02

## 2022-09-02 NOTE — CARE COORDINATION
Ambulatory Care Coordination Note  9/2/2022    ACC: Mario Frazier RN    Summary Note: ACM attempted to reach patient for follow up call regarding respiratory status, upcoming scope this month . HIPAA compliant message left requesting a return phone call at patients convenience. Will continue to follow. Care Coordination Interventions    Referral from Primary Care Provider: No  Suggested Interventions and Community Resources  Disease Specific Clinic: Completed (Comment: Dr Fercho Delacruz ( GI))           Prior to Admission medications    Medication Sig Start Date End Date Taking? Authorizing Provider   albuterol (PROVENTIL) (5 MG/ML) 0.5% nebulizer solution Take 0.5 mLs by nebulization every 6 hours as needed for Wheezing 8/8/22   FARZANA Munguia   oxyCODONE-acetaminophen (PERCOCET) 5-325 MG per tablet Take 1 tablet by mouth every 8 hours as needed for Pain. Historical Provider, MD   fluticasone-salmeterol (ADVAIR) 250-50 MCG/DOSE AEPB Inhale 1 puff into the lungs every 12 hours 10/20/21   MAINE Ardon   fluticasone-salmeterol (ADVAIR HFA) 115-21 MCG/ACT inhaler     Historical Provider, MD   cetirizine (ZYRTEC) 10 MG tablet Take 10 mg by mouth daily    Historical Provider, MD   naproxen (NAPROSYN) 500 MG tablet Take 1 tablet by mouth 2 times daily (with meals)  Patient not taking: Reported on 10/20/2021 9/6/20   Johan Pace MD   levothyroxine (SYNTHROID) 175 MCG tablet Take 150 mcg by mouth daily 10/26/17   Historical Provider, MD   fluticasone-salmeterol (ADVAIR) 250-50 MCG/DOSE AEPB Inhale into the lungs  Patient not taking: Reported on 10/20/2021 7/10/17   Historical Provider, MD   acetaminophen (TYLENOL) 500 MG tablet Take 500 mg by mouth    Historical Provider, MD   Adalimumab (HUMIRA PEN) 40 MG/0.4ML PNKT  7/16/19   Historical Provider, MD   ALPRAZolam Otelia Adjutant) 0.25 MG tablet Take 0.25 mg by mouth 2 times daily.  1/17/15   Historical Provider, MD   diclofenac sodium (VOLTAREN) 1 % GEL APPLY 2-4 GRAMS TO PAINFUL JOINT QID PRN. MAX 32 GRAMS PER DAY 7/9/19   Historical Provider, MD   esomeprazole (NEXIUM) 40 MG delayed release capsule TK ONE C PO  BID 6/8/20   Historical Provider, MD   montelukast (SINGULAIR) 10 MG tablet TK 1 T PO QD IN THE KATIUSKA  Patient not taking: Reported on 6/12/2021 6/13/20   Historical Provider, MD   nystatin (MYCOSTATIN) [id number] UNIT/GM powder APPLY TO THE AFFECTED AREA(S) THREE TIMES A DAY  Patient not taking: Reported on 6/12/2021 4/18/19   Historical Provider, MD   promethazine (PHENERGAN) 25 MG tablet Take 25 mg by mouth every 6 hours as needed 4/5/19   Historical Provider, MD   topiramate (TOPAMAX) 25 MG tablet Take 75 mg by mouth nightly 6/19/20   Historical Provider, MD   rizatriptan (MAXALT) 5 MG tablet Take 5 mg by mouth once as needed 4/5/19 10/20/21  Historical Provider, MD   traZODone (DESYREL) 100 MG tablet TK 1 T PO QHS 3/20/18   Historical Provider, MD   triamcinolone (NASACORT) 55 MCG/ACT nasal inhaler INSTILL 2 SPRAYS IEN ONCE DAILY 2/3/16   Historical Provider, MD   venlafaxine (EFFEXOR XR) 150 MG extended release capsule Take 1 capsule by mouth daily 10/30/15   Historical Provider, MD   lamoTRIgine (LAMICTAL) 100 MG tablet TK 1 T PO D WITH DINNER 3/14/17   Historical Provider, MD       No future appointments.

## 2022-09-09 ENCOUNTER — CARE COORDINATION (OUTPATIENT)
Dept: OTHER | Facility: CLINIC | Age: 47
End: 2022-09-09

## 2022-09-09 ASSESSMENT — SOCIAL DETERMINANTS OF HEALTH (SDOH): HOW HARD IS IT FOR YOU TO PAY FOR THE VERY BASICS LIKE FOOD, HOUSING, MEDICAL CARE, AND HEATING?: NOT HARD AT ALL

## 2022-09-09 NOTE — CARE COORDINATION
Ambulatory Care Coordination Note  9/9/2022    ACC: Queen Taya RN    Summary Note: Pt states she is doing fairly well today. She has an EGD scheduled at [facility] on 9/23/22, she has a hx of Joya's Esophagus. She is also having a colonoscopy due to strong family history of colon cancer. She has cut out the Vit B 12 supplements as we discussed on our previous call, due to her level being high. Pt has no concerns today or new symptoms. Will follow up post EGD/Colonoscopy     Care Coordination Interventions    Referral from Primary Care Provider: No  Suggested Interventions and Community Resources  Disease Specific Clinic: Completed (Comment: Dr Mia Luke ( GI) with [facility], Dr Arce Lindons [facility], Neurology)          Goals Addressed                   This Visit's Progress     Conditions and Symptoms   On track     I will schedule office visits, as directed by my provider. I will keep my appointment or reschedule if I have to cancel. I will notify my provider of any barriers to my plan of care. I will follow my Zone Management tool to seek urgent or emergent care. I will notify my provider of any symptoms that indicate a worsening of my condition. Barriers: overwhelmed by complexity of regimen and stress  Plan for overcoming my barriers: work with [facility], PCP for education, support and resources as needed  Confidence: 9/10  Anticipated Goal Completion Date: 8/8/22 and ongoing                Prior to Admission medications    Medication Sig Start Date End Date Taking? Authorizing Provider   albuterol (PROVENTIL) (5 MG/ML) 0.5% nebulizer solution Take 0.5 mLs by nebulization every 6 hours as needed for Wheezing 8/8/22   FARZANA Caldera   oxyCODONE-acetaminophen (PERCOCET) 5-325 MG per tablet Take 1 tablet by mouth every 8 hours as needed for Pain.     Historical Provider, MD   fluticasone-salmeterol (ADVAIR) 250-50 MCG/DOSE AEPB Inhale 1 puff into the lungs every 12 hours 10/20/21   Patsy LEMONS Shan, APRN   fluticasone-salmeterol (ADVAIR HFA) 115-21 MCG/ACT inhaler     Historical Provider, MD   cetirizine (ZYRTEC) 10 MG tablet Take 10 mg by mouth daily    Historical Provider, MD   naproxen (NAPROSYN) 500 MG tablet Take 1 tablet by mouth 2 times daily (with meals)  Patient not taking: Reported on 10/20/2021 9/6/20   Noel Hernandez MD   levothyroxine (SYNTHROID) 175 MCG tablet Take 150 mcg by mouth daily 10/26/17   Historical Provider, MD   fluticasone-salmeterol (ADVAIR) 250-50 MCG/DOSE AEPB Inhale into the lungs  Patient not taking: Reported on 10/20/2021 7/10/17   Historical Provider, MD   acetaminophen (TYLENOL) 500 MG tablet Take 500 mg by mouth    Historical Provider, MD   Adalimumab (HUMIRA PEN) 40 MG/0.4ML PNKT  7/16/19   Historical Provider, MD   ALPRAZolam Veronica Correa) 0.25 MG tablet Take 0.25 mg by mouth 2 times daily. 1/17/15   Historical Provider, MD   diclofenac sodium (VOLTAREN) 1 % GEL APPLY 2-4 GRAMS TO PAINFUL JOINT QID PRN.   MAX 32 GRAMS PER DAY 7/9/19   Historical Provider, MD   esomeprazole (NEXIUM) 40 MG delayed release capsule TK ONE C PO  BID 6/8/20   Historical Provider, MD   montelukast (SINGULAIR) 10 MG tablet TK 1 T PO QD IN THE KATIUSKA  Patient not taking: Reported on 6/12/2021 6/13/20   Historical Provider, MD   nystatin (MYCOSTATIN) 320348 UNIT/GM powder APPLY TO THE AFFECTED AREA(S) THREE TIMES A DAY  Patient not taking: Reported on 6/12/2021 4/18/19   Historical Provider, MD   promethazine (PHENERGAN) 25 MG tablet Take 25 mg by mouth every 6 hours as needed 4/5/19   Historical Provider, MD   topiramate (TOPAMAX) 25 MG tablet Take 75 mg by mouth nightly 6/19/20   Historical Provider, MD   rizatriptan (MAXALT) 5 MG tablet Take 5 mg by mouth once as needed 4/5/19 10/20/21  Historical Provider, MD   traZODone (DESYREL) 100 MG tablet TK 1 T PO QHS 3/20/18   Historical Provider, MD   triamcinolone (NASACORT) 55 MCG/ACT nasal inhaler INSTILL 2 SPRAYS IEN ONCE DAILY 2/3/16   Historical Provider, MD   venlafaxine (EFFEXOR XR) 150 MG extended release capsule Take 1 capsule by mouth daily 10/30/15   Historical Provider, MD   lamoTRIgine (LAMICTAL) 100 MG tablet TK 1 T PO D WITH DINNER 3/14/17   Historical Provider, MD       No future appointments.

## 2022-09-23 ENCOUNTER — ANESTHESIA (OUTPATIENT)
Dept: GASTROENTEROLOGY | Facility: HOSPITAL | Age: 47
End: 2022-09-23

## 2022-09-23 ENCOUNTER — ON CAMPUS - OUTPATIENT (OUTPATIENT)
Dept: URBAN - METROPOLITAN AREA HOSPITAL 114 | Facility: HOSPITAL | Age: 47
End: 2022-09-23
Payer: COMMERCIAL

## 2022-09-23 ENCOUNTER — ANESTHESIA EVENT (OUTPATIENT)
Dept: GASTROENTEROLOGY | Facility: HOSPITAL | Age: 47
End: 2022-09-23

## 2022-09-23 ENCOUNTER — HOSPITAL ENCOUNTER (OUTPATIENT)
Facility: HOSPITAL | Age: 47
Setting detail: HOSPITAL OUTPATIENT SURGERY
Discharge: HOME | End: 2022-09-23
Attending: INTERNAL MEDICINE | Admitting: INTERNAL MEDICINE

## 2022-09-23 VITALS
SYSTOLIC BLOOD PRESSURE: 115 MMHG | RESPIRATION RATE: 17 BRPM | HEART RATE: 88 BPM | TEMPERATURE: 98.5 F | OXYGEN SATURATION: 97 % | BODY MASS INDEX: 36.83 KG/M2 | HEIGHT: 68 IN | WEIGHT: 243 LBS | DIASTOLIC BLOOD PRESSURE: 84 MMHG

## 2022-09-23 DIAGNOSIS — K29.70 GASTRITIS, UNSPECIFIED, WITHOUT BLEEDING: ICD-10-CM

## 2022-09-23 DIAGNOSIS — K21.9 GERD (GASTROESOPHAGEAL REFLUX DISEASE): ICD-10-CM

## 2022-09-23 DIAGNOSIS — K44.9 DIAPHRAGMATIC HERNIA WITHOUT OBSTRUCTION OR GANGRENE: ICD-10-CM

## 2022-09-23 DIAGNOSIS — K64.2 THIRD DEGREE HEMORRHOIDS: ICD-10-CM

## 2022-09-23 DIAGNOSIS — K57.30 DIVERTICULOSIS OF LARGE INTESTINE WITHOUT PERFORATION OR ABS: ICD-10-CM

## 2022-09-23 DIAGNOSIS — D50.9 IRON DEFICIENCY ANEMIA, UNSPECIFIED: ICD-10-CM

## 2022-09-23 DIAGNOSIS — Z12.11 ENCOUNTER FOR SCREENING FOR MALIGNANT NEOPLASM OF COLON: ICD-10-CM

## 2022-09-23 DIAGNOSIS — Z12.11 SCREENING FOR COLON CANCER: ICD-10-CM

## 2022-09-23 DIAGNOSIS — D12.5 BENIGN NEOPLASM OF SIGMOID COLON: ICD-10-CM

## 2022-09-23 DIAGNOSIS — D12.3 BENIGN NEOPLASM OF TRANSVERSE COLON: ICD-10-CM

## 2022-09-23 PROCEDURE — 0 LIDOCAINE 1 % SOLUTION: Performed by: REGISTERED NURSE

## 2022-09-23 PROCEDURE — 25010000002 ONDANSETRON PER 1 MG: Performed by: REGISTERED NURSE

## 2022-09-23 PROCEDURE — 25010000002 PROPOFOL 10 MG/ML EMULSION: Performed by: REGISTERED NURSE

## 2022-09-23 PROCEDURE — 45380 COLONOSCOPY AND BIOPSY: CPT | Mod: 33,59 | Performed by: INTERNAL MEDICINE

## 2022-09-23 PROCEDURE — 45385 COLONOSCOPY W/LESION REMOVAL: CPT | Mod: 33 | Performed by: INTERNAL MEDICINE

## 2022-09-23 PROCEDURE — 43239 EGD BIOPSY SINGLE/MULTIPLE: CPT | Performed by: INTERNAL MEDICINE

## 2022-09-23 PROCEDURE — 88305 TISSUE EXAM BY PATHOLOGIST: CPT | Performed by: INTERNAL MEDICINE

## 2022-09-23 DEVICE — DEV CLIP ENDO RESOLUTION360 CONTRL ROT 235CM: Type: IMPLANTABLE DEVICE | Site: TRANSVERSE COLON | Status: FUNCTIONAL

## 2022-09-23 RX ORDER — ONDANSETRON 2 MG/ML
4 INJECTION INTRAMUSCULAR; INTRAVENOUS ONCE AS NEEDED
Status: COMPLETED | OUTPATIENT
Start: 2022-09-23 | End: 2022-09-23

## 2022-09-23 RX ORDER — PROPOFOL 10 MG/ML
VIAL (ML) INTRAVENOUS AS NEEDED
Status: DISCONTINUED | OUTPATIENT
Start: 2022-09-23 | End: 2022-09-23 | Stop reason: SURG

## 2022-09-23 RX ORDER — SODIUM CHLORIDE 0.9 % (FLUSH) 0.9 %
10 SYRINGE (ML) INJECTION EVERY 12 HOURS SCHEDULED
Status: DISCONTINUED | OUTPATIENT
Start: 2022-09-23 | End: 2022-10-11 | Stop reason: HOSPADM

## 2022-09-23 RX ORDER — SODIUM CHLORIDE, SODIUM LACTATE, POTASSIUM CHLORIDE, CALCIUM CHLORIDE 600; 310; 30; 20 MG/100ML; MG/100ML; MG/100ML; MG/100ML
30 INJECTION, SOLUTION INTRAVENOUS CONTINUOUS PRN
Status: DISCONTINUED | OUTPATIENT
Start: 2022-09-23 | End: 2022-10-11 | Stop reason: HOSPADM

## 2022-09-23 RX ORDER — LIDOCAINE HYDROCHLORIDE 10 MG/ML
INJECTION, SOLUTION INFILTRATION; PERINEURAL AS NEEDED
Status: DISCONTINUED | OUTPATIENT
Start: 2022-09-23 | End: 2022-09-23 | Stop reason: SURG

## 2022-09-23 RX ORDER — SODIUM CHLORIDE 0.9 % (FLUSH) 0.9 %
10 SYRINGE (ML) INJECTION AS NEEDED
Status: DISCONTINUED | OUTPATIENT
Start: 2022-09-23 | End: 2022-10-11 | Stop reason: HOSPADM

## 2022-09-23 RX ADMIN — SODIUM CHLORIDE, POTASSIUM CHLORIDE, SODIUM LACTATE AND CALCIUM CHLORIDE: 600; 310; 30; 20 INJECTION, SOLUTION INTRAVENOUS at 10:08

## 2022-09-23 RX ADMIN — PROPOFOL 140 MCG/KG/MIN: 10 INJECTION, EMULSION INTRAVENOUS at 10:14

## 2022-09-23 RX ADMIN — LIDOCAINE HYDROCHLORIDE 30 MG: 10 INJECTION, SOLUTION INFILTRATION; PERINEURAL at 10:13

## 2022-09-23 RX ADMIN — PROPOFOL 100 MG: 10 INJECTION, EMULSION INTRAVENOUS at 10:13

## 2022-09-23 RX ADMIN — ONDANSETRON 4 MG: 2 INJECTION INTRAMUSCULAR; INTRAVENOUS at 11:43

## 2022-09-23 NOTE — ANESTHESIA POSTPROCEDURE EVALUATION
"Patient: Altagracia Tiwari    Procedure Summary     Date: 09/23/22 Room / Location:  [facility] /  [facility]    Anesthesia Start: 1009 Anesthesia Stop: 1116    Procedures:       ESOPHAGOGASTRODUODENOSCOPY WITH BX (N/A Esophagus)      COLONOSCOPY INTO CECUM WITH POLYPECTOMY (COLD) SNARE, and X3 RESOLUTION CLIPS @ TRANSVERSE COLON and x1 CLIP TO SIGMOID COLON (N/A ) Diagnosis:     Surgeons: Raymond Wells MD Provider: Heidi Vogt MD    Anesthesia Type: MAC ASA Status: 3          Anesthesia Type: MAC    Vitals  Vitals Value Taken Time   /84 09/23/22 1135   Temp     Pulse 88 09/23/22 1135   Resp 17 09/23/22 1135   SpO2 97 % 09/23/22 1135           Post Anesthesia Care and Evaluation    Patient location during evaluation: PHASE II  Patient participation: complete - patient participated  Level of consciousness: awake  Pain management: adequate    Airway patency: patent  Anesthetic complications: No anesthetic complications    Cardiovascular status: acceptable  Respiratory status: acceptable  Hydration status: acceptable    Comments: /84 (BP Location: Left arm, Patient Position: Sitting)   Pulse 88   Temp 36.9 °C (98.5 °F) (Oral)   Resp 17   Ht 172.7 cm (68\")   Wt 110 kg (243 lb)   SpO2 97%   BMI 36.95 kg/m²       "

## 2022-09-23 NOTE — ANESTHESIA PREPROCEDURE EVALUATION
Anesthesia Evaluation     Patient summary reviewed and Nursing notes reviewed                Airway   Mallampati: III  Dental - normal exam     Pulmonary - normal exam   (+) asthma,    ROS comment: Patient has remote history of tracheostomy  Cardiovascular     ECG reviewed    (+) valvular problems/murmurs murmur, murmur, hyperlipidemia,       Neuro/Psych  (+) headaches, tremors, psychiatric history Anxiety and Depression,      ROS Comment: Patient has deep brain stimulator  GI/Hepatic/Renal/Endo    (+) morbid obesity, GERD,  thyroid problem hypothyroidism    Musculoskeletal     Abdominal    Substance History      OB/GYN          Other                        Anesthesia Plan    ASA 3     MAC       Anesthetic plan, risks, benefits, and alternatives have been provided, discussed and informed consent has been obtained with: patient.        CODE STATUS:

## 2022-09-26 ENCOUNTER — CARE COORDINATION (OUTPATIENT)
Dept: OTHER | Facility: CLINIC | Age: 47
End: 2022-09-26

## 2022-09-26 LAB
LAB AP CASE REPORT: NORMAL
PATH REPORT.FINAL DX SPEC: NORMAL
PATH REPORT.GROSS SPEC: NORMAL

## 2022-09-26 NOTE — CARE COORDINATION
Ambulatory Care Coordination Note  9/26/2022    ACC: Ayo Thomas, ARTI    Summary Note: ACM attempted to reach patient for follow up call regarding EGD done on Friday at [facility] . HIPAA compliant message left requesting a return phone call at patients convenience. Will continue to follow. Care Coordination Interventions    Referral from Primary Care Provider: No  Suggested Interventions and Community Resources  Disease Specific Clinic: Completed (Comment: Dr Norma Medley ( GI) with [facility], Dr Alba Gonzalez [facility], Neurology)             Prior to Admission medications    Medication Sig Start Date End Date Taking? Authorizing Provider   albuterol (PROVENTIL) (5 MG/ML) 0.5% nebulizer solution Take 0.5 mLs by nebulization every 6 hours as needed for Wheezing 8/8/22   FRAZANA Cutler   oxyCODONE-acetaminophen (PERCOCET) 5-325 MG per tablet Take 1 tablet by mouth every 8 hours as needed for Pain. Historical Provider, MD   fluticasone-salmeterol (ADVAIR) 250-50 MCG/DOSE AEPB Inhale 1 puff into the lungs every 12 hours 10/20/21   MAINE Ardon   fluticasone-salmeterol (ADVAIR HFA) 115-21 MCG/ACT inhaler     Historical Provider, MD   cetirizine (ZYRTEC) 10 MG tablet Take 10 mg by mouth daily    Historical Provider, MD   naproxen (NAPROSYN) 500 MG tablet Take 1 tablet by mouth 2 times daily (with meals)  Patient not taking: Reported on 10/20/2021 9/6/20   Marie Camargo MD   levothyroxine (SYNTHROID) 175 MCG tablet Take 150 mcg by mouth daily 10/26/17   Historical Provider, MD   fluticasone-salmeterol (ADVAIR) 250-50 MCG/DOSE AEPB Inhale into the lungs  Patient not taking: Reported on 10/20/2021 7/10/17   Historical Provider, MD   acetaminophen (TYLENOL) 500 MG tablet Take 500 mg by mouth    Historical Provider, MD   Adalimumab (HUMIRA PEN) 40 MG/0.4ML PNKT  7/16/19   Historical Provider, MD   ALPRAZolam Dahlia Mech) 0.25 MG tablet Take 0.25 mg by mouth 2 times daily.  1/17/15   Historical Provider, MD   diclofenac sodium (VOLTAREN) 1 % GEL APPLY 2-4 GRAMS TO PAINFUL JOINT QID PRN. MAX 32 GRAMS PER DAY 7/9/19   Historical Provider, MD   esomeprazole (NEXIUM) 40 MG delayed release capsule TK ONE C PO  BID 6/8/20   Historical Provider, MD   montelukast (SINGULAIR) 10 MG tablet TK 1 T PO QD IN THE KATIUSKA  Patient not taking: Reported on 6/12/2021 6/13/20   Historical Provider, MD   nystatin (MYCOSTATIN) 011493 UNIT/GM powder APPLY TO THE AFFECTED AREA(S) THREE TIMES A DAY  Patient not taking: Reported on 6/12/2021 4/18/19   Historical Provider, MD   promethazine (PHENERGAN) 25 MG tablet Take 25 mg by mouth every 6 hours as needed 4/5/19   Historical Provider, MD   topiramate (TOPAMAX) 25 MG tablet Take 75 mg by mouth nightly 6/19/20   Historical Provider, MD   rizatriptan (MAXALT) 5 MG tablet Take 5 mg by mouth once as needed 4/5/19 10/20/21  Historical Provider, MD   traZODone (DESYREL) 100 MG tablet TK 1 T PO QHS 3/20/18   Historical Provider, MD   triamcinolone (NASACORT) 55 MCG/ACT nasal inhaler INSTILL 2 SPRAYS IEN ONCE DAILY 2/3/16   Historical Provider, MD   venlafaxine (EFFEXOR XR) 150 MG extended release capsule Take 1 capsule by mouth daily 10/30/15   Historical Provider, MD   lamoTRIgine (LAMICTAL) 100 MG tablet TK 1 T PO D WITH DINNER 3/14/17   Historical Provider, MD       No future appointments.

## 2022-10-05 ENCOUNTER — CARE COORDINATION (OUTPATIENT)
Dept: OTHER | Facility: CLINIC | Age: 47
End: 2022-10-05

## 2022-10-05 NOTE — CARE COORDINATION
Ambulatory Care Coordination Note  10/5/2022    ACC: Luis Crawley RN    ACM attempted to reach patient for follow up call regarding her follow up EGD at [facility] . HIPAA compliant message left requesting a return phone call at patients convenience. Will continue to follow. Offered patient enrollment in the Remote Patient Monitoring (RPM) program for in-home monitoring: NA.    Care Coordination Interventions    Referral from Primary Care Provider: No  Suggested Interventions and Community Resources  Disease Specific Clinic: Completed (Comment: Dr Eloise Gomez ( GI) with [facility], Dr Serg Day [facility], Neurology)         Prior to Admission medications    Medication Sig Start Date End Date Taking? Authorizing Provider   albuterol (PROVENTIL) (5 MG/ML) 0.5% nebulizer solution Take 0.5 mLs by nebulization every 6 hours as needed for Wheezing 8/8/22   FARZANA Grace   oxyCODONE-acetaminophen (PERCOCET) 5-325 MG per tablet Take 1 tablet by mouth every 8 hours as needed for Pain.     Historical Provider, MD   fluticasone-salmeterol (ADVAIR) 250-50 MCG/DOSE AEPB Inhale 1 puff into the lungs every 12 hours 10/20/21   MAINE Ardon   fluticasone-salmeterol (ADVAIR HFA) 115-21 MCG/ACT inhaler     Historical Provider, MD   cetirizine (ZYRTEC) 10 MG tablet Take 10 mg by mouth daily    Historical Provider, MD   naproxen (NAPROSYN) 500 MG tablet Take 1 tablet by mouth 2 times daily (with meals)  Patient not taking: Reported on 10/20/2021 9/6/20   Jcarlos Cordon MD   levothyroxine (SYNTHROID) 175 MCG tablet Take 150 mcg by mouth daily 10/26/17   Historical Provider, MD   fluticasone-salmeterol (ADVAIR) 250-50 MCG/DOSE AEPB Inhale into the lungs  Patient not taking: Reported on 10/20/2021 7/10/17   Historical Provider, MD   acetaminophen (TYLENOL) 500 MG tablet Take 500 mg by mouth    Historical Provider, MD   Adalimumab (HUMIRA PEN) 40 MG/0.4ML PNKT  7/16/19   Historical Provider, MD ALPRAZolam (XANAX) 0.25 MG tablet Take 0.25 mg by mouth 2 times daily. 1/17/15   Historical Provider, MD   diclofenac sodium (VOLTAREN) 1 % GEL APPLY 2-4 GRAMS TO PAINFUL JOINT QID PRN. MAX 32 GRAMS PER DAY 7/9/19   Historical Provider, MD   esomeprazole (NEXIUM) 40 MG delayed release capsule TK ONE C PO  BID 6/8/20   Historical Provider, MD   montelukast (SINGULAIR) 10 MG tablet TK 1 T PO QD IN THE KATIUSKA  Patient not taking: Reported on 6/12/2021 6/13/20   Historical Provider, MD   nystatin (MYCOSTATIN) 269629 UNIT/GM powder APPLY TO THE AFFECTED AREA(S) THREE TIMES A DAY  Patient not taking: Reported on 6/12/2021 4/18/19   Historical Provider, MD   promethazine (PHENERGAN) 25 MG tablet Take 25 mg by mouth every 6 hours as needed 4/5/19   Historical Provider, MD   topiramate (TOPAMAX) 25 MG tablet Take 75 mg by mouth nightly 6/19/20   Historical Provider, MD   rizatriptan (MAXALT) 5 MG tablet Take 5 mg by mouth once as needed 4/5/19 10/20/21  Historical Provider, MD   traZODone (DESYREL) 100 MG tablet TK 1 T PO QHS 3/20/18   Historical Provider, MD   triamcinolone (NASACORT) 55 MCG/ACT nasal inhaler INSTILL 2 SPRAYS IEN ONCE DAILY 2/3/16   Historical Provider, MD   venlafaxine (EFFEXOR XR) 150 MG extended release capsule Take 1 capsule by mouth daily 10/30/15   Historical Provider, MD   lamoTRIgine (LAMICTAL) 100 MG tablet TK 1 T PO D WITH DINNER 3/14/17   Historical Provider, MD       No future appointments.

## 2022-10-11 ENCOUNTER — CARE COORDINATION (OUTPATIENT)
Dept: OTHER | Facility: CLINIC | Age: 47
End: 2022-10-11

## 2022-10-11 NOTE — CARE COORDINATION
Ambulatory Care Coordination Note  10/11/2022    ACC: Ned Hughes, RN    Pt states her EGD at [facility] came back good, no cancer. She is doing well overall she said except for her arthritis is bothering her some. She is on humira for this. She said she was laid off in august but she has applied for other jobs and also for unemployment. She said she has all her medications and has no needs except to find a job where hopefully she can work mostly from home. She has met her goals. Had one hospital admission in August due to pneumonia. Pt in agreement with graduation. She has my contact information if anything should change. ACM will sign off at this time. Offered patient enrollment in the Remote Patient Monitoring (RPM) program for in-home monitoring: NA.        Care Coordination Interventions    Referral from Primary Care Provider: No  Suggested Interventions and Community Resources  Disease Specific Clinic: Completed (Comment: Dr Zamarripa Forward ( GI) with [facility], Dr Delgado Smoker [facility], Neurology)          Goals Addressed                   This Visit's Progress     COMPLETED: Conditions and Symptoms        I will schedule office visits, as directed by my provider. I will keep my appointment or reschedule if I have to cancel. I will notify my provider of any barriers to my plan of care. I will follow my Zone Management tool to seek urgent or emergent care. I will notify my provider of any symptoms that indicate a worsening of my condition. Barriers: overwhelmed by complexity of regimen and stress  Plan for overcoming my barriers: work with Alok Valdez, PCP for education, support and resources as needed  Confidence: 9/10  Anticipated Goal Completion Date: 8/8/22 and ongoing  10/11/22 - Goal met                 Prior to Admission medications    Medication Sig Start Date End Date Taking?  Authorizing Provider   albuterol (PROVENTIL) (5 MG/ML) 0.5% nebulizer solution Take 0.5 mLs by nebulization every 6 hours as needed for Wheezing 8/8/22   FARZANA De Los Santos   oxyCODONE-acetaminophen (PERCOCET) 5-325 MG per tablet Take 1 tablet by mouth every 8 hours as needed for Pain. Historical Provider, MD   fluticasone-salmeterol (ADVAIR) 250-50 MCG/DOSE AEPB Inhale 1 puff into the lungs every 12 hours 10/20/21   MAINE Ardon   fluticasone-salmeterol (ADVAIR HFA) 115-21 MCG/ACT inhaler     Historical Provider, MD   cetirizine (ZYRTEC) 10 MG tablet Take 10 mg by mouth daily    Historical Provider, MD   naproxen (NAPROSYN) 500 MG tablet Take 1 tablet by mouth 2 times daily (with meals)  Patient not taking: Reported on 10/20/2021 9/6/20   Giovanna Lawrence MD   levothyroxine (SYNTHROID) 175 MCG tablet Take 150 mcg by mouth daily 10/26/17   Historical Provider, MD   fluticasone-salmeterol (ADVAIR) 250-50 MCG/DOSE AEPB Inhale into the lungs  Patient not taking: Reported on 10/20/2021 7/10/17   Historical Provider, MD   acetaminophen (TYLENOL) 500 MG tablet Take 500 mg by mouth    Historical Provider, MD   Adalimumab (HUMIRA PEN) 40 MG/0.4ML PNKT  7/16/19   Historical Provider, MD   ALPRAZolam Brenda Thomson) 0.25 MG tablet Take 0.25 mg by mouth 2 times daily. 1/17/15   Historical Provider, MD   diclofenac sodium (VOLTAREN) 1 % GEL APPLY 2-4 GRAMS TO PAINFUL JOINT QID PRN.   MAX 32 GRAMS PER DAY 7/9/19   Historical Provider, MD   esomeprazole (NEXIUM) 40 MG delayed release capsule TK ONE C PO  BID 6/8/20   Historical Provider, MD   montelukast (SINGULAIR) 10 MG tablet TK 1 T PO QD IN THE KATIUSKA  Patient not taking: Reported on 6/12/2021 6/13/20   Historical Provider, MD   nystatin (MYCOSTATIN) 188283 UNIT/GM powder APPLY TO THE AFFECTED AREA(S) THREE TIMES A DAY  Patient not taking: Reported on 6/12/2021 4/18/19   Historical Provider, MD   promethazine (PHENERGAN) 25 MG tablet Take 25 mg by mouth every 6 hours as needed 4/5/19   Historical Provider, MD   topiramate (TOPAMAX) 25 MG tablet Take 75 mg by mouth nightly 6/19/20 Historical Provider, MD   rizatriptan (MAXALT) 5 MG tablet Take 5 mg by mouth once as needed 4/5/19 10/20/21  Historical Provider, MD   traZODone (DESYREL) 100 MG tablet TK 1 T PO QHS 3/20/18   Historical Provider, MD   triamcinolone (NASACORT) 55 MCG/ACT nasal inhaler INSTILL 2 SPRAYS IEN ONCE DAILY 2/3/16   Historical Provider, MD   venlafaxine (EFFEXOR XR) 150 MG extended release capsule Take 1 capsule by mouth daily 10/30/15   Historical Provider, MD   lamoTRIgine (LAMICTAL) 100 MG tablet TK 1 T PO D WITH DINNER 3/14/17   Historical Provider, MD       No future appointments.

## 2023-01-27 ENCOUNTER — APPOINTMENT (OUTPATIENT)
Dept: GENERAL RADIOLOGY | Age: 48
End: 2023-01-27

## 2023-01-27 ENCOUNTER — HOSPITAL ENCOUNTER (EMERGENCY)
Age: 48
Discharge: HOME | End: 2023-01-27
Attending: EMERGENCY MEDICINE

## 2023-01-27 ENCOUNTER — APPOINTMENT (OUTPATIENT)
Dept: CT IMAGING | Age: 48
End: 2023-01-27

## 2023-01-27 VITALS
DIASTOLIC BLOOD PRESSURE: 74 MMHG | TEMPERATURE: 97.7 F | HEART RATE: 98 BPM | RESPIRATION RATE: 18 BRPM | HEIGHT: 68 IN | SYSTOLIC BLOOD PRESSURE: 124 MMHG | BODY MASS INDEX: 37.13 KG/M2 | OXYGEN SATURATION: 94 % | WEIGHT: 245 LBS

## 2023-01-27 DIAGNOSIS — R07.89 ATYPICAL CHEST PAIN: Primary | ICD-10-CM

## 2023-01-27 LAB
ACANTHOCYTES: ABNORMAL
ADENOVIRUS BY PCR: NOT DETECTED
ALBUMIN SERPL-MCNC: 4.2 G/DL (ref 3.5–5.2)
ALP BLD-CCNC: 100 U/L (ref 35–104)
ALT SERPL-CCNC: 12 U/L (ref 5–33)
ANION GAP SERPL CALCULATED.3IONS-SCNC: 14 MMOL/L (ref 7–19)
ANISOCYTOSIS: ABNORMAL
AST SERPL-CCNC: 13 U/L (ref 5–32)
BASOPHILS ABSOLUTE: 0 K/UL (ref 0–0.2)
BASOPHILS RELATIVE PERCENT: 0.5 % (ref 0–1)
BILIRUB SERPL-MCNC: 0.5 MG/DL (ref 0.2–1.2)
BORDETELLA PARAPERTUSSIS BY PCR: NOT DETECTED
BORDETELLA PERTUSSIS BY PCR: NOT DETECTED
BUN BLDV-MCNC: 11 MG/DL (ref 6–20)
CALCIUM SERPL-MCNC: 9.3 MG/DL (ref 8.6–10)
CHLAMYDOPHILIA PNEUMONIAE BY PCR: NOT DETECTED
CHLORIDE BLD-SCNC: 98 MMOL/L (ref 98–111)
CO2: 22 MMOL/L (ref 22–29)
CORONAVIRUS 229E BY PCR: NOT DETECTED
CORONAVIRUS HKU1 BY PCR: NOT DETECTED
CORONAVIRUS NL63 BY PCR: NOT DETECTED
CORONAVIRUS OC43 BY PCR: NOT DETECTED
CREAT SERPL-MCNC: 0.9 MG/DL (ref 0.5–0.9)
EOSINOPHILS ABSOLUTE: 0.1 K/UL (ref 0–0.6)
EOSINOPHILS RELATIVE PERCENT: 0.9 % (ref 0–5)
GFR SERPL CREATININE-BSD FRML MDRD: >60 ML/MIN/{1.73_M2}
GLUCOSE BLD-MCNC: 104 MG/DL (ref 74–109)
HCT VFR BLD CALC: 29.5 % (ref 37–47)
HEMOGLOBIN: 8.2 G/DL (ref 12–16)
HUMAN METAPNEUMOVIRUS BY PCR: NOT DETECTED
HUMAN RHINOVIRUS/ENTEROVIRUS BY PCR: NOT DETECTED
IMMATURE GRANULOCYTES #: 0 K/UL
INFLUENZA A BY PCR: NOT DETECTED
INFLUENZA B BY PCR: NOT DETECTED
LYMPHOCYTES ABSOLUTE: 0.7 K/UL (ref 1.1–4.5)
LYMPHOCYTES RELATIVE PERCENT: 9.1 % (ref 20–40)
MCH RBC QN AUTO: 17.7 PG (ref 27–31)
MCHC RBC AUTO-ENTMCNC: 27.8 G/DL (ref 33–37)
MCV RBC AUTO: 63.9 FL (ref 81–99)
MICROCYTES: ABNORMAL
MONOCYTES ABSOLUTE: 0.3 K/UL (ref 0–0.9)
MONOCYTES RELATIVE PERCENT: 4.1 % (ref 0–10)
MYCOPLASMA PNEUMONIAE BY PCR: NOT DETECTED
NEUTROPHILS ABSOLUTE: 6.9 K/UL (ref 1.5–7.5)
NEUTROPHILS RELATIVE PERCENT: 84.9 % (ref 50–65)
OVALOCYTES: ABNORMAL
PARAINFLUENZA VIRUS 1 BY PCR: NOT DETECTED
PARAINFLUENZA VIRUS 2 BY PCR: NOT DETECTED
PARAINFLUENZA VIRUS 3 BY PCR: NOT DETECTED
PARAINFLUENZA VIRUS 4 BY PCR: NOT DETECTED
PDW BLD-RTO: 20.7 % (ref 11.5–14.5)
PLATELET # BLD: 419 K/UL (ref 130–400)
PLATELET SLIDE REVIEW: ABNORMAL
PMV BLD AUTO: 10.5 FL (ref 9.4–12.3)
POTASSIUM SERPL-SCNC: 4.5 MMOL/L (ref 3.5–5)
RBC # BLD: 4.62 M/UL (ref 4.2–5.4)
RESPIRATORY SYNCYTIAL VIRUS BY PCR: NOT DETECTED
SARS-COV-2, PCR: NOT DETECTED
SODIUM BLD-SCNC: 134 MMOL/L (ref 136–145)
TOTAL PROTEIN: 7.8 G/DL (ref 6.6–8.7)
TROPONIN: <0.01 NG/ML (ref 0–0.03)
WBC # BLD: 8.1 K/UL (ref 4.8–10.8)

## 2023-01-27 PROCEDURE — 99285 EMERGENCY DEPT VISIT HI MDM: CPT

## 2023-01-27 PROCEDURE — 6360000002 HC RX W HCPCS: Performed by: EMERGENCY MEDICINE

## 2023-01-27 PROCEDURE — 85025 COMPLETE CBC W/AUTO DIFF WBC: CPT

## 2023-01-27 PROCEDURE — 96374 THER/PROPH/DIAG INJ IV PUSH: CPT

## 2023-01-27 PROCEDURE — 36415 COLL VENOUS BLD VENIPUNCTURE: CPT

## 2023-01-27 PROCEDURE — 71275 CT ANGIOGRAPHY CHEST: CPT

## 2023-01-27 PROCEDURE — 93005 ELECTROCARDIOGRAM TRACING: CPT | Performed by: EMERGENCY MEDICINE

## 2023-01-27 PROCEDURE — 0202U NFCT DS 22 TRGT SARS-COV-2: CPT

## 2023-01-27 PROCEDURE — 80053 COMPREHEN METABOLIC PANEL: CPT

## 2023-01-27 PROCEDURE — 84484 ASSAY OF TROPONIN QUANT: CPT

## 2023-01-27 PROCEDURE — 71045 X-RAY EXAM CHEST 1 VIEW: CPT

## 2023-01-27 PROCEDURE — 71045 X-RAY EXAM CHEST 1 VIEW: CPT | Performed by: RADIOLOGY

## 2023-01-27 PROCEDURE — 36410 VNPNXR 3YR/> PHY/QHP DX/THER: CPT

## 2023-01-27 PROCEDURE — 71275 CT ANGIOGRAPHY CHEST: CPT | Performed by: RADIOLOGY

## 2023-01-27 RX ORDER — MORPHINE SULFATE 4 MG/ML
4 INJECTION, SOLUTION INTRAMUSCULAR; INTRAVENOUS ONCE
Status: COMPLETED | OUTPATIENT
Start: 2023-01-27 | End: 2023-01-27

## 2023-01-27 RX ADMIN — MORPHINE SULFATE 4 MG: 4 INJECTION, SOLUTION INTRAMUSCULAR; INTRAVENOUS at 10:43

## 2023-01-27 ASSESSMENT — PAIN - FUNCTIONAL ASSESSMENT: PAIN_FUNCTIONAL_ASSESSMENT: NONE - DENIES PAIN

## 2023-01-27 ASSESSMENT — PAIN SCALES - GENERAL: PAINLEVEL_OUTOF10: 8
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
## 2023-01-30 LAB
EKG P AXIS: 48 DEGREES
EKG P-R INTERVAL: 166 MS
EKG Q-T INTERVAL: 316 MS
EKG QRS DURATION: 82 MS
EKG QTC CALCULATION (BAZETT): 407 MS
EKG T AXIS: 28 DEGREES

## 2023-01-30 PROCEDURE — 93010 ELECTROCARDIOGRAM REPORT: CPT | Performed by: INTERNAL MEDICINE

## 2023-01-31 ASSESSMENT — ENCOUNTER SYMPTOMS
ABDOMINAL PAIN: 0
COUGH: 0
VOMITING: 0
DIARRHEA: 0
SHORTNESS OF BREATH: 1
NAUSEA: 0

## 2023-02-09 ENCOUNTER — TRANSCRIBE ORDERS (OUTPATIENT)
Dept: ADMINISTRATIVE | Facility: HOSPITAL | Age: 48
End: 2023-02-09
Payer: COMMERCIAL

## 2023-02-09 DIAGNOSIS — Z12.31 VISIT FOR SCREENING MAMMOGRAM: Primary | ICD-10-CM

## 2023-02-11 ENCOUNTER — HOSPITAL ENCOUNTER (INPATIENT)
Facility: HOSPITAL | Age: 48
LOS: 13 days | Discharge: HOME HEALTH | DRG: 216 | End: 2023-03-01
Attending: EMERGENCY MEDICINE | Admitting: INTERNAL MEDICINE
Payer: COMMERCIAL

## 2023-02-11 ENCOUNTER — APPOINTMENT (OUTPATIENT)
Dept: GENERAL RADIOLOGY | Facility: HOSPITAL | Age: 48
DRG: 216 | End: 2023-02-11
Payer: COMMERCIAL

## 2023-02-11 ENCOUNTER — APPOINTMENT (OUTPATIENT)
Dept: CT IMAGING | Facility: HOSPITAL | Age: 48
DRG: 216 | End: 2023-02-11
Payer: COMMERCIAL

## 2023-02-11 DIAGNOSIS — I25.118 CORONARY ARTERY DISEASE OF NATIVE ARTERY OF NATIVE HEART WITH STABLE ANGINA PECTORIS: ICD-10-CM

## 2023-02-11 DIAGNOSIS — R07.9 CHEST PAIN, UNSPECIFIED TYPE: ICD-10-CM

## 2023-02-11 DIAGNOSIS — I50.9 CONGESTIVE HEART FAILURE, UNSPECIFIED HF CHRONICITY, UNSPECIFIED HEART FAILURE TYPE: Primary | ICD-10-CM

## 2023-02-11 DIAGNOSIS — Z95.2 S/P MVR (MITRAL VALVE REPLACEMENT): ICD-10-CM

## 2023-02-11 DIAGNOSIS — Q21.12 PFO (PATENT FORAMEN OVALE): ICD-10-CM

## 2023-02-11 DIAGNOSIS — Z95.1 S/P CABG X 1: ICD-10-CM

## 2023-02-11 DIAGNOSIS — I34.0 NONRHEUMATIC MITRAL VALVE REGURGITATION: ICD-10-CM

## 2023-02-11 LAB
ALBUMIN SERPL-MCNC: 4.1 G/DL (ref 3.5–5.2)
ALBUMIN/GLOB SERPL: 1 G/DL
ALP SERPL-CCNC: 103 U/L (ref 39–117)
ALT SERPL W P-5'-P-CCNC: 12 U/L (ref 1–33)
ANION GAP SERPL CALCULATED.3IONS-SCNC: 12 MMOL/L (ref 5–15)
ANISOCYTOSIS BLD QL: NORMAL
APTT PPP: 31.3 SECONDS (ref 61–76.5)
AST SERPL-CCNC: 14 U/L (ref 1–32)
BASOPHILS # BLD AUTO: 0.1 10*3/MM3 (ref 0–0.2)
BASOPHILS NFR BLD AUTO: 1.3 % (ref 0–1.5)
BILIRUB SERPL-MCNC: 0.4 MG/DL (ref 0–1.2)
BUN SERPL-MCNC: 10 MG/DL (ref 6–20)
BUN/CREAT SERPL: 11.1 (ref 7–25)
CALCIUM SPEC-SCNC: 9.2 MG/DL (ref 8.6–10.5)
CHLORIDE SERPL-SCNC: 101 MMOL/L (ref 98–107)
CO2 SERPL-SCNC: 23 MMOL/L (ref 22–29)
CREAT SERPL-MCNC: 0.9 MG/DL (ref 0.57–1)
D DIMER PPP FEU-MCNC: 1.02 MG/L (FEU) (ref 0–0.5)
DEPRECATED RDW RBC AUTO: 43.3 FL (ref 37–54)
EGFRCR SERPLBLD CKD-EPI 2021: 78.5 ML/MIN/1.73
ELLIPTOCYTES BLD QL SMEAR: NORMAL
EOSINOPHIL # BLD AUTO: 0.1 10*3/MM3 (ref 0–0.4)
EOSINOPHIL NFR BLD AUTO: 1.7 % (ref 0.3–6.2)
ERYTHROCYTE [DISTWIDTH] IN BLOOD BY AUTOMATED COUNT: 20.3 % (ref 12.3–15.4)
FERRITIN SERPL-MCNC: 13.1 NG/ML (ref 13–150)
FOLATE SERPL-MCNC: 9.2 NG/ML (ref 4.78–24.2)
GEN 5 2HR TROPONIN T REFLEX: 13 NG/L
GLOBULIN UR ELPH-MCNC: 4.2 GM/DL
GLUCOSE SERPL-MCNC: 88 MG/DL (ref 65–99)
HAPTOGLOB SERPL-MCNC: 185 MG/DL (ref 30–200)
HCT VFR BLD AUTO: 27.3 % (ref 34–46.6)
HGB BLD-MCNC: 7.7 G/DL (ref 12–15.9)
INR PPP: 1.08 (ref 0.93–1.1)
IRON 24H UR-MRATE: 12 MCG/DL (ref 37–145)
IRON SATN MFR SERPL: 3 % (ref 20–50)
LARGE PLATELETS: NORMAL
LYMPHOCYTES # BLD AUTO: 0.6 10*3/MM3 (ref 0.7–3.1)
LYMPHOCYTES NFR BLD AUTO: 11.6 % (ref 19.6–45.3)
MCH RBC QN AUTO: 17 PG (ref 26.6–33)
MCHC RBC AUTO-ENTMCNC: 28.2 G/DL (ref 31.5–35.7)
MCV RBC AUTO: 60.2 FL (ref 79–97)
MICROCYTES BLD QL: NORMAL
MONOCYTES # BLD AUTO: 0.3 10*3/MM3 (ref 0.1–0.9)
MONOCYTES NFR BLD AUTO: 5.4 % (ref 5–12)
NEUTROPHILS NFR BLD AUTO: 4.3 10*3/MM3 (ref 1.7–7)
NEUTROPHILS NFR BLD AUTO: 80 % (ref 42.7–76)
NRBC BLD AUTO-RTO: 0 /100 WBC (ref 0–0.2)
NT-PROBNP SERPL-MCNC: 849.8 PG/ML (ref 0–450)
PLATELET # BLD AUTO: 480 10*3/MM3 (ref 140–450)
PMV BLD AUTO: 8.6 FL (ref 6–12)
POIKILOCYTOSIS BLD QL SMEAR: NORMAL
POTASSIUM SERPL-SCNC: 4.2 MMOL/L (ref 3.5–5.2)
PROT SERPL-MCNC: 8.3 G/DL (ref 6–8.5)
PROTHROMBIN TIME: 11.1 SECONDS (ref 9.6–11.7)
RBC # BLD AUTO: 4.53 10*6/MM3 (ref 3.77–5.28)
RETICS # AUTO: 0.07 10*6/MM3 (ref 0.02–0.13)
RETICS/RBC NFR AUTO: 1.53 % (ref 0.7–1.9)
SODIUM SERPL-SCNC: 136 MMOL/L (ref 136–145)
TIBC SERPL-MCNC: 463 MCG/DL (ref 298–536)
TRANSFERRIN SERPL-MCNC: 311 MG/DL (ref 200–360)
TROPONIN T DELTA: -1 NG/L
TROPONIN T SERPL HS-MCNC: 14 NG/L
VIT B12 BLD-MCNC: 600 PG/ML (ref 211–946)
WBC MORPH BLD: NORMAL
WBC NRBC COR # BLD: 5.4 10*3/MM3 (ref 3.4–10.8)

## 2023-02-11 PROCEDURE — 83010 ASSAY OF HAPTOGLOBIN QUANT: CPT | Performed by: INTERNAL MEDICINE

## 2023-02-11 PROCEDURE — 85610 PROTHROMBIN TIME: CPT | Performed by: EMERGENCY MEDICINE

## 2023-02-11 PROCEDURE — 83880 ASSAY OF NATRIURETIC PEPTIDE: CPT | Performed by: EMERGENCY MEDICINE

## 2023-02-11 PROCEDURE — 71275 CT ANGIOGRAPHY CHEST: CPT

## 2023-02-11 PROCEDURE — 85730 THROMBOPLASTIN TIME PARTIAL: CPT | Performed by: EMERGENCY MEDICINE

## 2023-02-11 PROCEDURE — 84484 ASSAY OF TROPONIN QUANT: CPT | Performed by: EMERGENCY MEDICINE

## 2023-02-11 PROCEDURE — 0 IOPAMIDOL PER 1 ML: Performed by: EMERGENCY MEDICINE

## 2023-02-11 PROCEDURE — 82607 VITAMIN B-12: CPT | Performed by: INTERNAL MEDICINE

## 2023-02-11 PROCEDURE — 93005 ELECTROCARDIOGRAM TRACING: CPT

## 2023-02-11 PROCEDURE — 80053 COMPREHEN METABOLIC PANEL: CPT | Performed by: EMERGENCY MEDICINE

## 2023-02-11 PROCEDURE — 85007 BL SMEAR W/DIFF WBC COUNT: CPT | Performed by: EMERGENCY MEDICINE

## 2023-02-11 PROCEDURE — 99284 EMERGENCY DEPT VISIT MOD MDM: CPT

## 2023-02-11 PROCEDURE — 82728 ASSAY OF FERRITIN: CPT | Performed by: NURSE PRACTITIONER

## 2023-02-11 PROCEDURE — 71045 X-RAY EXAM CHEST 1 VIEW: CPT

## 2023-02-11 PROCEDURE — 36415 COLL VENOUS BLD VENIPUNCTURE: CPT

## 2023-02-11 PROCEDURE — 83540 ASSAY OF IRON: CPT | Performed by: INTERNAL MEDICINE

## 2023-02-11 PROCEDURE — G0378 HOSPITAL OBSERVATION PER HR: HCPCS

## 2023-02-11 PROCEDURE — 85045 AUTOMATED RETICULOCYTE COUNT: CPT | Performed by: INTERNAL MEDICINE

## 2023-02-11 PROCEDURE — 85379 FIBRIN DEGRADATION QUANT: CPT | Performed by: EMERGENCY MEDICINE

## 2023-02-11 PROCEDURE — 94799 UNLISTED PULMONARY SVC/PX: CPT

## 2023-02-11 PROCEDURE — 84466 ASSAY OF TRANSFERRIN: CPT | Performed by: INTERNAL MEDICINE

## 2023-02-11 PROCEDURE — 85025 COMPLETE CBC W/AUTO DIFF WBC: CPT | Performed by: EMERGENCY MEDICINE

## 2023-02-11 PROCEDURE — 94640 AIRWAY INHALATION TREATMENT: CPT

## 2023-02-11 PROCEDURE — 82746 ASSAY OF FOLIC ACID SERUM: CPT | Performed by: INTERNAL MEDICINE

## 2023-02-11 PROCEDURE — 93005 ELECTROCARDIOGRAM TRACING: CPT | Performed by: EMERGENCY MEDICINE

## 2023-02-11 PROCEDURE — 25010000002 KETOROLAC TROMETHAMINE PER 15 MG: Performed by: EMERGENCY MEDICINE

## 2023-02-11 RX ORDER — SODIUM CHLORIDE 0.9 % (FLUSH) 0.9 %
3 SYRINGE (ML) INJECTION EVERY 12 HOURS SCHEDULED
Status: DISCONTINUED | OUTPATIENT
Start: 2023-02-11 | End: 2023-02-20

## 2023-02-11 RX ORDER — ALBUTEROL SULFATE 0.63 MG/3ML
1 SOLUTION RESPIRATORY (INHALATION) EVERY 6 HOURS PRN
Status: DISCONTINUED | OUTPATIENT
Start: 2023-02-11 | End: 2023-02-20

## 2023-02-11 RX ORDER — ONDANSETRON 2 MG/ML
4 INJECTION INTRAMUSCULAR; INTRAVENOUS EVERY 6 HOURS PRN
Status: DISCONTINUED | OUTPATIENT
Start: 2023-02-11 | End: 2023-02-20

## 2023-02-11 RX ORDER — ALPRAZOLAM 0.25 MG/1
0.25 TABLET ORAL 2 TIMES DAILY PRN
Status: DISCONTINUED | OUTPATIENT
Start: 2023-02-11 | End: 2023-02-20

## 2023-02-11 RX ORDER — NYSTATIN 100000 [USP'U]/G
POWDER TOPICAL 2 TIMES DAILY PRN
COMMUNITY
End: 2023-03-17 | Stop reason: HOSPADM

## 2023-02-11 RX ORDER — KETOROLAC TROMETHAMINE 15 MG/ML
15 INJECTION, SOLUTION INTRAMUSCULAR; INTRAVENOUS ONCE
Status: COMPLETED | OUTPATIENT
Start: 2023-02-11 | End: 2023-02-11

## 2023-02-11 RX ORDER — LEVOTHYROXINE SODIUM 0.15 MG/1
150 TABLET ORAL DAILY
COMMUNITY

## 2023-02-11 RX ORDER — ACETAMINOPHEN 500 MG
500 TABLET ORAL EVERY 6 HOURS PRN
Status: DISCONTINUED | OUTPATIENT
Start: 2023-02-11 | End: 2023-02-20

## 2023-02-11 RX ORDER — BUDESONIDE AND FORMOTEROL FUMARATE DIHYDRATE 160; 4.5 UG/1; UG/1
2 AEROSOL RESPIRATORY (INHALATION)
Status: DISCONTINUED | OUTPATIENT
Start: 2023-02-11 | End: 2023-02-20

## 2023-02-11 RX ORDER — TRAZODONE HYDROCHLORIDE 100 MG/1
100 TABLET ORAL NIGHTLY
Status: DISCONTINUED | OUTPATIENT
Start: 2023-02-11 | End: 2023-02-20

## 2023-02-11 RX ORDER — LEVOTHYROXINE SODIUM 0.15 MG/1
150 TABLET ORAL
Status: DISCONTINUED | OUTPATIENT
Start: 2023-02-12 | End: 2023-02-20

## 2023-02-11 RX ORDER — SUCRALFATE 1 G/1
1 TABLET ORAL
Status: DISCONTINUED | OUTPATIENT
Start: 2023-02-11 | End: 2023-02-20

## 2023-02-11 RX ORDER — SODIUM CHLORIDE 9 MG/ML
40 INJECTION, SOLUTION INTRAVENOUS AS NEEDED
Status: DISCONTINUED | OUTPATIENT
Start: 2023-02-11 | End: 2023-02-20

## 2023-02-11 RX ORDER — LAMOTRIGINE 100 MG/1
200 TABLET ORAL DAILY
Status: DISCONTINUED | OUTPATIENT
Start: 2023-02-11 | End: 2023-02-13

## 2023-02-11 RX ORDER — SODIUM CHLORIDE 0.9 % (FLUSH) 0.9 %
10 SYRINGE (ML) INJECTION AS NEEDED
Status: DISCONTINUED | OUTPATIENT
Start: 2023-02-11 | End: 2023-02-20

## 2023-02-11 RX ORDER — CERTOLIZUMAB PEGOL 400 MG
KIT SUBCUTANEOUS
COMMUNITY
End: 2023-03-01 | Stop reason: HOSPADM

## 2023-02-11 RX ORDER — VENLAFAXINE HYDROCHLORIDE 75 MG/1
150 CAPSULE, EXTENDED RELEASE ORAL DAILY
Status: DISCONTINUED | OUTPATIENT
Start: 2023-02-11 | End: 2023-02-20

## 2023-02-11 RX ORDER — SODIUM CHLORIDE 0.9 % (FLUSH) 0.9 %
3-10 SYRINGE (ML) INJECTION AS NEEDED
Status: DISCONTINUED | OUTPATIENT
Start: 2023-02-11 | End: 2023-02-20

## 2023-02-11 RX ORDER — PANTOPRAZOLE SODIUM 40 MG/1
40 TABLET, DELAYED RELEASE ORAL
Status: DISCONTINUED | OUTPATIENT
Start: 2023-02-12 | End: 2023-02-20

## 2023-02-11 RX ADMIN — KETOROLAC TROMETHAMINE 15 MG: 15 INJECTION, SOLUTION INTRAMUSCULAR; INTRAVENOUS at 10:16

## 2023-02-11 RX ADMIN — LAMOTRIGINE 200 MG: 100 TABLET ORAL at 15:22

## 2023-02-11 RX ADMIN — SUCRALFATE 1 G: 1 TABLET ORAL at 23:11

## 2023-02-11 RX ADMIN — IOPAMIDOL 100 ML: 755 INJECTION, SOLUTION INTRAVENOUS at 11:57

## 2023-02-11 RX ADMIN — Medication 3 ML: at 21:00

## 2023-02-11 RX ADMIN — ALPRAZOLAM 0.25 MG: 0.5 TABLET ORAL at 23:11

## 2023-02-11 RX ADMIN — TRAZODONE HYDROCHLORIDE 100 MG: 100 TABLET ORAL at 23:11

## 2023-02-11 RX ADMIN — SUCRALFATE 1 G: 1 TABLET ORAL at 17:55

## 2023-02-11 RX ADMIN — BUDESONIDE AND FORMOTEROL FUMARATE DIHYDRATE 2 PUFF: 160; 4.5 AEROSOL RESPIRATORY (INHALATION) at 20:45

## 2023-02-11 RX ADMIN — VENLAFAXINE HYDROCHLORIDE 150 MG: 75 CAPSULE, EXTENDED RELEASE ORAL at 15:22

## 2023-02-12 ENCOUNTER — APPOINTMENT (OUTPATIENT)
Dept: CARDIOLOGY | Facility: HOSPITAL | Age: 48
DRG: 216 | End: 2023-02-12
Payer: COMMERCIAL

## 2023-02-12 LAB
ANISOCYTOSIS BLD QL: ABNORMAL
B PARAPERT DNA SPEC QL NAA+PROBE: NOT DETECTED
B PERT DNA SPEC QL NAA+PROBE: NOT DETECTED
C PNEUM DNA NPH QL NAA+NON-PROBE: NOT DETECTED
DEPRECATED RDW RBC AUTO: 42.9 FL (ref 37–54)
ELLIPTOCYTES BLD QL SMEAR: ABNORMAL
EOSINOPHIL # BLD MANUAL: 0.12 10*3/MM3 (ref 0–0.4)
EOSINOPHIL NFR BLD MANUAL: 3 % (ref 0.3–6.2)
ERYTHROCYTE [DISTWIDTH] IN BLOOD BY AUTOMATED COUNT: 20.1 % (ref 12.3–15.4)
FLUAV SUBTYP SPEC NAA+PROBE: NOT DETECTED
FLUBV RNA ISLT QL NAA+PROBE: NOT DETECTED
GIANT PLATELETS: ABNORMAL
HADV DNA SPEC NAA+PROBE: NOT DETECTED
HCOV 229E RNA SPEC QL NAA+PROBE: NOT DETECTED
HCOV HKU1 RNA SPEC QL NAA+PROBE: NOT DETECTED
HCOV NL63 RNA SPEC QL NAA+PROBE: NOT DETECTED
HCOV OC43 RNA SPEC QL NAA+PROBE: NOT DETECTED
HCT VFR BLD AUTO: 25.9 % (ref 34–46.6)
HGB BLD-MCNC: 7.4 G/DL (ref 12–15.9)
HMPV RNA NPH QL NAA+NON-PROBE: NOT DETECTED
HPIV1 RNA ISLT QL NAA+PROBE: NOT DETECTED
HPIV2 RNA SPEC QL NAA+PROBE: NOT DETECTED
HPIV3 RNA NPH QL NAA+PROBE: NOT DETECTED
HPIV4 P GENE NPH QL NAA+PROBE: NOT DETECTED
LYMPHOCYTES # BLD MANUAL: 0.4 10*3/MM3 (ref 0.7–3.1)
LYMPHOCYTES NFR BLD MANUAL: 6 % (ref 5–12)
M PNEUMO IGG SER IA-ACNC: NOT DETECTED
MCH RBC QN AUTO: 17.1 PG (ref 26.6–33)
MCHC RBC AUTO-ENTMCNC: 28.7 G/DL (ref 31.5–35.7)
MCV RBC AUTO: 59.7 FL (ref 79–97)
METAMYELOCYTES NFR BLD MANUAL: 1 % (ref 0–0)
MICROCYTES BLD QL: ABNORMAL
MONOCYTES # BLD: 0.24 10*3/MM3 (ref 0.1–0.9)
MYELOCYTES NFR BLD MANUAL: 1 % (ref 0–0)
NEUTROPHILS # BLD AUTO: 3.12 10*3/MM3 (ref 1.7–7)
NEUTROPHILS NFR BLD MANUAL: 70 % (ref 42.7–76)
NEUTS BAND NFR BLD MANUAL: 8 % (ref 0–5)
PLASMA CELL PREC NFR BLD MANUAL: 1 % (ref 0–0)
PLATELET # BLD AUTO: 434 10*3/MM3 (ref 140–450)
PMV BLD AUTO: 8.5 FL (ref 6–12)
POIKILOCYTOSIS BLD QL SMEAR: ABNORMAL
QT INTERVAL: 355 MS
RBC # BLD AUTO: 4.33 10*6/MM3 (ref 3.77–5.28)
RHINOVIRUS RNA SPEC NAA+PROBE: NOT DETECTED
RSV RNA NPH QL NAA+NON-PROBE: NOT DETECTED
SARS-COV-2 RNA NPH QL NAA+NON-PROBE: NOT DETECTED
SCAN SLIDE: NORMAL
VARIANT LYMPHS NFR BLD MANUAL: 10 % (ref 19.6–45.3)
WBC MORPH BLD: NORMAL
WBC NRBC COR # BLD: 4 10*3/MM3 (ref 3.4–10.8)

## 2023-02-12 PROCEDURE — 84165 PROTEIN E-PHORESIS SERUM: CPT | Performed by: NURSE PRACTITIONER

## 2023-02-12 PROCEDURE — 94799 UNLISTED PULMONARY SVC/PX: CPT

## 2023-02-12 PROCEDURE — 99214 OFFICE O/P EST MOD 30 MIN: CPT | Performed by: INTERNAL MEDICINE

## 2023-02-12 PROCEDURE — 0202U NFCT DS 22 TRGT SARS-COV-2: CPT | Performed by: INTERNAL MEDICINE

## 2023-02-12 PROCEDURE — 94761 N-INVAS EAR/PLS OXIMETRY MLT: CPT

## 2023-02-12 PROCEDURE — G0378 HOSPITAL OBSERVATION PER HR: HCPCS

## 2023-02-12 PROCEDURE — 85007 BL SMEAR W/DIFF WBC COUNT: CPT | Performed by: NURSE PRACTITIONER

## 2023-02-12 PROCEDURE — 84155 ASSAY OF PROTEIN SERUM: CPT | Performed by: NURSE PRACTITIONER

## 2023-02-12 PROCEDURE — 82525 ASSAY OF COPPER: CPT | Performed by: NURSE PRACTITIONER

## 2023-02-12 PROCEDURE — 94664 DEMO&/EVAL PT USE INHALER: CPT

## 2023-02-12 PROCEDURE — 25010000002 SULFUR HEXAFLUORIDE MICROSPH 60.7-25 MG RECONSTITUTED SUSPENSION: Performed by: INTERNAL MEDICINE

## 2023-02-12 PROCEDURE — 93306 TTE W/DOPPLER COMPLETE: CPT | Performed by: INTERNAL MEDICINE

## 2023-02-12 PROCEDURE — 85025 COMPLETE CBC W/AUTO DIFF WBC: CPT | Performed by: NURSE PRACTITIONER

## 2023-02-12 PROCEDURE — 25010000002 NA FERRIC GLUC CPLX PER 12.5 MG: Performed by: NURSE PRACTITIONER

## 2023-02-12 PROCEDURE — 93306 TTE W/DOPPLER COMPLETE: CPT

## 2023-02-12 RX ADMIN — Medication 3 ML: at 21:20

## 2023-02-12 RX ADMIN — ACETAMINOPHEN 500 MG: 500 TABLET, FILM COATED ORAL at 20:15

## 2023-02-12 RX ADMIN — SUCRALFATE 1 G: 1 TABLET ORAL at 08:27

## 2023-02-12 RX ADMIN — VENLAFAXINE HYDROCHLORIDE 150 MG: 75 CAPSULE, EXTENDED RELEASE ORAL at 08:27

## 2023-02-12 RX ADMIN — LEVOTHYROXINE SODIUM 150 MCG: 0.15 TABLET ORAL at 08:27

## 2023-02-12 RX ADMIN — ALPRAZOLAM 0.25 MG: 0.5 TABLET ORAL at 21:24

## 2023-02-12 RX ADMIN — BUDESONIDE AND FORMOTEROL FUMARATE DIHYDRATE 2 PUFF: 160; 4.5 AEROSOL RESPIRATORY (INHALATION) at 19:50

## 2023-02-12 RX ADMIN — BUDESONIDE AND FORMOTEROL FUMARATE DIHYDRATE 2 PUFF: 160; 4.5 AEROSOL RESPIRATORY (INHALATION) at 07:15

## 2023-02-12 RX ADMIN — Medication 3 ML: at 08:29

## 2023-02-12 RX ADMIN — SODIUM CHLORIDE 250 MG: 9 INJECTION, SOLUTION INTRAVENOUS at 11:06

## 2023-02-12 RX ADMIN — PANTOPRAZOLE SODIUM 40 MG: 40 TABLET, DELAYED RELEASE ORAL at 08:27

## 2023-02-12 RX ADMIN — SUCRALFATE 1 G: 1 TABLET ORAL at 11:16

## 2023-02-12 RX ADMIN — SULFUR HEXAFLUORIDE 2 ML: KIT at 09:37

## 2023-02-12 RX ADMIN — SUCRALFATE 1 G: 1 TABLET ORAL at 21:20

## 2023-02-12 RX ADMIN — ACETAMINOPHEN 500 MG: 500 TABLET, FILM COATED ORAL at 02:35

## 2023-02-12 RX ADMIN — SUCRALFATE 1 G: 1 TABLET ORAL at 17:47

## 2023-02-12 RX ADMIN — LAMOTRIGINE 200 MG: 100 TABLET ORAL at 08:27

## 2023-02-12 RX ADMIN — TRAZODONE HYDROCHLORIDE 100 MG: 100 TABLET ORAL at 21:20

## 2023-02-13 LAB
BH CV ECHO MEAS - ACS: 1.85 CM
BH CV ECHO MEAS - AO MAX PG: 11.3 MMHG
BH CV ECHO MEAS - AO MEAN PG: 4.9 MMHG
BH CV ECHO MEAS - AO ROOT DIAM: 3.2 CM
BH CV ECHO MEAS - AO V2 MAX: 168 CM/SEC
BH CV ECHO MEAS - AO V2 VTI: 31.1 CM
BH CV ECHO MEAS - AVA(I,D): 2 CM2
BH CV ECHO MEAS - EDV(CUBED): 111.8 ML
BH CV ECHO MEAS - EDV(MOD-SP4): 133.7 ML
BH CV ECHO MEAS - EF(MOD-BP): 66 %
BH CV ECHO MEAS - EF(MOD-SP4): 66.3 %
BH CV ECHO MEAS - ESV(CUBED): 39.4 ML
BH CV ECHO MEAS - ESV(MOD-SP4): 45.1 ML
BH CV ECHO MEAS - FS: 29.3 %
BH CV ECHO MEAS - IVS/LVPW: 0.74 CM
BH CV ECHO MEAS - IVSD: 0.93 CM
BH CV ECHO MEAS - LA DIMENSION: 4.4 CM
BH CV ECHO MEAS - LV MASS(C)D: 193 GRAMS
BH CV ECHO MEAS - LV MAX PG: 6.1 MMHG
BH CV ECHO MEAS - LV MEAN PG: 3.2 MMHG
BH CV ECHO MEAS - LV V1 MAX: 123.2 CM/SEC
BH CV ECHO MEAS - LV V1 VTI: 19.1 CM
BH CV ECHO MEAS - LVIDD: 4.8 CM
BH CV ECHO MEAS - LVIDS: 3.4 CM
BH CV ECHO MEAS - LVOT AREA: 3.2 CM2
BH CV ECHO MEAS - LVOT DIAM: 2.03 CM
BH CV ECHO MEAS - LVPWD: 1.26 CM
BH CV ECHO MEAS - MR MAX PG: 108.6 MMHG
BH CV ECHO MEAS - MR MAX VEL: 520.8 CM/SEC
BH CV ECHO MEAS - MV A MAX VEL: 187.3 CM/SEC
BH CV ECHO MEAS - MV DEC SLOPE: 1101 CM/SEC2
BH CV ECHO MEAS - MV DEC TIME: 0.2 MSEC
BH CV ECHO MEAS - MV E MAX VEL: 223.6 CM/SEC
BH CV ECHO MEAS - MV E/A: 1.19
BH CV ECHO MEAS - MV MAX PG: 19.8 MMHG
BH CV ECHO MEAS - MV MEAN PG: 13.2 MMHG
BH CV ECHO MEAS - MV V2 VTI: 48.6 CM
BH CV ECHO MEAS - MVA(VTI): 1.28 CM2
BH CV ECHO MEAS - PA ACC TIME: 0.09 SEC
BH CV ECHO MEAS - PA PR(ACCEL): 40.5 MMHG
BH CV ECHO MEAS - PA V2 MAX: 99.3 CM/SEC
BH CV ECHO MEAS - PULM A REVS DUR: 0.13 SEC
BH CV ECHO MEAS - PULM A REVS VEL: 34 CM/SEC
BH CV ECHO MEAS - PULM DIAS VEL: 58 CM/SEC
BH CV ECHO MEAS - PULM S/D: 1.17
BH CV ECHO MEAS - PULM SYS VEL: 67.9 CM/SEC
BH CV ECHO MEAS - RAP SYSTOLE: 3 MMHG
BH CV ECHO MEAS - RV MAX PG: 2.42 MMHG
BH CV ECHO MEAS - RV V1 MAX: 77.7 CM/SEC
BH CV ECHO MEAS - RV V1 VTI: 14.9 CM
BH CV ECHO MEAS - RVDD: 3.3 CM
BH CV ECHO MEAS - SV(LVOT): 62.2 ML
BH CV ECHO MEAS - SV(MOD-SP4): 88.6 ML
MAXIMAL PREDICTED HEART RATE: 171 BPM
STRESS TARGET HR: 145 BPM

## 2023-02-13 PROCEDURE — 94664 DEMO&/EVAL PT USE INHALER: CPT

## 2023-02-13 PROCEDURE — 25010000002 NA FERRIC GLUC CPLX PER 12.5 MG: Performed by: NURSE PRACTITIONER

## 2023-02-13 PROCEDURE — G0378 HOSPITAL OBSERVATION PER HR: HCPCS

## 2023-02-13 PROCEDURE — 94799 UNLISTED PULMONARY SVC/PX: CPT

## 2023-02-13 PROCEDURE — 99214 OFFICE O/P EST MOD 30 MIN: CPT | Performed by: INTERNAL MEDICINE

## 2023-02-13 PROCEDURE — 94761 N-INVAS EAR/PLS OXIMETRY MLT: CPT

## 2023-02-13 RX ORDER — LAMOTRIGINE 100 MG/1
200 TABLET ORAL NIGHTLY
Status: DISCONTINUED | OUTPATIENT
Start: 2023-02-13 | End: 2023-02-20

## 2023-02-13 RX ADMIN — PANTOPRAZOLE SODIUM 40 MG: 40 TABLET, DELAYED RELEASE ORAL at 06:03

## 2023-02-13 RX ADMIN — TRAZODONE HYDROCHLORIDE 100 MG: 100 TABLET ORAL at 21:33

## 2023-02-13 RX ADMIN — SUCRALFATE 1 G: 1 TABLET ORAL at 21:33

## 2023-02-13 RX ADMIN — SUCRALFATE 1 G: 1 TABLET ORAL at 13:09

## 2023-02-13 RX ADMIN — LEVOTHYROXINE SODIUM 150 MCG: 0.15 TABLET ORAL at 06:03

## 2023-02-13 RX ADMIN — ACETAMINOPHEN 500 MG: 500 TABLET, FILM COATED ORAL at 16:54

## 2023-02-13 RX ADMIN — Medication 3 ML: at 09:25

## 2023-02-13 RX ADMIN — SODIUM CHLORIDE 250 MG: 9 INJECTION, SOLUTION INTRAVENOUS at 09:23

## 2023-02-13 RX ADMIN — BUDESONIDE AND FORMOTEROL FUMARATE DIHYDRATE 2 PUFF: 160; 4.5 AEROSOL RESPIRATORY (INHALATION) at 07:50

## 2023-02-13 RX ADMIN — LAMOTRIGINE 200 MG: 100 TABLET ORAL at 22:56

## 2023-02-13 RX ADMIN — SUCRALFATE 1 G: 1 TABLET ORAL at 16:54

## 2023-02-13 RX ADMIN — Medication 3 ML: at 21:33

## 2023-02-13 RX ADMIN — VENLAFAXINE HYDROCHLORIDE 150 MG: 75 CAPSULE, EXTENDED RELEASE ORAL at 09:24

## 2023-02-13 RX ADMIN — ACETAMINOPHEN 500 MG: 500 TABLET, FILM COATED ORAL at 09:23

## 2023-02-13 RX ADMIN — SUCRALFATE 1 G: 1 TABLET ORAL at 09:24

## 2023-02-13 RX ADMIN — BUDESONIDE AND FORMOTEROL FUMARATE DIHYDRATE 2 PUFF: 160; 4.5 AEROSOL RESPIRATORY (INHALATION) at 19:05

## 2023-02-14 ENCOUNTER — APPOINTMENT (OUTPATIENT)
Dept: CARDIOLOGY | Facility: HOSPITAL | Age: 48
DRG: 216 | End: 2023-02-14
Payer: COMMERCIAL

## 2023-02-14 LAB
ALBUMIN SERPL ELPH-MCNC: 3.1 G/DL (ref 2.9–4.4)
ALBUMIN/GLOB SERPL: 0.7 {RATIO} (ref 0.7–1.7)
ALPHA1 GLOB SERPL ELPH-MCNC: 0.3 G/DL (ref 0–0.4)
ALPHA2 GLOB SERPL ELPH-MCNC: 1.1 G/DL (ref 0.4–1)
ANISOCYTOSIS BLD QL: NORMAL
B-GLOBULIN SERPL ELPH-MCNC: 1.2 G/DL (ref 0.7–1.3)
BASOPHILS # BLD AUTO: 0 10*3/MM3 (ref 0–0.2)
BASOPHILS NFR BLD AUTO: 0.9 % (ref 0–1.5)
BH CV ECHO SHUNT ASSESSMENT PERFORMED (HIDDEN SCRIPTING): 1
DEPRECATED RDW RBC AUTO: 42.4 FL (ref 37–54)
ELLIPTOCYTES BLD QL SMEAR: NORMAL
EOSINOPHIL # BLD AUTO: 0.1 10*3/MM3 (ref 0–0.4)
EOSINOPHIL NFR BLD AUTO: 2.3 % (ref 0.3–6.2)
ERYTHROCYTE [DISTWIDTH] IN BLOOD BY AUTOMATED COUNT: 19.9 % (ref 12.3–15.4)
GAMMA GLOB SERPL ELPH-MCNC: 1.8 G/DL (ref 0.4–1.8)
GLOBULIN SER CALC-MCNC: 4.4 G/DL (ref 2.2–3.9)
HCT VFR BLD AUTO: 26.9 % (ref 34–46.6)
HEMOCCULT STL QL IA: POSITIVE
HGB BLD-MCNC: 7.8 G/DL (ref 12–15.9)
LABORATORY COMMENT REPORT: ABNORMAL
LYMPHOCYTES # BLD AUTO: 1 10*3/MM3 (ref 0.7–3.1)
LYMPHOCYTES NFR BLD AUTO: 20.8 % (ref 19.6–45.3)
M PROTEIN SERPL ELPH-MCNC: ABNORMAL G/DL
MAXIMAL PREDICTED HEART RATE: 171 BPM
MCH RBC QN AUTO: 17.6 PG (ref 26.6–33)
MCHC RBC AUTO-ENTMCNC: 28.9 G/DL (ref 31.5–35.7)
MCV RBC AUTO: 60.8 FL (ref 79–97)
MICROCYTES BLD QL: NORMAL
MONOCYTES # BLD AUTO: 0.4 10*3/MM3 (ref 0.1–0.9)
MONOCYTES NFR BLD AUTO: 7.7 % (ref 5–12)
NEUTROPHILS NFR BLD AUTO: 3.4 10*3/MM3 (ref 1.7–7)
NEUTROPHILS NFR BLD AUTO: 68.3 % (ref 42.7–76)
NRBC BLD AUTO-RTO: 0.2 /100 WBC (ref 0–0.2)
PLAT MORPH BLD: NORMAL
PLATELET # BLD AUTO: 443 10*3/MM3 (ref 140–450)
PMV BLD AUTO: 8.6 FL (ref 6–12)
POIKILOCYTOSIS BLD QL SMEAR: NORMAL
POLYCHROMASIA BLD QL SMEAR: NORMAL
PROT PATTERN SERPL ELPH-IMP: ABNORMAL
PROT SERPL-MCNC: 7.5 G/DL (ref 6–8.5)
RBC # BLD AUTO: 4.43 10*6/MM3 (ref 3.77–5.28)
STRESS TARGET HR: 145 BPM
WBC MORPH BLD: NORMAL
WBC NRBC COR # BLD: 5 10*3/MM3 (ref 3.4–10.8)

## 2023-02-14 PROCEDURE — G0378 HOSPITAL OBSERVATION PER HR: HCPCS

## 2023-02-14 PROCEDURE — 25010000002 MIDAZOLAM PER 1 MG: Performed by: INTERNAL MEDICINE

## 2023-02-14 PROCEDURE — 96360 HYDRATION IV INFUSION INIT: CPT

## 2023-02-14 PROCEDURE — 93325 DOPPLER ECHO COLOR FLOW MAPG: CPT | Performed by: INTERNAL MEDICINE

## 2023-02-14 PROCEDURE — 25010000002 NA FERRIC GLUC CPLX PER 12.5 MG: Performed by: NURSE PRACTITIONER

## 2023-02-14 PROCEDURE — 25010000002 FENTANYL CITRATE (PF) 50 MCG/ML SOLUTION: Performed by: INTERNAL MEDICINE

## 2023-02-14 PROCEDURE — 99214 OFFICE O/P EST MOD 30 MIN: CPT | Performed by: INTERNAL MEDICINE

## 2023-02-14 PROCEDURE — 93325 DOPPLER ECHO COLOR FLOW MAPG: CPT

## 2023-02-14 PROCEDURE — 94799 UNLISTED PULMONARY SVC/PX: CPT

## 2023-02-14 PROCEDURE — 93312 ECHO TRANSESOPHAGEAL: CPT | Performed by: INTERNAL MEDICINE

## 2023-02-14 PROCEDURE — 93320 DOPPLER ECHO COMPLETE: CPT | Performed by: INTERNAL MEDICINE

## 2023-02-14 PROCEDURE — 82274 ASSAY TEST FOR BLOOD FECAL: CPT | Performed by: INTERNAL MEDICINE

## 2023-02-14 PROCEDURE — 94664 DEMO&/EVAL PT USE INHALER: CPT

## 2023-02-14 PROCEDURE — 94761 N-INVAS EAR/PLS OXIMETRY MLT: CPT

## 2023-02-14 PROCEDURE — 25010000002 ONDANSETRON PER 1 MG: Performed by: INTERNAL MEDICINE

## 2023-02-14 PROCEDURE — 85025 COMPLETE CBC W/AUTO DIFF WBC: CPT | Performed by: INTERNAL MEDICINE

## 2023-02-14 PROCEDURE — 85007 BL SMEAR W/DIFF WBC COUNT: CPT | Performed by: INTERNAL MEDICINE

## 2023-02-14 PROCEDURE — 93320 DOPPLER ECHO COMPLETE: CPT

## 2023-02-14 PROCEDURE — 93312 ECHO TRANSESOPHAGEAL: CPT

## 2023-02-14 RX ORDER — FENTANYL CITRATE 50 UG/ML
INJECTION, SOLUTION INTRAMUSCULAR; INTRAVENOUS
Status: COMPLETED | OUTPATIENT
Start: 2023-02-14 | End: 2023-02-14

## 2023-02-14 RX ORDER — MIDAZOLAM HYDROCHLORIDE 1 MG/ML
INJECTION INTRAMUSCULAR; INTRAVENOUS
Status: COMPLETED | OUTPATIENT
Start: 2023-02-14 | End: 2023-02-14

## 2023-02-14 RX ADMIN — SUCRALFATE 1 G: 1 TABLET ORAL at 08:34

## 2023-02-14 RX ADMIN — ONDANSETRON 4 MG: 2 INJECTION INTRAMUSCULAR; INTRAVENOUS at 10:31

## 2023-02-14 RX ADMIN — BUDESONIDE AND FORMOTEROL FUMARATE DIHYDRATE 2 PUFF: 160; 4.5 AEROSOL RESPIRATORY (INHALATION) at 19:03

## 2023-02-14 RX ADMIN — BUDESONIDE AND FORMOTEROL FUMARATE DIHYDRATE 2 PUFF: 160; 4.5 AEROSOL RESPIRATORY (INHALATION) at 07:31

## 2023-02-14 RX ADMIN — VENLAFAXINE HYDROCHLORIDE 150 MG: 75 CAPSULE, EXTENDED RELEASE ORAL at 08:34

## 2023-02-14 RX ADMIN — TRAZODONE HYDROCHLORIDE 100 MG: 100 TABLET ORAL at 21:35

## 2023-02-14 RX ADMIN — FENTANYL CITRATE 50 MCG: 50 INJECTION, SOLUTION INTRAMUSCULAR; INTRAVENOUS at 16:59

## 2023-02-14 RX ADMIN — FENTANYL CITRATE 25 MCG: 50 INJECTION, SOLUTION INTRAMUSCULAR; INTRAVENOUS at 16:56

## 2023-02-14 RX ADMIN — FENTANYL CITRATE 25 MCG: 50 INJECTION, SOLUTION INTRAMUSCULAR; INTRAVENOUS at 17:01

## 2023-02-14 RX ADMIN — SUCRALFATE 1 G: 1 TABLET ORAL at 21:35

## 2023-02-14 RX ADMIN — SUCRALFATE 1 G: 1 TABLET ORAL at 10:34

## 2023-02-14 RX ADMIN — MIDAZOLAM 2 MG: 1 INJECTION INTRAMUSCULAR; INTRAVENOUS at 16:56

## 2023-02-14 RX ADMIN — LEVOTHYROXINE SODIUM 150 MCG: 0.15 TABLET ORAL at 06:08

## 2023-02-14 RX ADMIN — Medication 3 ML: at 21:41

## 2023-02-14 RX ADMIN — Medication 3 ML: at 08:35

## 2023-02-14 RX ADMIN — MIDAZOLAM 2 MG: 1 INJECTION INTRAMUSCULAR; INTRAVENOUS at 17:01

## 2023-02-14 RX ADMIN — LAMOTRIGINE 200 MG: 100 TABLET ORAL at 21:35

## 2023-02-14 RX ADMIN — MIDAZOLAM 2 MG: 1 INJECTION INTRAMUSCULAR; INTRAVENOUS at 16:59

## 2023-02-14 RX ADMIN — SODIUM CHLORIDE 250 MG: 9 INJECTION, SOLUTION INTRAVENOUS at 08:35

## 2023-02-14 RX ADMIN — PANTOPRAZOLE SODIUM 40 MG: 40 TABLET, DELAYED RELEASE ORAL at 06:08

## 2023-02-15 PROBLEM — I34.0 NONRHEUMATIC MITRAL VALVE REGURGITATION: Status: ACTIVE | Noted: 2023-02-11

## 2023-02-15 LAB
ANISOCYTOSIS BLD QL: ABNORMAL
DEPRECATED RDW RBC AUTO: 42.9 FL (ref 37–54)
ELLIPTOCYTES BLD QL SMEAR: ABNORMAL
EOSINOPHIL # BLD MANUAL: 0.16 10*3/MM3 (ref 0–0.4)
EOSINOPHIL NFR BLD MANUAL: 3 % (ref 0.3–6.2)
ERYTHROCYTE [DISTWIDTH] IN BLOOD BY AUTOMATED COUNT: 20.3 % (ref 12.3–15.4)
HCT VFR BLD AUTO: 27.4 % (ref 34–46.6)
HGB BLD-MCNC: 8 G/DL (ref 12–15.9)
LYMPHOCYTES # BLD MANUAL: 1.25 10*3/MM3 (ref 0.7–3.1)
LYMPHOCYTES NFR BLD MANUAL: 6 % (ref 5–12)
MCH RBC QN AUTO: 17.9 PG (ref 26.6–33)
MCHC RBC AUTO-ENTMCNC: 29.2 G/DL (ref 31.5–35.7)
MCV RBC AUTO: 61.4 FL (ref 79–97)
MICROCYTES BLD QL: ABNORMAL
MONOCYTES # BLD: 0.31 10*3/MM3 (ref 0.1–0.9)
NEUTROPHILS # BLD AUTO: 3.43 10*3/MM3 (ref 1.7–7)
NEUTROPHILS NFR BLD MANUAL: 66 % (ref 42.7–76)
NRBC SPEC MANUAL: 1 /100 WBC (ref 0–0.2)
OVALOCYTES BLD QL SMEAR: ABNORMAL
PLASMA CELL PREC NFR BLD MANUAL: 1 % (ref 0–0)
PLAT MORPH BLD: NORMAL
PLATELET # BLD AUTO: 413 10*3/MM3 (ref 140–450)
PMV BLD AUTO: 8.5 FL (ref 6–12)
POIKILOCYTOSIS BLD QL SMEAR: ABNORMAL
POLYCHROMASIA BLD QL SMEAR: ABNORMAL
RBC # BLD AUTO: 4.47 10*6/MM3 (ref 3.77–5.28)
SCAN SLIDE: NORMAL
SPHEROCYTES BLD QL SMEAR: ABNORMAL
VARIANT LYMPHS NFR BLD MANUAL: 24 % (ref 19.6–45.3)
WBC MORPH BLD: NORMAL
WBC NRBC COR # BLD: 5.2 10*3/MM3 (ref 3.4–10.8)

## 2023-02-15 PROCEDURE — 99152 MOD SED SAME PHYS/QHP 5/>YRS: CPT | Performed by: INTERNAL MEDICINE

## 2023-02-15 PROCEDURE — 0 LIDOCAINE 1 % SOLUTION: Performed by: INTERNAL MEDICINE

## 2023-02-15 PROCEDURE — C1769 GUIDE WIRE: HCPCS | Performed by: INTERNAL MEDICINE

## 2023-02-15 PROCEDURE — 25010000002 NA FERRIC GLUC CPLX PER 12.5 MG: Performed by: NURSE PRACTITIONER

## 2023-02-15 PROCEDURE — 85025 COMPLETE CBC W/AUTO DIFF WBC: CPT | Performed by: NURSE PRACTITIONER

## 2023-02-15 PROCEDURE — 85007 BL SMEAR W/DIFF WBC COUNT: CPT | Performed by: NURSE PRACTITIONER

## 2023-02-15 PROCEDURE — 4A023N7 MEASUREMENT OF CARDIAC SAMPLING AND PRESSURE, LEFT HEART, PERCUTANEOUS APPROACH: ICD-10-PCS | Performed by: INTERNAL MEDICINE

## 2023-02-15 PROCEDURE — 25010000002 MIDAZOLAM PER 1 MG: Performed by: INTERNAL MEDICINE

## 2023-02-15 PROCEDURE — 99232 SBSQ HOSP IP/OBS MODERATE 35: CPT | Performed by: INTERNAL MEDICINE

## 2023-02-15 PROCEDURE — 93454 CORONARY ARTERY ANGIO S&I: CPT | Performed by: INTERNAL MEDICINE

## 2023-02-15 PROCEDURE — B2111ZZ FLUOROSCOPY OF MULTIPLE CORONARY ARTERIES USING LOW OSMOLAR CONTRAST: ICD-10-PCS | Performed by: INTERNAL MEDICINE

## 2023-02-15 PROCEDURE — 94799 UNLISTED PULMONARY SVC/PX: CPT

## 2023-02-15 PROCEDURE — C1894 INTRO/SHEATH, NON-LASER: HCPCS | Performed by: INTERNAL MEDICINE

## 2023-02-15 PROCEDURE — 94761 N-INVAS EAR/PLS OXIMETRY MLT: CPT

## 2023-02-15 PROCEDURE — G0378 HOSPITAL OBSERVATION PER HR: HCPCS

## 2023-02-15 PROCEDURE — 0 IOPAMIDOL PER 1 ML: Performed by: INTERNAL MEDICINE

## 2023-02-15 PROCEDURE — 99254 IP/OBS CNSLTJ NEW/EST MOD 60: CPT | Performed by: THORACIC SURGERY (CARDIOTHORACIC VASCULAR SURGERY)

## 2023-02-15 PROCEDURE — 25010000002 HEPARIN (PORCINE) PER 1000 UNITS: Performed by: INTERNAL MEDICINE

## 2023-02-15 PROCEDURE — 25010000002 FENTANYL CITRATE (PF) 100 MCG/2ML SOLUTION: Performed by: INTERNAL MEDICINE

## 2023-02-15 RX ORDER — SODIUM CHLORIDE 9 MG/ML
INJECTION, SOLUTION INTRAVENOUS
Status: COMPLETED | OUTPATIENT
Start: 2023-02-15 | End: 2023-02-15

## 2023-02-15 RX ORDER — HEPARIN SODIUM 1000 [USP'U]/ML
INJECTION, SOLUTION INTRAVENOUS; SUBCUTANEOUS
Status: DISCONTINUED | OUTPATIENT
Start: 2023-02-15 | End: 2023-02-15 | Stop reason: HOSPADM

## 2023-02-15 RX ORDER — NITROGLYCERIN 5 MG/ML
INJECTION, SOLUTION INTRAVENOUS
Status: DISCONTINUED | OUTPATIENT
Start: 2023-02-15 | End: 2023-02-15 | Stop reason: HOSPADM

## 2023-02-15 RX ORDER — NICARDIPINE HYDROCHLORIDE 2.5 MG/ML
INJECTION INTRAVENOUS
Status: DISCONTINUED | OUTPATIENT
Start: 2023-02-15 | End: 2023-02-15 | Stop reason: HOSPADM

## 2023-02-15 RX ORDER — FENTANYL CITRATE 50 UG/ML
INJECTION, SOLUTION INTRAMUSCULAR; INTRAVENOUS
Status: DISCONTINUED | OUTPATIENT
Start: 2023-02-15 | End: 2023-02-15 | Stop reason: HOSPADM

## 2023-02-15 RX ORDER — MIDAZOLAM HYDROCHLORIDE 1 MG/ML
INJECTION INTRAMUSCULAR; INTRAVENOUS
Status: DISCONTINUED | OUTPATIENT
Start: 2023-02-15 | End: 2023-02-15 | Stop reason: HOSPADM

## 2023-02-15 RX ORDER — SODIUM CHLORIDE 9 MG/ML
75 INJECTION, SOLUTION INTRAVENOUS CONTINUOUS
Status: DISCONTINUED | OUTPATIENT
Start: 2023-02-15 | End: 2023-02-20

## 2023-02-15 RX ORDER — LIDOCAINE HYDROCHLORIDE 10 MG/ML
INJECTION, SOLUTION INFILTRATION; PERINEURAL
Status: DISCONTINUED | OUTPATIENT
Start: 2023-02-15 | End: 2023-02-15 | Stop reason: HOSPADM

## 2023-02-15 RX ADMIN — Medication 10 ML: at 21:25

## 2023-02-15 RX ADMIN — LAMOTRIGINE 200 MG: 100 TABLET ORAL at 21:25

## 2023-02-15 RX ADMIN — SUCRALFATE 1 G: 1 TABLET ORAL at 21:25

## 2023-02-15 RX ADMIN — LEVOTHYROXINE SODIUM 150 MCG: 0.15 TABLET ORAL at 06:20

## 2023-02-15 RX ADMIN — VENLAFAXINE HYDROCHLORIDE 150 MG: 75 CAPSULE, EXTENDED RELEASE ORAL at 08:33

## 2023-02-15 RX ADMIN — SUCRALFATE 1 G: 1 TABLET ORAL at 08:33

## 2023-02-15 RX ADMIN — SODIUM CHLORIDE 75 ML/HR: 9 INJECTION, SOLUTION INTRAVENOUS at 20:20

## 2023-02-15 RX ADMIN — TRAZODONE HYDROCHLORIDE 100 MG: 100 TABLET ORAL at 21:25

## 2023-02-15 RX ADMIN — BUDESONIDE AND FORMOTEROL FUMARATE DIHYDRATE 2 PUFF: 160; 4.5 AEROSOL RESPIRATORY (INHALATION) at 20:45

## 2023-02-15 RX ADMIN — Medication 3 ML: at 08:37

## 2023-02-15 RX ADMIN — PANTOPRAZOLE SODIUM 40 MG: 40 TABLET, DELAYED RELEASE ORAL at 06:20

## 2023-02-15 RX ADMIN — Medication 3 ML: at 21:26

## 2023-02-15 RX ADMIN — ALPRAZOLAM 0.25 MG: 0.5 TABLET ORAL at 12:40

## 2023-02-15 RX ADMIN — SODIUM CHLORIDE 250 MG: 9 INJECTION, SOLUTION INTRAVENOUS at 08:33

## 2023-02-15 RX ADMIN — SUCRALFATE 1 G: 1 TABLET ORAL at 17:30

## 2023-02-16 ENCOUNTER — APPOINTMENT (OUTPATIENT)
Dept: RESPIRATORY THERAPY | Facility: HOSPITAL | Age: 48
DRG: 216 | End: 2023-02-16
Payer: COMMERCIAL

## 2023-02-16 ENCOUNTER — ANESTHESIA EVENT (OUTPATIENT)
Dept: PERIOP | Facility: HOSPITAL | Age: 48
DRG: 216 | End: 2023-02-16
Payer: COMMERCIAL

## 2023-02-16 ENCOUNTER — TELEPHONE (OUTPATIENT)
Dept: HEMATOLOGY | Age: 48
End: 2023-02-16

## 2023-02-16 PROBLEM — I25.118 CORONARY ARTERY DISEASE OF NATIVE ARTERY OF NATIVE HEART WITH STABLE ANGINA PECTORIS: Status: ACTIVE | Noted: 2023-02-11

## 2023-02-16 LAB
BASOPHILS # BLD AUTO: 0.1 10*3/MM3 (ref 0–0.2)
BASOPHILS NFR BLD AUTO: 1.4 % (ref 0–1.5)
BILIRUB UR QL STRIP: NEGATIVE
CLARITY UR: CLEAR
COLOR UR: YELLOW
COPPER SERPL-MCNC: 194 UG/DL (ref 80–158)
DEPRECATED RDW RBC AUTO: 44.6 FL (ref 37–54)
EOSINOPHIL # BLD AUTO: 0.1 10*3/MM3 (ref 0–0.4)
EOSINOPHIL NFR BLD AUTO: 2.3 % (ref 0.3–6.2)
ERYTHROCYTE [DISTWIDTH] IN BLOOD BY AUTOMATED COUNT: 20.5 % (ref 12.3–15.4)
GLUCOSE UR STRIP-MCNC: NEGATIVE MG/DL
HCT VFR BLD AUTO: 25.3 % (ref 34–46.6)
HGB BLD-MCNC: 7.2 G/DL (ref 12–15.9)
HGB UR QL STRIP.AUTO: NEGATIVE
KETONES UR QL STRIP: NEGATIVE
LEUKOCYTE ESTERASE UR QL STRIP.AUTO: NEGATIVE
LYMPHOCYTES # BLD AUTO: 1.1 10*3/MM3 (ref 0.7–3.1)
LYMPHOCYTES NFR BLD AUTO: 22.5 % (ref 19.6–45.3)
MCH RBC QN AUTO: 17.9 PG (ref 26.6–33)
MCHC RBC AUTO-ENTMCNC: 28.4 G/DL (ref 31.5–35.7)
MCV RBC AUTO: 63.2 FL (ref 79–97)
MONOCYTES # BLD AUTO: 0.4 10*3/MM3 (ref 0.1–0.9)
MONOCYTES NFR BLD AUTO: 8.7 % (ref 5–12)
MRSA DNA SPEC QL NAA+PROBE: NORMAL
NEUTROPHILS NFR BLD AUTO: 3.2 10*3/MM3 (ref 1.7–7)
NEUTROPHILS NFR BLD AUTO: 65.1 % (ref 42.7–76)
NITRITE UR QL STRIP: NEGATIVE
NRBC BLD AUTO-RTO: 0.2 /100 WBC (ref 0–0.2)
PH UR STRIP.AUTO: <=5 [PH] (ref 5–8)
PLATELET # BLD AUTO: 379 10*3/MM3 (ref 140–450)
PMV BLD AUTO: 8.9 FL (ref 6–12)
PROT UR QL STRIP: NEGATIVE
RBC # BLD AUTO: 4.01 10*6/MM3 (ref 3.77–5.28)
SP GR UR STRIP: 1.02 (ref 1–1.03)
UROBILINOGEN UR QL STRIP: NORMAL
WBC NRBC COR # BLD: 5 10*3/MM3 (ref 3.4–10.8)

## 2023-02-16 PROCEDURE — C1751 CATH, INF, PER/CENT/MIDLINE: HCPCS

## 2023-02-16 PROCEDURE — 99232 SBSQ HOSP IP/OBS MODERATE 35: CPT | Performed by: INTERNAL MEDICINE

## 2023-02-16 PROCEDURE — 94729 DIFFUSING CAPACITY: CPT

## 2023-02-16 PROCEDURE — 99232 SBSQ HOSP IP/OBS MODERATE 35: CPT | Performed by: NURSE PRACTITIONER

## 2023-02-16 PROCEDURE — 94799 UNLISTED PULMONARY SVC/PX: CPT

## 2023-02-16 PROCEDURE — 87641 MR-STAPH DNA AMP PROBE: CPT | Performed by: NURSE PRACTITIONER

## 2023-02-16 PROCEDURE — 85025 COMPLETE CBC W/AUTO DIFF WBC: CPT | Performed by: INTERNAL MEDICINE

## 2023-02-16 PROCEDURE — 94060 EVALUATION OF WHEEZING: CPT

## 2023-02-16 PROCEDURE — 94727 GAS DIL/WSHOT DETER LNG VOL: CPT

## 2023-02-16 PROCEDURE — 81003 URINALYSIS AUTO W/O SCOPE: CPT | Performed by: NURSE PRACTITIONER

## 2023-02-16 RX ORDER — CHLORHEXIDINE GLUCONATE 0.12 MG/ML
15 RINSE ORAL EVERY 12 HOURS SCHEDULED
Status: COMPLETED | OUTPATIENT
Start: 2023-02-19 | End: 2023-02-20

## 2023-02-16 RX ORDER — ALBUTEROL SULFATE 90 UG/1
2 AEROSOL, METERED RESPIRATORY (INHALATION) ONCE
Status: COMPLETED | OUTPATIENT
Start: 2023-02-16 | End: 2023-02-16

## 2023-02-16 RX ORDER — VANCOMYCIN 1.75 GRAM/500 ML IN 0.9 % SODIUM CHLORIDE INTRAVENOUS
15 ONCE
Status: COMPLETED | OUTPATIENT
Start: 2023-02-20 | End: 2023-02-20

## 2023-02-16 RX ORDER — ALPRAZOLAM 0.25 MG/1
0.25 TABLET ORAL EVERY 8 HOURS PRN
Status: DISCONTINUED | OUTPATIENT
Start: 2023-02-19 | End: 2023-02-20

## 2023-02-16 RX ORDER — CHLORHEXIDINE GLUCONATE 500 MG/1
1 CLOTH TOPICAL EVERY 12 HOURS
Status: ACTIVE | OUTPATIENT
Start: 2023-02-19 | End: 2023-02-19

## 2023-02-16 RX ORDER — ASPIRIN 81 MG/1
81 TABLET, CHEWABLE ORAL DAILY
Status: DISCONTINUED | OUTPATIENT
Start: 2023-02-16 | End: 2023-02-20

## 2023-02-16 RX ORDER — ATORVASTATIN CALCIUM 20 MG/1
20 TABLET, FILM COATED ORAL NIGHTLY
Status: DISCONTINUED | OUTPATIENT
Start: 2023-02-16 | End: 2023-02-20

## 2023-02-16 RX ADMIN — LAMOTRIGINE 200 MG: 100 TABLET ORAL at 21:06

## 2023-02-16 RX ADMIN — LEVOTHYROXINE SODIUM 150 MCG: 0.15 TABLET ORAL at 06:00

## 2023-02-16 RX ADMIN — SUCRALFATE 1 G: 1 TABLET ORAL at 09:46

## 2023-02-16 RX ADMIN — SUCRALFATE 1 G: 1 TABLET ORAL at 16:34

## 2023-02-16 RX ADMIN — PANTOPRAZOLE SODIUM 40 MG: 40 TABLET, DELAYED RELEASE ORAL at 06:00

## 2023-02-16 RX ADMIN — SUCRALFATE 1 G: 1 TABLET ORAL at 11:54

## 2023-02-16 RX ADMIN — ALBUTEROL SULFATE 2 PUFF: 108 INHALANT RESPIRATORY (INHALATION) at 17:55

## 2023-02-16 RX ADMIN — VENLAFAXINE HYDROCHLORIDE 150 MG: 75 CAPSULE, EXTENDED RELEASE ORAL at 09:46

## 2023-02-16 RX ADMIN — ASPIRIN 81 MG CHEWABLE TABLET 81 MG: 81 TABLET CHEWABLE at 16:34

## 2023-02-16 RX ADMIN — ATORVASTATIN CALCIUM 20 MG: 20 TABLET, FILM COATED ORAL at 21:06

## 2023-02-16 RX ADMIN — SUCRALFATE 1 G: 1 TABLET ORAL at 21:06

## 2023-02-16 RX ADMIN — BUDESONIDE AND FORMOTEROL FUMARATE DIHYDRATE 2 PUFF: 160; 4.5 AEROSOL RESPIRATORY (INHALATION) at 18:50

## 2023-02-16 RX ADMIN — Medication 3 ML: at 21:08

## 2023-02-16 RX ADMIN — MUPIROCIN 1 APPLICATION: 20 OINTMENT TOPICAL at 21:05

## 2023-02-16 RX ADMIN — TRAZODONE HYDROCHLORIDE 100 MG: 100 TABLET ORAL at 21:06

## 2023-02-16 RX ADMIN — ALPRAZOLAM 0.25 MG: 0.5 TABLET ORAL at 14:29

## 2023-02-16 RX ADMIN — Medication 10 ML: at 21:07

## 2023-02-16 RX ADMIN — Medication 3 ML: at 09:47

## 2023-02-16 RX ADMIN — BUDESONIDE AND FORMOTEROL FUMARATE DIHYDRATE 2 PUFF: 160; 4.5 AEROSOL RESPIRATORY (INHALATION) at 08:23
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
## 2023-02-16 NOTE — TELEPHONE ENCOUNTER
Patient called and was visiting relatives in [place] and is in hospital. Rescheduled appts

## 2023-02-17 ENCOUNTER — APPOINTMENT (OUTPATIENT)
Dept: CARDIOLOGY | Facility: HOSPITAL | Age: 48
DRG: 216 | End: 2023-02-17
Payer: COMMERCIAL

## 2023-02-17 LAB
ABO GROUP BLD: NORMAL
ALBUMIN SERPL-MCNC: 4 G/DL (ref 3.5–5.2)
ALBUMIN/GLOB SERPL: 1.1 G/DL
ALP SERPL-CCNC: 91 U/L (ref 39–117)
ALT SERPL W P-5'-P-CCNC: 14 U/L (ref 1–33)
ANION GAP SERPL CALCULATED.3IONS-SCNC: 10 MMOL/L (ref 5–15)
ANISOCYTOSIS BLD QL: NORMAL
APTT PPP: 34.9 SECONDS (ref 24–31)
AST SERPL-CCNC: 21 U/L (ref 1–32)
BASOPHILS # BLD AUTO: 0 10*3/MM3 (ref 0–0.2)
BASOPHILS # BLD AUTO: 0.1 10*3/MM3 (ref 0–0.2)
BASOPHILS NFR BLD AUTO: 1 % (ref 0–1.5)
BASOPHILS NFR BLD AUTO: 1.2 % (ref 0–1.5)
BH CV XLRA MEAS - DIST GSV CALF DIST LEFT: 0.23 CM
BH CV XLRA MEAS - DIST GSV CALF DIST RIGHT: 0.3 CM
BH CV XLRA MEAS - DIST GSV THIGH DIST LEFT: 0.36 CM
BH CV XLRA MEAS - DIST GSV THIGH DIST RIGHT: 0.22 CM
BH CV XLRA MEAS - MID GSV CALF LEFT: 0.27 CM
BH CV XLRA MEAS - MID GSV CALF RIGHT: 0.19 CM
BH CV XLRA MEAS - MID GSV THIGH  LEFT: 0.35 CM
BH CV XLRA MEAS - MID GSV THIGH  RIGHT: 0.17 CM
BH CV XLRA MEAS - PROX GSV CALF DIST LEFT: 0.26 CM
BH CV XLRA MEAS - PROX GSV CALF DIST RIGHT: 0.21 CM
BH CV XLRA MEAS - PROX GSV THIGH  LEFT: 0.39 CM
BH CV XLRA MEAS - PROX GSV THIGH  RIGHT: 0.19 CM
BH CV XLRA MEAS LEFT DIST CCA EDV: -31.7 CM/SEC
BH CV XLRA MEAS LEFT DIST CCA PSV: -79.5 CM/SEC
BH CV XLRA MEAS LEFT DIST ICA EDV: -18.6 CM/SEC
BH CV XLRA MEAS LEFT DIST ICA PSV: -40.4 CM/SEC
BH CV XLRA MEAS LEFT ICA/CCA RATIO: 0.79
BH CV XLRA MEAS LEFT PROX CCA EDV: 23 CM/SEC
BH CV XLRA MEAS LEFT PROX CCA PSV: 89.5 CM/SEC
BH CV XLRA MEAS LEFT PROX ECA EDV: 29.8 CM/SEC
BH CV XLRA MEAS LEFT PROX ECA PSV: 93.8 CM/SEC
BH CV XLRA MEAS LEFT PROX ICA EDV: 27.3 CM/SEC
BH CV XLRA MEAS LEFT PROX ICA PSV: 71.4 CM/SEC
BH CV XLRA MEAS LEFT PROX SCLA PSV: 146 CM/SEC
BH CV XLRA MEAS LEFT VERTEBRAL A PSV: -39.8 CM/SEC
BH CV XLRA MEAS RIGHT DIST CCA EDV: 20.5 CM/SEC
BH CV XLRA MEAS RIGHT DIST CCA PSV: 69.6 CM/SEC
BH CV XLRA MEAS RIGHT DIST ICA EDV: -36 CM/SEC
BH CV XLRA MEAS RIGHT DIST ICA PSV: -80.1 CM/SEC
BH CV XLRA MEAS RIGHT ICA/CCA RATIO: 0.93
BH CV XLRA MEAS RIGHT PROX CCA EDV: 24.9 CM/SEC
BH CV XLRA MEAS RIGHT PROX CCA PSV: 108 CM/SEC
BH CV XLRA MEAS RIGHT PROX ECA PSV: -60.9 CM/SEC
BH CV XLRA MEAS RIGHT PROX ICA EDV: -24.9 CM/SEC
BH CV XLRA MEAS RIGHT PROX ICA PSV: -100 CM/SEC
BH CV XLRA MEAS RIGHT PROX SCLA PSV: 90.7 CM/SEC
BH CV XLRA MEAS RIGHT VERTEBRAL A PSV: -29.8 CM/SEC
BILIRUB SERPL-MCNC: 0.4 MG/DL (ref 0–1.2)
BLD GP AB SCN SERPL QL: NEGATIVE
BUN SERPL-MCNC: 8 MG/DL (ref 6–20)
BUN/CREAT SERPL: 7 (ref 7–25)
CALCIUM SPEC-SCNC: 9.1 MG/DL (ref 8.6–10.5)
CHLORIDE SERPL-SCNC: 102 MMOL/L (ref 98–107)
CHOLEST SERPL-MCNC: 118 MG/DL (ref 0–200)
CO2 SERPL-SCNC: 26 MMOL/L (ref 22–29)
CREAT SERPL-MCNC: 1.15 MG/DL (ref 0.57–1)
DEPRECATED RDW RBC AUTO: 42.9 FL (ref 37–54)
DEPRECATED RDW RBC AUTO: 43.8 FL (ref 37–54)
EGFRCR SERPLBLD CKD-EPI 2021: 58.5 ML/MIN/1.73
ELLIPTOCYTES BLD QL SMEAR: NORMAL
EOSINOPHIL # BLD AUTO: 0.1 10*3/MM3 (ref 0–0.4)
EOSINOPHIL # BLD AUTO: 0.1 10*3/MM3 (ref 0–0.4)
EOSINOPHIL NFR BLD AUTO: 1.5 % (ref 0.3–6.2)
EOSINOPHIL NFR BLD AUTO: 1.7 % (ref 0.3–6.2)
ERYTHROCYTE [DISTWIDTH] IN BLOOD BY AUTOMATED COUNT: 19.9 % (ref 12.3–15.4)
ERYTHROCYTE [DISTWIDTH] IN BLOOD BY AUTOMATED COUNT: 20.1 % (ref 12.3–15.4)
GLOBULIN UR ELPH-MCNC: 3.6 GM/DL
GLUCOSE SERPL-MCNC: 136 MG/DL (ref 65–99)
HBA1C MFR BLD: 5.2 % (ref 3.5–5.6)
HCT VFR BLD AUTO: 24.2 % (ref 34–46.6)
HCT VFR BLD AUTO: 25.5 % (ref 34–46.6)
HDLC SERPL-MCNC: 38 MG/DL (ref 40–60)
HGB BLD-MCNC: 7.2 G/DL (ref 12–15.9)
HGB BLD-MCNC: 7.4 G/DL (ref 12–15.9)
INR PPP: 1.11 (ref 0.93–1.1)
LARGE PLATELETS: NORMAL
LDLC SERPL CALC-MCNC: 61 MG/DL (ref 0–100)
LDLC/HDLC SERPL: 1.59 {RATIO}
LYMPHOCYTES # BLD AUTO: 0.6 10*3/MM3 (ref 0.7–3.1)
LYMPHOCYTES # BLD AUTO: 0.9 10*3/MM3 (ref 0.7–3.1)
LYMPHOCYTES NFR BLD AUTO: 12.2 % (ref 19.6–45.3)
LYMPHOCYTES NFR BLD AUTO: 24 % (ref 19.6–45.3)
MAXIMAL PREDICTED HEART RATE: 171 BPM
MAXIMAL PREDICTED HEART RATE: 171 BPM
MCH RBC QN AUTO: 18.4 PG (ref 26.6–33)
MCH RBC QN AUTO: 18.5 PG (ref 26.6–33)
MCHC RBC AUTO-ENTMCNC: 29.1 G/DL (ref 31.5–35.7)
MCHC RBC AUTO-ENTMCNC: 29.8 G/DL (ref 31.5–35.7)
MCV RBC AUTO: 61.7 FL (ref 79–97)
MCV RBC AUTO: 63.7 FL (ref 79–97)
MICROCYTES BLD QL: NORMAL
MONOCYTES # BLD AUTO: 0.3 10*3/MM3 (ref 0.1–0.9)
MONOCYTES # BLD AUTO: 0.5 10*3/MM3 (ref 0.1–0.9)
MONOCYTES NFR BLD AUTO: 8.1 % (ref 5–12)
MONOCYTES NFR BLD AUTO: 9.9 % (ref 5–12)
NEUTROPHILS NFR BLD AUTO: 2.5 10*3/MM3 (ref 1.7–7)
NEUTROPHILS NFR BLD AUTO: 3.6 10*3/MM3 (ref 1.7–7)
NEUTROPHILS NFR BLD AUTO: 65.4 % (ref 42.7–76)
NEUTROPHILS NFR BLD AUTO: 75 % (ref 42.7–76)
NRBC BLD AUTO-RTO: 0.1 /100 WBC (ref 0–0.2)
NRBC BLD AUTO-RTO: 0.2 /100 WBC (ref 0–0.2)
PLATELET # BLD AUTO: 334 10*3/MM3 (ref 140–450)
PLATELET # BLD AUTO: 350 10*3/MM3 (ref 140–450)
PMV BLD AUTO: 7.8 FL (ref 6–12)
PMV BLD AUTO: 8.6 FL (ref 6–12)
POIKILOCYTOSIS BLD QL SMEAR: NORMAL
POTASSIUM SERPL-SCNC: 4 MMOL/L (ref 3.5–5.2)
PROT SERPL-MCNC: 7.6 G/DL (ref 6–8.5)
PROTHROMBIN TIME: 11.4 SECONDS (ref 9.6–11.7)
RBC # BLD AUTO: 3.91 10*6/MM3 (ref 3.77–5.28)
RBC # BLD AUTO: 4 10*6/MM3 (ref 3.77–5.28)
RH BLD: POSITIVE
SODIUM SERPL-SCNC: 138 MMOL/L (ref 136–145)
SPHEROCYTES BLD QL SMEAR: NORMAL
STRESS TARGET HR: 145 BPM
STRESS TARGET HR: 145 BPM
T&S EXPIRATION DATE: NORMAL
TRIGL SERPL-MCNC: 98 MG/DL (ref 0–150)
VLDLC SERPL-MCNC: 19 MG/DL (ref 5–40)
WBC MORPH BLD: NORMAL
WBC NRBC COR # BLD: 3.8 10*3/MM3 (ref 3.4–10.8)
WBC NRBC COR # BLD: 4.9 10*3/MM3 (ref 3.4–10.8)

## 2023-02-17 PROCEDURE — 80053 COMPREHEN METABOLIC PANEL: CPT | Performed by: NURSE PRACTITIONER

## 2023-02-17 PROCEDURE — 86923 COMPATIBILITY TEST ELECTRIC: CPT

## 2023-02-17 PROCEDURE — 94799 UNLISTED PULMONARY SVC/PX: CPT

## 2023-02-17 PROCEDURE — 85730 THROMBOPLASTIN TIME PARTIAL: CPT | Performed by: NURSE PRACTITIONER

## 2023-02-17 PROCEDURE — 25010000002 ONDANSETRON PER 1 MG: Performed by: INTERNAL MEDICINE

## 2023-02-17 PROCEDURE — 83036 HEMOGLOBIN GLYCOSYLATED A1C: CPT | Performed by: NURSE PRACTITIONER

## 2023-02-17 PROCEDURE — 86900 BLOOD TYPING SEROLOGIC ABO: CPT

## 2023-02-17 PROCEDURE — 93880 EXTRACRANIAL BILAT STUDY: CPT

## 2023-02-17 PROCEDURE — 86900 BLOOD TYPING SEROLOGIC ABO: CPT | Performed by: PHYSICIAN ASSISTANT

## 2023-02-17 PROCEDURE — 99232 SBSQ HOSP IP/OBS MODERATE 35: CPT | Performed by: INTERNAL MEDICINE

## 2023-02-17 PROCEDURE — 93970 EXTREMITY STUDY: CPT

## 2023-02-17 PROCEDURE — 85610 PROTHROMBIN TIME: CPT | Performed by: NURSE PRACTITIONER

## 2023-02-17 PROCEDURE — 86850 RBC ANTIBODY SCREEN: CPT | Performed by: PHYSICIAN ASSISTANT

## 2023-02-17 PROCEDURE — 80061 LIPID PANEL: CPT | Performed by: NURSE PRACTITIONER

## 2023-02-17 PROCEDURE — 86901 BLOOD TYPING SEROLOGIC RH(D): CPT | Performed by: PHYSICIAN ASSISTANT

## 2023-02-17 PROCEDURE — 85007 BL SMEAR W/DIFF WBC COUNT: CPT | Performed by: NURSE PRACTITIONER

## 2023-02-17 PROCEDURE — 99232 SBSQ HOSP IP/OBS MODERATE 35: CPT | Performed by: PHYSICIAN ASSISTANT

## 2023-02-17 PROCEDURE — 94664 DEMO&/EVAL PT USE INHALER: CPT

## 2023-02-17 PROCEDURE — 86901 BLOOD TYPING SEROLOGIC RH(D): CPT

## 2023-02-17 PROCEDURE — 85025 COMPLETE CBC W/AUTO DIFF WBC: CPT | Performed by: INTERNAL MEDICINE

## 2023-02-17 PROCEDURE — 85025 COMPLETE CBC W/AUTO DIFF WBC: CPT | Performed by: NURSE PRACTITIONER

## 2023-02-17 PROCEDURE — 94761 N-INVAS EAR/PLS OXIMETRY MLT: CPT

## 2023-02-17 RX ORDER — METOPROLOL SUCCINATE 25 MG/1
25 TABLET, EXTENDED RELEASE ORAL
Status: DISCONTINUED | OUTPATIENT
Start: 2023-02-17 | End: 2023-02-20

## 2023-02-17 RX ADMIN — METOPROLOL SUCCINATE 25 MG: 25 TABLET, EXTENDED RELEASE ORAL at 12:15

## 2023-02-17 RX ADMIN — BUDESONIDE AND FORMOTEROL FUMARATE DIHYDRATE 2 PUFF: 160; 4.5 AEROSOL RESPIRATORY (INHALATION) at 19:20

## 2023-02-17 RX ADMIN — ACETAMINOPHEN 500 MG: 500 TABLET, FILM COATED ORAL at 14:30

## 2023-02-17 RX ADMIN — ASPIRIN 81 MG CHEWABLE TABLET 81 MG: 81 TABLET CHEWABLE at 08:03

## 2023-02-17 RX ADMIN — LEVOTHYROXINE SODIUM 150 MCG: 0.15 TABLET ORAL at 05:34

## 2023-02-17 RX ADMIN — BUDESONIDE AND FORMOTEROL FUMARATE DIHYDRATE 2 PUFF: 160; 4.5 AEROSOL RESPIRATORY (INHALATION) at 09:12

## 2023-02-17 RX ADMIN — SUCRALFATE 1 G: 1 TABLET ORAL at 16:47

## 2023-02-17 RX ADMIN — Medication 3 ML: at 08:03

## 2023-02-17 RX ADMIN — SUCRALFATE 1 G: 1 TABLET ORAL at 08:03

## 2023-02-17 RX ADMIN — VENLAFAXINE HYDROCHLORIDE 150 MG: 75 CAPSULE, EXTENDED RELEASE ORAL at 08:03

## 2023-02-17 RX ADMIN — TRAZODONE HYDROCHLORIDE 100 MG: 100 TABLET ORAL at 21:34

## 2023-02-17 RX ADMIN — ALPRAZOLAM 0.25 MG: 0.5 TABLET ORAL at 21:34

## 2023-02-17 RX ADMIN — ONDANSETRON 4 MG: 2 INJECTION INTRAMUSCULAR; INTRAVENOUS at 09:16

## 2023-02-17 RX ADMIN — PANTOPRAZOLE SODIUM 40 MG: 40 TABLET, DELAYED RELEASE ORAL at 05:34

## 2023-02-17 RX ADMIN — Medication 3 ML: at 21:34

## 2023-02-17 RX ADMIN — SUCRALFATE 1 G: 1 TABLET ORAL at 12:15

## 2023-02-17 RX ADMIN — MUPIROCIN 1 APPLICATION: 20 OINTMENT TOPICAL at 05:35

## 2023-02-17 RX ADMIN — ATORVASTATIN CALCIUM 20 MG: 20 TABLET, FILM COATED ORAL at 21:34

## 2023-02-17 RX ADMIN — SUCRALFATE 1 G: 1 TABLET ORAL at 21:34

## 2023-02-17 RX ADMIN — LAMOTRIGINE 200 MG: 100 TABLET ORAL at 21:34

## 2023-02-18 LAB
BASOPHILS # BLD AUTO: 0.1 10*3/MM3 (ref 0–0.2)
BASOPHILS NFR BLD AUTO: 2.3 % (ref 0–1.5)
CLOSE TME COLL+ADP + EPINEP PNL BLD: 97 % (ref 86–100)
DEPRECATED RDW RBC AUTO: 44.6 FL (ref 37–54)
EOSINOPHIL # BLD AUTO: 0.1 10*3/MM3 (ref 0–0.4)
EOSINOPHIL NFR BLD AUTO: 2 % (ref 0.3–6.2)
ERYTHROCYTE [DISTWIDTH] IN BLOOD BY AUTOMATED COUNT: 20.2 % (ref 12.3–15.4)
HCT VFR BLD AUTO: 24.4 % (ref 34–46.6)
HCT VFR BLD AUTO: 28.3 % (ref 34–46.6)
HCT VFR BLD AUTO: 31 % (ref 34–46.6)
HGB BLD-MCNC: 7.2 G/DL (ref 12–15.9)
HGB BLD-MCNC: 8.3 G/DL (ref 12–15.9)
HGB BLD-MCNC: 9.4 G/DL (ref 12–15.9)
LYMPHOCYTES # BLD AUTO: 1 10*3/MM3 (ref 0.7–3.1)
LYMPHOCYTES NFR BLD AUTO: 24.3 % (ref 19.6–45.3)
MCH RBC QN AUTO: 18.6 PG (ref 26.6–33)
MCHC RBC AUTO-ENTMCNC: 29.3 G/DL (ref 31.5–35.7)
MCV RBC AUTO: 63.2 FL (ref 79–97)
MONOCYTES # BLD AUTO: 0.4 10*3/MM3 (ref 0.1–0.9)
MONOCYTES NFR BLD AUTO: 8.4 % (ref 5–12)
NEUTROPHILS NFR BLD AUTO: 2.7 10*3/MM3 (ref 1.7–7)
NEUTROPHILS NFR BLD AUTO: 63 % (ref 42.7–76)
NRBC BLD AUTO-RTO: 0.3 /100 WBC (ref 0–0.2)
PLATELET # BLD AUTO: 314 10*3/MM3 (ref 140–450)
PMV BLD AUTO: 8.5 FL (ref 6–12)
RBC # BLD AUTO: 3.86 10*6/MM3 (ref 3.77–5.28)
SARS-COV-2 RNA RESP QL NAA+PROBE: NOT DETECTED
WBC NRBC COR # BLD: 4.3 10*3/MM3 (ref 3.4–10.8)

## 2023-02-18 PROCEDURE — 87635 SARS-COV-2 COVID-19 AMP PRB: CPT | Performed by: INTERNAL MEDICINE

## 2023-02-18 PROCEDURE — 85014 HEMATOCRIT: CPT | Performed by: THORACIC SURGERY (CARDIOTHORACIC VASCULAR SURGERY)

## 2023-02-18 PROCEDURE — P9016 RBC LEUKOCYTES REDUCED: HCPCS

## 2023-02-18 PROCEDURE — 99024 POSTOP FOLLOW-UP VISIT: CPT | Performed by: THORACIC SURGERY (CARDIOTHORACIC VASCULAR SURGERY)

## 2023-02-18 PROCEDURE — 99232 SBSQ HOSP IP/OBS MODERATE 35: CPT | Performed by: INTERNAL MEDICINE

## 2023-02-18 PROCEDURE — 94799 UNLISTED PULMONARY SVC/PX: CPT

## 2023-02-18 PROCEDURE — 36430 TRANSFUSION BLD/BLD COMPNT: CPT

## 2023-02-18 PROCEDURE — 94761 N-INVAS EAR/PLS OXIMETRY MLT: CPT

## 2023-02-18 PROCEDURE — 86900 BLOOD TYPING SEROLOGIC ABO: CPT

## 2023-02-18 PROCEDURE — 85576 BLOOD PLATELET AGGREGATION: CPT | Performed by: NURSE PRACTITIONER

## 2023-02-18 PROCEDURE — 85025 COMPLETE CBC W/AUTO DIFF WBC: CPT | Performed by: INTERNAL MEDICINE

## 2023-02-18 PROCEDURE — 94664 DEMO&/EVAL PT USE INHALER: CPT

## 2023-02-18 PROCEDURE — 85018 HEMOGLOBIN: CPT | Performed by: THORACIC SURGERY (CARDIOTHORACIC VASCULAR SURGERY)

## 2023-02-18 RX ORDER — CYCLOBENZAPRINE HCL 10 MG
10 TABLET ORAL 3 TIMES DAILY PRN
Status: DISCONTINUED | OUTPATIENT
Start: 2023-02-18 | End: 2023-02-20

## 2023-02-18 RX ADMIN — Medication 3 ML: at 20:04

## 2023-02-18 RX ADMIN — CYCLOBENZAPRINE 10 MG: 10 TABLET, FILM COATED ORAL at 20:04

## 2023-02-18 RX ADMIN — ATORVASTATIN CALCIUM 20 MG: 20 TABLET, FILM COATED ORAL at 20:04

## 2023-02-18 RX ADMIN — SUCRALFATE 1 G: 1 TABLET ORAL at 20:04

## 2023-02-18 RX ADMIN — ACETAMINOPHEN 500 MG: 500 TABLET, FILM COATED ORAL at 04:17

## 2023-02-18 RX ADMIN — ACETAMINOPHEN 500 MG: 500 TABLET, FILM COATED ORAL at 17:29

## 2023-02-18 RX ADMIN — ACETAMINOPHEN 500 MG: 500 TABLET, FILM COATED ORAL at 23:51

## 2023-02-18 RX ADMIN — ASPIRIN 81 MG CHEWABLE TABLET 81 MG: 81 TABLET CHEWABLE at 08:11

## 2023-02-18 RX ADMIN — SUCRALFATE 1 G: 1 TABLET ORAL at 08:12

## 2023-02-18 RX ADMIN — ALPRAZOLAM 0.25 MG: 0.5 TABLET ORAL at 15:53

## 2023-02-18 RX ADMIN — Medication 3 ML: at 08:12

## 2023-02-18 RX ADMIN — BUDESONIDE AND FORMOTEROL FUMARATE DIHYDRATE 2 PUFF: 160; 4.5 AEROSOL RESPIRATORY (INHALATION) at 08:30

## 2023-02-18 RX ADMIN — LAMOTRIGINE 200 MG: 100 TABLET ORAL at 20:04

## 2023-02-18 RX ADMIN — METOPROLOL SUCCINATE 25 MG: 25 TABLET, EXTENDED RELEASE ORAL at 08:11

## 2023-02-18 RX ADMIN — SUCRALFATE 1 G: 1 TABLET ORAL at 10:59

## 2023-02-18 RX ADMIN — VENLAFAXINE HYDROCHLORIDE 150 MG: 75 CAPSULE, EXTENDED RELEASE ORAL at 08:11

## 2023-02-18 RX ADMIN — LEVOTHYROXINE SODIUM 150 MCG: 0.15 TABLET ORAL at 06:21

## 2023-02-18 RX ADMIN — TRAZODONE HYDROCHLORIDE 100 MG: 100 TABLET ORAL at 20:04

## 2023-02-18 RX ADMIN — ALPRAZOLAM 0.25 MG: 0.5 TABLET ORAL at 23:51

## 2023-02-18 RX ADMIN — PANTOPRAZOLE SODIUM 40 MG: 40 TABLET, DELAYED RELEASE ORAL at 06:21

## 2023-02-18 RX ADMIN — SUCRALFATE 1 G: 1 TABLET ORAL at 17:27

## 2023-02-18 RX ADMIN — CYCLOBENZAPRINE 10 MG: 10 TABLET, FILM COATED ORAL at 10:59

## 2023-02-18 RX ADMIN — ACETAMINOPHEN 500 MG: 500 TABLET, FILM COATED ORAL at 10:41

## 2023-02-18 RX ADMIN — BUDESONIDE AND FORMOTEROL FUMARATE DIHYDRATE 2 PUFF: 160; 4.5 AEROSOL RESPIRATORY (INHALATION) at 18:59

## 2023-02-19 LAB
ARTERIAL PATENCY WRIST A: POSITIVE
ATMOSPHERIC PRESS: ABNORMAL MM[HG]
B-HCG UR QL: NEGATIVE
BASE EXCESS BLDA CALC-SCNC: -1.6 MMOL/L (ref 0–3)
BASOPHILS # BLD AUTO: 0 10*3/MM3 (ref 0–0.2)
BASOPHILS NFR BLD AUTO: 0.5 % (ref 0–1.5)
BDY SITE: ABNORMAL
BH BB BLOOD EXPIRATION DATE: NORMAL
BH BB BLOOD TYPE BARCODE: 5100
BH BB DISPENSE STATUS: NORMAL
BH BB PRODUCT CODE: NORMAL
BH BB UNIT NUMBER: NORMAL
CO2 BLDA-SCNC: 24.5 MMOL/L (ref 22–29)
CROSSMATCH INTERPRETATION: NORMAL
DEPRECATED RDW RBC AUTO: 65.2 FL (ref 37–54)
EOSINOPHIL # BLD AUTO: 0.1 10*3/MM3 (ref 0–0.4)
EOSINOPHIL NFR BLD AUTO: 1.8 % (ref 0.3–6.2)
ERYTHROCYTE [DISTWIDTH] IN BLOOD BY AUTOMATED COUNT: 29.4 % (ref 12.3–15.4)
HCO3 BLDA-SCNC: 23.3 MMOL/L (ref 21–28)
HCT VFR BLD AUTO: 30 % (ref 34–46.6)
HCT VFR BLD AUTO: 30.1 % (ref 34–46.6)
HEMODILUTION: NO
HGB BLD-MCNC: 8.8 G/DL (ref 12–15.9)
HGB BLD-MCNC: 9.3 G/DL (ref 12–15.9)
INHALED O2 CONCENTRATION: 21 %
LYMPHOCYTES # BLD AUTO: 0.9 10*3/MM3 (ref 0.7–3.1)
LYMPHOCYTES NFR BLD AUTO: 21.2 % (ref 19.6–45.3)
MCH RBC QN AUTO: 20.5 PG (ref 26.6–33)
MCHC RBC AUTO-ENTMCNC: 29.5 G/DL (ref 31.5–35.7)
MCV RBC AUTO: 69.4 FL (ref 79–97)
MODALITY: ABNORMAL
MONOCYTES # BLD AUTO: 0.4 10*3/MM3 (ref 0.1–0.9)
MONOCYTES NFR BLD AUTO: 9.1 % (ref 5–12)
NEUTROPHILS NFR BLD AUTO: 3 10*3/MM3 (ref 1.7–7)
NEUTROPHILS NFR BLD AUTO: 67.4 % (ref 42.7–76)
NRBC BLD AUTO-RTO: 0.4 /100 WBC (ref 0–0.2)
PCO2 BLDA: 38.9 MM HG (ref 35–48)
PH BLDA: 7.38 PH UNITS (ref 7.35–7.45)
PLATELET # BLD AUTO: 299 10*3/MM3 (ref 140–450)
PMV BLD AUTO: 8.6 FL (ref 6–12)
PO2 BLDA: 77.1 MM HG (ref 83–108)
RBC # BLD AUTO: 4.32 10*6/MM3 (ref 3.77–5.28)
SAO2 % BLDCOA: 95.1 % (ref 94–98)
UNIT  ABO: NORMAL
UNIT  RH: NORMAL
WBC NRBC COR # BLD: 4.4 10*3/MM3 (ref 3.4–10.8)

## 2023-02-19 PROCEDURE — 85018 HEMOGLOBIN: CPT | Performed by: THORACIC SURGERY (CARDIOTHORACIC VASCULAR SURGERY)

## 2023-02-19 PROCEDURE — 94799 UNLISTED PULMONARY SVC/PX: CPT

## 2023-02-19 PROCEDURE — 36600 WITHDRAWAL OF ARTERIAL BLOOD: CPT

## 2023-02-19 PROCEDURE — 94761 N-INVAS EAR/PLS OXIMETRY MLT: CPT

## 2023-02-19 PROCEDURE — 82803 BLOOD GASES ANY COMBINATION: CPT

## 2023-02-19 PROCEDURE — 85025 COMPLETE CBC W/AUTO DIFF WBC: CPT | Performed by: INTERNAL MEDICINE

## 2023-02-19 PROCEDURE — 99232 SBSQ HOSP IP/OBS MODERATE 35: CPT | Performed by: INTERNAL MEDICINE

## 2023-02-19 PROCEDURE — 94664 DEMO&/EVAL PT USE INHALER: CPT

## 2023-02-19 PROCEDURE — 99024 POSTOP FOLLOW-UP VISIT: CPT | Performed by: THORACIC SURGERY (CARDIOTHORACIC VASCULAR SURGERY)

## 2023-02-19 PROCEDURE — 81025 URINE PREGNANCY TEST: CPT | Performed by: NURSE PRACTITIONER

## 2023-02-19 PROCEDURE — 25010000002 ONDANSETRON PER 1 MG: Performed by: INTERNAL MEDICINE

## 2023-02-19 PROCEDURE — 85014 HEMATOCRIT: CPT | Performed by: THORACIC SURGERY (CARDIOTHORACIC VASCULAR SURGERY)

## 2023-02-19 PROCEDURE — 25010000002 FUROSEMIDE PER 20 MG: Performed by: THORACIC SURGERY (CARDIOTHORACIC VASCULAR SURGERY)

## 2023-02-19 RX ORDER — POTASSIUM CHLORIDE 20 MEQ/1
20 TABLET, EXTENDED RELEASE ORAL ONCE
Status: COMPLETED | OUTPATIENT
Start: 2023-02-19 | End: 2023-02-19

## 2023-02-19 RX ORDER — FUROSEMIDE 10 MG/ML
20 INJECTION INTRAMUSCULAR; INTRAVENOUS ONCE
Status: COMPLETED | OUTPATIENT
Start: 2023-02-19 | End: 2023-02-19

## 2023-02-19 RX ORDER — LIDOCAINE 50 MG/G
1 PATCH TOPICAL
Status: DISCONTINUED | OUTPATIENT
Start: 2023-02-19 | End: 2023-02-20

## 2023-02-19 RX ADMIN — POTASSIUM CHLORIDE 20 MEQ: 1500 TABLET, EXTENDED RELEASE ORAL at 09:09

## 2023-02-19 RX ADMIN — CHLORHEXIDINE GLUCONATE 0.12% ORAL RINSE 15 ML: 1.2 LIQUID ORAL at 21:18

## 2023-02-19 RX ADMIN — CYCLOBENZAPRINE 10 MG: 10 TABLET, FILM COATED ORAL at 04:24

## 2023-02-19 RX ADMIN — LIDOCAINE 1 PATCH: 50 PATCH CUTANEOUS at 11:47

## 2023-02-19 RX ADMIN — BUDESONIDE AND FORMOTEROL FUMARATE DIHYDRATE 2 PUFF: 160; 4.5 AEROSOL RESPIRATORY (INHALATION) at 19:04

## 2023-02-19 RX ADMIN — ONDANSETRON 4 MG: 2 INJECTION INTRAMUSCULAR; INTRAVENOUS at 19:39

## 2023-02-19 RX ADMIN — LAMOTRIGINE 200 MG: 100 TABLET ORAL at 21:18

## 2023-02-19 RX ADMIN — ACETAMINOPHEN 500 MG: 500 TABLET, FILM COATED ORAL at 15:14

## 2023-02-19 RX ADMIN — FUROSEMIDE 20 MG: 10 INJECTION, SOLUTION INTRAMUSCULAR; INTRAVENOUS at 09:09

## 2023-02-19 RX ADMIN — SUCRALFATE 1 G: 1 TABLET ORAL at 09:10

## 2023-02-19 RX ADMIN — LEVOTHYROXINE SODIUM 150 MCG: 0.15 TABLET ORAL at 06:08

## 2023-02-19 RX ADMIN — SUCRALFATE 1 G: 1 TABLET ORAL at 17:21

## 2023-02-19 RX ADMIN — Medication 3 ML: at 09:11

## 2023-02-19 RX ADMIN — ALPRAZOLAM 0.25 MG: 0.25 TABLET ORAL at 21:18

## 2023-02-19 RX ADMIN — CHLORHEXIDINE GLUCONATE 1 APPLICATION: 500 CLOTH TOPICAL at 00:08

## 2023-02-19 RX ADMIN — Medication 3 ML: at 21:21

## 2023-02-19 RX ADMIN — TRAZODONE HYDROCHLORIDE 100 MG: 100 TABLET ORAL at 21:18

## 2023-02-19 RX ADMIN — CYCLOBENZAPRINE 10 MG: 10 TABLET, FILM COATED ORAL at 12:17

## 2023-02-19 RX ADMIN — PANTOPRAZOLE SODIUM 40 MG: 40 TABLET, DELAYED RELEASE ORAL at 06:08

## 2023-02-19 RX ADMIN — ALPRAZOLAM 0.25 MG: 0.25 TABLET ORAL at 12:55

## 2023-02-19 RX ADMIN — CYCLOBENZAPRINE 10 MG: 10 TABLET, FILM COATED ORAL at 21:18

## 2023-02-19 RX ADMIN — ASPIRIN 81 MG CHEWABLE TABLET 81 MG: 81 TABLET CHEWABLE at 09:10

## 2023-02-19 RX ADMIN — SUCRALFATE 1 G: 1 TABLET ORAL at 21:18

## 2023-02-19 RX ADMIN — ACETAMINOPHEN 500 MG: 500 TABLET, FILM COATED ORAL at 21:18

## 2023-02-19 RX ADMIN — BUDESONIDE AND FORMOTEROL FUMARATE DIHYDRATE 2 PUFF: 160; 4.5 AEROSOL RESPIRATORY (INHALATION) at 09:04

## 2023-02-19 RX ADMIN — ACETAMINOPHEN 500 MG: 500 TABLET, FILM COATED ORAL at 06:08

## 2023-02-19 RX ADMIN — METOPROLOL SUCCINATE 25 MG: 25 TABLET, EXTENDED RELEASE ORAL at 09:09

## 2023-02-19 RX ADMIN — SUCRALFATE 1 G: 1 TABLET ORAL at 11:48

## 2023-02-19 RX ADMIN — VENLAFAXINE HYDROCHLORIDE 150 MG: 75 CAPSULE, EXTENDED RELEASE ORAL at 09:10

## 2023-02-19 RX ADMIN — ATORVASTATIN CALCIUM 20 MG: 20 TABLET, FILM COATED ORAL at 21:18

## 2023-02-20 ENCOUNTER — ANESTHESIA (OUTPATIENT)
Dept: PERIOP | Facility: HOSPITAL | Age: 48
DRG: 216 | End: 2023-02-20
Payer: COMMERCIAL

## 2023-02-20 ENCOUNTER — APPOINTMENT (OUTPATIENT)
Dept: GENERAL RADIOLOGY | Facility: HOSPITAL | Age: 48
DRG: 216 | End: 2023-02-20
Payer: COMMERCIAL

## 2023-02-20 LAB
ACT BLD: 473 SECONDS (ref 89–137)
ALBUMIN SERPL-MCNC: 2.5 G/DL (ref 3.5–5.2)
ANION GAP SERPL CALCULATED.3IONS-SCNC: 9 MMOL/L (ref 5–15)
APTT PPP: 28.8 SECONDS (ref 24–31)
APTT PPP: 31.7 SECONDS (ref 24–31)
ARTERIAL PATENCY WRIST A: ABNORMAL
ARTERIAL PATENCY WRIST A: ABNORMAL
ATMOSPHERIC PRESS: ABNORMAL MM[HG]
ATMOSPHERIC PRESS: ABNORMAL MM[HG]
BASE DEFICIT: ABNORMAL
BASE EXCESS BLDA CALC-SCNC: -0.5 MMOL/L (ref 0–3)
BASE EXCESS BLDA CALC-SCNC: -3.7 MMOL/L (ref 0–3)
BASE EXCESS BLDA CALC-SCNC: 2 MMOL/L (ref 0–3)
BASE EXCESS BLDA CALC-SCNC: 3 MMOL/L (ref 0–3)
BASE EXCESS BLDA CALC-SCNC: 4 MMOL/L (ref 0–3)
BASE EXCESS BLDV CALC-SCNC: ABNORMAL MMOL/L
BASOPHILS # BLD AUTO: 0 10*3/MM3 (ref 0–0.2)
BASOPHILS # BLD AUTO: 0 10*3/MM3 (ref 0–0.2)
BASOPHILS NFR BLD AUTO: 0.4 % (ref 0–1.5)
BASOPHILS NFR BLD AUTO: 0.6 % (ref 0–1.5)
BDY SITE: ABNORMAL
BDY SITE: ABNORMAL
BH BB BLOOD EXPIRATION DATE: NORMAL
BH BB BLOOD TYPE BARCODE: 5100
BH BB DISPENSE STATUS: NORMAL
BH BB PRODUCT CODE: NORMAL
BH BB UNIT NUMBER: NORMAL
BUN SERPL-MCNC: 8 MG/DL (ref 6–20)
BUN/CREAT SERPL: 13.1 (ref 7–25)
CA-I BLDA-SCNC: 1.02 MMOL/L (ref 1.12–1.32)
CA-I BLDA-SCNC: 1.03 MMOL/L (ref 1.12–1.32)
CA-I BLDA-SCNC: 1.07 MMOL/L (ref 1.12–1.32)
CA-I BLDA-SCNC: 1.08 MMOL/L (ref 1.12–1.32)
CA-I BLDA-SCNC: 1.16 MMOL/L (ref 1.12–1.32)
CA-I BLDA-SCNC: 1.19 MMOL/L (ref 1.15–1.33)
CA-I BLDA-SCNC: 1.21 MMOL/L (ref 1.15–1.33)
CA-I BLDA-SCNC: 1.26 MMOL/L (ref 1.12–1.32)
CA-I SERPL ISE-MCNC: 0.93 MMOL/L (ref 1.2–1.3)
CALCIUM SPEC-SCNC: 5.7 MG/DL (ref 8.6–10.5)
CHLORIDE SERPL-SCNC: 118 MMOL/L (ref 98–107)
CLOSE TME COLL+ADP + EPINEP PNL BLD: 96 % (ref 86–100)
CO2 BLDA-SCNC: 29 MMOL/L (ref 23–27)
CO2 BLDA-SCNC: 30 MMOL/L (ref 23–27)
CO2 BLDA-SCNC: 31 MMOL/L (ref 23–27)
CO2 CONTENT VENOUS: ABNORMAL
CO2 SERPL-SCNC: 16 MMOL/L (ref 22–29)
CREAT SERPL-MCNC: 0.61 MG/DL (ref 0.57–1)
CROSSMATCH INTERPRETATION: NORMAL
DEPRECATED RDW RBC AUTO: 72.2 FL (ref 37–54)
DEPRECATED RDW RBC AUTO: 73.1 FL (ref 37–54)
DEPRECATED RDW RBC AUTO: 76.1 FL (ref 37–54)
EGFRCR SERPLBLD CKD-EPI 2021: 109.8 ML/MIN/1.73
EOSINOPHIL # BLD AUTO: 0 10*3/MM3 (ref 0–0.4)
EOSINOPHIL # BLD AUTO: 0.1 10*3/MM3 (ref 0–0.4)
EOSINOPHIL NFR BLD AUTO: 0.2 % (ref 0.3–6.2)
EOSINOPHIL NFR BLD AUTO: 2.2 % (ref 0.3–6.2)
ERYTHROCYTE [DISTWIDTH] IN BLOOD BY AUTOMATED COUNT: 32.1 % (ref 12.3–15.4)
ERYTHROCYTE [DISTWIDTH] IN BLOOD BY AUTOMATED COUNT: 32.3 % (ref 12.3–15.4)
ERYTHROCYTE [DISTWIDTH] IN BLOOD BY AUTOMATED COUNT: 32.8 % (ref 12.3–15.4)
FIBRINOGEN PPP-MCNC: 333 MG/DL (ref 210–450)
GLUCOSE BLDC GLUCOMTR-MCNC: 112 MG/DL (ref 70–105)
GLUCOSE BLDC GLUCOMTR-MCNC: 122 MG/DL (ref 74–100)
GLUCOSE BLDC GLUCOMTR-MCNC: 122 MG/DL (ref 74–100)
GLUCOSE BLDC GLUCOMTR-MCNC: 128 MG/DL (ref 70–105)
GLUCOSE BLDC GLUCOMTR-MCNC: 134 MG/DL (ref 70–105)
GLUCOSE BLDC GLUCOMTR-MCNC: 135 MG/DL (ref 70–105)
GLUCOSE BLDC GLUCOMTR-MCNC: 142 MG/DL (ref 70–105)
GLUCOSE BLDC GLUCOMTR-MCNC: 150 MG/DL (ref 70–105)
GLUCOSE BLDC GLUCOMTR-MCNC: 150 MG/DL (ref 70–105)
GLUCOSE BLDC GLUCOMTR-MCNC: 160 MG/DL (ref 70–105)
GLUCOSE BLDC GLUCOMTR-MCNC: 181 MG/DL (ref 70–105)
GLUCOSE BLDC GLUCOMTR-MCNC: 184 MG/DL (ref 74–100)
GLUCOSE BLDC GLUCOMTR-MCNC: 184 MG/DL (ref 74–100)
GLUCOSE BLDC GLUCOMTR-MCNC: 86 MG/DL (ref 70–105)
GLUCOSE BLDC GLUCOMTR-MCNC: 97 MG/DL (ref 70–105)
GLUCOSE SERPL-MCNC: 87 MG/DL (ref 65–99)
HCO3 BLDA-SCNC: 20.8 MMOL/L (ref 21–28)
HCO3 BLDA-SCNC: 23.3 MMOL/L (ref 21–28)
HCO3 BLDA-SCNC: 27.4 MMOL/L (ref 22–26)
HCO3 BLDA-SCNC: 28.4 MMOL/L (ref 22–26)
HCO3 BLDA-SCNC: 28.5 MMOL/L (ref 22–26)
HCO3 BLDA-SCNC: 29 MMOL/L (ref 22–26)
HCO3 BLDA-SCNC: 29.1 MMOL/L (ref 22–26)
HCO3 BLDV-SCNC: 26.3 MMOL/L (ref 23–28)
HCT VFR BLD AUTO: 21.8 % (ref 34–46.6)
HCT VFR BLD AUTO: 25.3 % (ref 34–46.6)
HCT VFR BLD AUTO: 32.7 % (ref 34–46.6)
HCT VFR BLDA CALC: 26 % (ref 38–51)
HCT VFR BLDA CALC: 27 % (ref 38–51)
HCT VFR BLDA CALC: 29 % (ref 38–51)
HCT VFR BLDA CALC: 34 % (ref 38–51)
HCT VFR BLDA CALC: 36 % (ref 38–51)
HEMODILUTION: NO
HEMODILUTION: YES
HGB BLD-MCNC: 6.5 G/DL (ref 12–15.9)
HGB BLD-MCNC: 7.5 G/DL (ref 12–15.9)
HGB BLD-MCNC: 9.8 G/DL (ref 12–15.9)
HGB BLDA-MCNC: 11.7 G/DL (ref 12–17)
HGB BLDA-MCNC: 12.3 G/DL (ref 12–17)
HGB BLDA-MCNC: 8.8 G/DL (ref 12–17)
HGB BLDA-MCNC: 9.2 G/DL (ref 12–17)
HGB BLDA-MCNC: 9.9 G/DL (ref 12–17)
INHALED O2 CONCENTRATION: 60 %
INHALED O2 CONCENTRATION: 80 %
INR PPP: 1.34 (ref 0.93–1.1)
INR PPP: 1.51 (ref 0.93–1.1)
LYMPHOCYTES # BLD AUTO: 0.3 10*3/MM3 (ref 0.7–3.1)
LYMPHOCYTES # BLD AUTO: 0.7 10*3/MM3 (ref 0.7–3.1)
LYMPHOCYTES NFR BLD AUTO: 17.3 % (ref 19.6–45.3)
LYMPHOCYTES NFR BLD AUTO: 2.9 % (ref 19.6–45.3)
MCH RBC QN AUTO: 20.8 PG (ref 26.6–33)
MCH RBC QN AUTO: 20.8 PG (ref 26.6–33)
MCH RBC QN AUTO: 21.2 PG (ref 26.6–33)
MCHC RBC AUTO-ENTMCNC: 29.6 G/DL (ref 31.5–35.7)
MCHC RBC AUTO-ENTMCNC: 29.8 G/DL (ref 31.5–35.7)
MCHC RBC AUTO-ENTMCNC: 29.9 G/DL (ref 31.5–35.7)
MCV RBC AUTO: 69.8 FL (ref 79–97)
MCV RBC AUTO: 70.2 FL (ref 79–97)
MCV RBC AUTO: 71.3 FL (ref 79–97)
MODALITY: ABNORMAL
MODALITY: ABNORMAL
MONOCYTES # BLD AUTO: 0.3 10*3/MM3 (ref 0.1–0.9)
MONOCYTES # BLD AUTO: 0.5 10*3/MM3 (ref 0.1–0.9)
MONOCYTES NFR BLD AUTO: 4.9 % (ref 5–12)
MONOCYTES NFR BLD AUTO: 7 % (ref 5–12)
NEUTROPHILS NFR BLD AUTO: 3 10*3/MM3 (ref 1.7–7)
NEUTROPHILS NFR BLD AUTO: 72.9 % (ref 42.7–76)
NEUTROPHILS NFR BLD AUTO: 9.5 10*3/MM3 (ref 1.7–7)
NEUTROPHILS NFR BLD AUTO: 91.6 % (ref 42.7–76)
NRBC BLD AUTO-RTO: 0.4 /100 WBC (ref 0–0.2)
NRBC BLD AUTO-RTO: 0.5 /100 WBC (ref 0–0.2)
PCO2 BLDA: 34.8 MM HG (ref 35–48)
PCO2 BLDA: 34.9 MM HG (ref 35–48)
PCO2 BLDA: 43.5 MM HG (ref 35–45)
PCO2 BLDA: 44.3 MM HG (ref 35–45)
PCO2 BLDA: 46.2 MM HG (ref 35–45)
PCO2 BLDA: 50.9 MM HG (ref 35–45)
PCO2 BLDA: 53.6 MM HG (ref 35–45)
PCO2 BLDV: 44.9 MM HG (ref 41–51)
PEEP RESPIRATORY: 8 CM[H2O]
PEEP RESPIRATORY: 8 CM[H2O]
PH BLDA: 7.33 PH UNITS (ref 7.35–7.45)
PH BLDA: 7.36 PH UNITS (ref 7.35–7.45)
PH BLDA: 7.38 PH UNITS (ref 7.35–7.45)
PH BLDA: 7.4 PH UNITS (ref 7.35–7.45)
PH BLDA: 7.41 PH UNITS (ref 7.35–7.45)
PH BLDA: 7.42 PH UNITS (ref 7.35–7.45)
PH BLDA: 7.43 PH UNITS (ref 7.35–7.45)
PH BLDV: 7.38 PH UNITS (ref 7.31–7.41)
PHOSPHATE SERPL-MCNC: 1.5 MG/DL (ref 2.5–4.5)
PLATELET # BLD AUTO: 148 10*3/MM3 (ref 140–450)
PLATELET # BLD AUTO: 186 10*3/MM3 (ref 140–450)
PLATELET # BLD AUTO: 309 10*3/MM3 (ref 140–450)
PMV BLD AUTO: 8.2 FL (ref 6–12)
PMV BLD AUTO: 8.4 FL (ref 6–12)
PMV BLD AUTO: 8.5 FL (ref 6–12)
PO2 BLDA: 165.8 MM HG (ref 83–108)
PO2 BLDA: 168.7 MM HG (ref 83–108)
PO2 BLDA: 365 MM HG (ref 80–105)
PO2 BLDA: 382 MM HG (ref 80–105)
PO2 BLDA: 418 MM HG (ref 80–105)
PO2 BLDA: 418 MM HG (ref 80–105)
PO2 BLDA: 434 MM HG (ref 80–105)
PO2 BLDV: 41 MM HG (ref 35–42)
POTASSIUM BLDA-SCNC: 3.2 MMOL/L (ref 3.5–4.9)
POTASSIUM BLDA-SCNC: 3.7 MMOL/L (ref 3.5–4.5)
POTASSIUM BLDA-SCNC: 3.7 MMOL/L (ref 3.5–4.9)
POTASSIUM BLDA-SCNC: 3.8 MMOL/L (ref 3.5–4.9)
POTASSIUM BLDA-SCNC: 4 MMOL/L (ref 3.5–4.9)
POTASSIUM BLDA-SCNC: 4 MMOL/L (ref 3.5–4.9)
POTASSIUM BLDA-SCNC: 4.1 MMOL/L (ref 3.5–4.5)
POTASSIUM BLDA-SCNC: 4.2 MMOL/L (ref 3.5–4.9)
POTASSIUM SERPL-SCNC: 2.3 MMOL/L (ref 3.5–5.2)
PROTHROMBIN TIME: 13.6 SECONDS (ref 9.6–11.7)
PROTHROMBIN TIME: 15.2 SECONDS (ref 9.6–11.7)
RBC # BLD AUTO: 3.07 10*6/MM3 (ref 3.77–5.28)
RBC # BLD AUTO: 3.6 10*6/MM3 (ref 3.77–5.28)
RBC # BLD AUTO: 4.69 10*6/MM3 (ref 3.77–5.28)
RESPIRATORY RATE: 12
RESPIRATORY RATE: 12
SAO2 % BLDCOA: 100 % (ref 95–98)
SAO2 % BLDCOA: 99.5 % (ref 94–98)
SAO2 % BLDCOA: 99.6 % (ref 94–98)
SAO2 % BLDCOV: 28 % (ref 45–75)
SODIUM BLD-SCNC: 137 MMOL/L (ref 138–146)
SODIUM BLD-SCNC: 138 MMOL/L (ref 138–146)
SODIUM BLD-SCNC: 140 MMOL/L (ref 138–146)
SODIUM BLD-SCNC: 141 MMOL/L (ref 138–146)
SODIUM SERPL-SCNC: 143 MMOL/L (ref 136–145)
UNIT  ABO: NORMAL
UNIT  RH: NORMAL
VENTILATOR MODE: ABNORMAL
VENTILATOR MODE: ABNORMAL
VT ON VENT VENT: 800 ML
VT ON VENT VENT: 800 ML
WBC NRBC COR # BLD: 10.4 10*3/MM3 (ref 3.4–10.8)
WBC NRBC COR # BLD: 12.5 10*3/MM3 (ref 3.4–10.8)
WBC NRBC COR # BLD: 4.2 10*3/MM3 (ref 3.4–10.8)

## 2023-02-20 PROCEDURE — 85610 PROTHROMBIN TIME: CPT | Performed by: NURSE PRACTITIONER

## 2023-02-20 PROCEDURE — 25010000002 VANCOMYCIN 10 G RECONSTITUTED SOLUTION: Performed by: NURSE PRACTITIONER

## 2023-02-20 PROCEDURE — C1713 ANCHOR/SCREW BN/BN,TIS/BN: HCPCS | Performed by: THORACIC SURGERY (CARDIOTHORACIC VASCULAR SURGERY)

## 2023-02-20 PROCEDURE — P9016 RBC LEUKOCYTES REDUCED: HCPCS

## 2023-02-20 PROCEDURE — 85610 PROTHROMBIN TIME: CPT | Performed by: THORACIC SURGERY (CARDIOTHORACIC VASCULAR SURGERY)

## 2023-02-20 PROCEDURE — 82962 GLUCOSE BLOOD TEST: CPT

## 2023-02-20 PROCEDURE — C1900 LEAD, CORONARY VENOUS: HCPCS | Performed by: THORACIC SURGERY (CARDIOTHORACIC VASCULAR SURGERY)

## 2023-02-20 PROCEDURE — 85347 COAGULATION TIME ACTIVATED: CPT

## 2023-02-20 PROCEDURE — 25010000002 MIDAZOLAM PER 1 MG: Performed by: ANESTHESIOLOGY

## 2023-02-20 PROCEDURE — 82330 ASSAY OF CALCIUM: CPT | Performed by: NURSE PRACTITIONER

## 2023-02-20 PROCEDURE — 94002 VENT MGMT INPAT INIT DAY: CPT

## 2023-02-20 PROCEDURE — 07DR3ZX EXTRACTION OF ILIAC BONE MARROW, PERCUTANEOUS APPROACH, DIAGNOSTIC: ICD-10-PCS | Performed by: THORACIC SURGERY (CARDIOTHORACIC VASCULAR SURGERY)

## 2023-02-20 PROCEDURE — 0 MILRINONE LACTATE IN DEXTROSE 20-5 MG/100ML-% SOLUTION: Performed by: ANESTHESIOLOGY

## 2023-02-20 PROCEDURE — 88237 TISSUE CULTURE BONE MARROW: CPT

## 2023-02-20 PROCEDURE — 86900 BLOOD TYPING SEROLOGIC ABO: CPT

## 2023-02-20 PROCEDURE — 25010000002 CEFAZOLIN PER 500 MG: Performed by: ANESTHESIOLOGY

## 2023-02-20 PROCEDURE — C1729 CATH, DRAINAGE: HCPCS | Performed by: THORACIC SURGERY (CARDIOTHORACIC VASCULAR SURGERY)

## 2023-02-20 PROCEDURE — 25010000002 HEPARIN (PORCINE) PER 1000 UNITS: Performed by: ANESTHESIOLOGY

## 2023-02-20 PROCEDURE — 94761 N-INVAS EAR/PLS OXIMETRY MLT: CPT

## 2023-02-20 PROCEDURE — 25010000002 PHENYLEPHRINE 10 MG/ML SOLUTION: Performed by: ANESTHESIOLOGY

## 2023-02-20 PROCEDURE — 80069 RENAL FUNCTION PANEL: CPT | Performed by: NURSE PRACTITIONER

## 2023-02-20 PROCEDURE — 88305 TISSUE EXAM BY PATHOLOGIST: CPT | Performed by: THORACIC SURGERY (CARDIOTHORACIC VASCULAR SURGERY)

## 2023-02-20 PROCEDURE — 33641 REPAIR HEART SEPTUM DEFECT: CPT

## 2023-02-20 PROCEDURE — 25010000002 EPINEPHRINE 1 MG/10ML SOLUTION PREFILLED SYRINGE: Performed by: ANESTHESIOLOGY

## 2023-02-20 PROCEDURE — 94664 DEMO&/EVAL PT USE INHALER: CPT

## 2023-02-20 PROCEDURE — 88264 CHROMOSOME ANALYSIS 20-25: CPT

## 2023-02-20 PROCEDURE — 38221 DX BONE MARROW BIOPSIES: CPT

## 2023-02-20 PROCEDURE — 25010000002 SUCCINYLCHOLINE PER 20 MG: Performed by: ANESTHESIOLOGY

## 2023-02-20 PROCEDURE — 33533 CABG ARTERIAL SINGLE: CPT | Performed by: THORACIC SURGERY (CARDIOTHORACIC VASCULAR SURGERY)

## 2023-02-20 PROCEDURE — 93005 ELECTROCARDIOGRAM TRACING: CPT | Performed by: NURSE PRACTITIONER

## 2023-02-20 PROCEDURE — 80051 ELECTROLYTE PANEL: CPT

## 2023-02-20 PROCEDURE — 25010000002 MAGNESIUM SULFATE PER 500 MG OF MAGNESIUM: Performed by: ANESTHESIOLOGY

## 2023-02-20 PROCEDURE — 0 MILRINONE LACTATE IN DEXTROSE 20-5 MG/100ML-% SOLUTION: Performed by: THORACIC SURGERY (CARDIOTHORACIC VASCULAR SURGERY)

## 2023-02-20 PROCEDURE — 82947 ASSAY GLUCOSE BLOOD QUANT: CPT

## 2023-02-20 PROCEDURE — 85025 COMPLETE CBC W/AUTO DIFF WBC: CPT | Performed by: NURSE PRACTITIONER

## 2023-02-20 PROCEDURE — 94799 UNLISTED PULMONARY SVC/PX: CPT

## 2023-02-20 PROCEDURE — 36430 TRANSFUSION BLD/BLD COMPNT: CPT

## 2023-02-20 PROCEDURE — 02100Z9 BYPASS CORONARY ARTERY, ONE ARTERY FROM LEFT INTERNAL MAMMARY, OPEN APPROACH: ICD-10-PCS | Performed by: THORACIC SURGERY (CARDIOTHORACIC VASCULAR SURGERY)

## 2023-02-20 PROCEDURE — 86923 COMPATIBILITY TEST ELECTRIC: CPT

## 2023-02-20 PROCEDURE — 63710000001 INSULIN REGULAR HUMAN PER 5 UNITS: Performed by: ANESTHESIOLOGY

## 2023-02-20 PROCEDURE — 74018 RADEX ABDOMEN 1 VIEW: CPT

## 2023-02-20 PROCEDURE — 02Q50ZZ REPAIR ATRIAL SEPTUM, OPEN APPROACH: ICD-10-PCS | Performed by: THORACIC SURGERY (CARDIOTHORACIC VASCULAR SURGERY)

## 2023-02-20 PROCEDURE — 25010000002 HEPARIN (PORCINE) PER 1000 UNITS: Performed by: THORACIC SURGERY (CARDIOTHORACIC VASCULAR SURGERY)

## 2023-02-20 PROCEDURE — P9041 ALBUMIN (HUMAN),5%, 50ML: HCPCS | Performed by: NURSE PRACTITIONER

## 2023-02-20 PROCEDURE — 02RG0JZ REPLACEMENT OF MITRAL VALVE WITH SYNTHETIC SUBSTITUTE, OPEN APPROACH: ICD-10-PCS | Performed by: THORACIC SURGERY (CARDIOTHORACIC VASCULAR SURGERY)

## 2023-02-20 PROCEDURE — 33641 REPAIR HEART SEPTUM DEFECT: CPT | Performed by: THORACIC SURGERY (CARDIOTHORACIC VASCULAR SURGERY)

## 2023-02-20 PROCEDURE — 71045 X-RAY EXAM CHEST 1 VIEW: CPT

## 2023-02-20 PROCEDURE — 85730 THROMBOPLASTIN TIME PARTIAL: CPT | Performed by: THORACIC SURGERY (CARDIOTHORACIC VASCULAR SURGERY)

## 2023-02-20 PROCEDURE — 85018 HEMOGLOBIN: CPT

## 2023-02-20 PROCEDURE — C1751 CATH, INF, PER/CENT/MIDLINE: HCPCS | Performed by: ANESTHESIOLOGY

## 2023-02-20 PROCEDURE — 25010000002 ONDANSETRON PER 1 MG: Performed by: ANESTHESIOLOGY

## 2023-02-20 PROCEDURE — 85730 THROMBOPLASTIN TIME PARTIAL: CPT | Performed by: NURSE PRACTITIONER

## 2023-02-20 PROCEDURE — 25010000002 EPINEPHRINE 1 MG/ML SOLUTION 30 ML VIAL: Performed by: ANESTHESIOLOGY

## 2023-02-20 PROCEDURE — 25010000002 VANCOMYCIN 1 G RECONSTITUTED SOLUTION 1 EACH VIAL: Performed by: THORACIC SURGERY (CARDIOTHORACIC VASCULAR SURGERY)

## 2023-02-20 PROCEDURE — 82803 BLOOD GASES ANY COMBINATION: CPT

## 2023-02-20 PROCEDURE — 84132 ASSAY OF SERUM POTASSIUM: CPT

## 2023-02-20 PROCEDURE — 25010000002 ALBUMIN HUMAN 5% PER 50 ML: Performed by: NURSE PRACTITIONER

## 2023-02-20 PROCEDURE — 25010000002 ONDANSETRON PER 1 MG: Performed by: INTERNAL MEDICINE

## 2023-02-20 PROCEDURE — 25010000002 FENTANYL CITRATE (PF) 250 MCG/5ML SOLUTION: Performed by: ANESTHESIOLOGY

## 2023-02-20 PROCEDURE — 84295 ASSAY OF SERUM SODIUM: CPT

## 2023-02-20 PROCEDURE — 33430 REPLACEMENT OF MITRAL VALVE: CPT | Performed by: THORACIC SURGERY (CARDIOTHORACIC VASCULAR SURGERY)

## 2023-02-20 PROCEDURE — 85025 COMPLETE CBC W/AUTO DIFF WBC: CPT | Performed by: INTERNAL MEDICINE

## 2023-02-20 PROCEDURE — 25010000002 HYDROCORTISONE SOD SUC (PF) 100 MG RECONSTITUTED SOLUTION: Performed by: ANESTHESIOLOGY

## 2023-02-20 PROCEDURE — 33430 REPLACEMENT OF MITRAL VALVE: CPT

## 2023-02-20 PROCEDURE — 25010000002 PROTAMINE SULFATE PER 10 MG: Performed by: ANESTHESIOLOGY

## 2023-02-20 PROCEDURE — 33533 CABG ARTERIAL SINGLE: CPT

## 2023-02-20 PROCEDURE — 38221 DX BONE MARROW BIOPSIES: CPT | Performed by: THORACIC SURGERY (CARDIOTHORACIC VASCULAR SURGERY)

## 2023-02-20 PROCEDURE — 93010 ELECTROCARDIOGRAM REPORT: CPT | Performed by: INTERNAL MEDICINE

## 2023-02-20 PROCEDURE — 99232 SBSQ HOSP IP/OBS MODERATE 35: CPT | Performed by: INTERNAL MEDICINE

## 2023-02-20 PROCEDURE — 25010000002 MORPHINE PER 10 MG: Performed by: NURSE PRACTITIONER

## 2023-02-20 PROCEDURE — 85576 BLOOD PLATELET AGGREGATION: CPT | Performed by: THORACIC SURGERY (CARDIOTHORACIC VASCULAR SURGERY)

## 2023-02-20 PROCEDURE — 88185 FLOWCYTOMETRY/TC ADD-ON: CPT

## 2023-02-20 PROCEDURE — 88313 SPECIAL STAINS GROUP 2: CPT | Performed by: THORACIC SURGERY (CARDIOTHORACIC VASCULAR SURGERY)

## 2023-02-20 PROCEDURE — 82330 ASSAY OF CALCIUM: CPT

## 2023-02-20 PROCEDURE — 85014 HEMATOCRIT: CPT

## 2023-02-20 PROCEDURE — C1889 IMPLANT/INSERT DEVICE, NOC: HCPCS | Performed by: THORACIC SURGERY (CARDIOTHORACIC VASCULAR SURGERY)

## 2023-02-20 PROCEDURE — 85027 COMPLETE CBC AUTOMATED: CPT | Performed by: THORACIC SURGERY (CARDIOTHORACIC VASCULAR SURGERY)

## 2023-02-20 PROCEDURE — 25010000002 PAPAVERINE PER 60 MG: Performed by: THORACIC SURGERY (CARDIOTHORACIC VASCULAR SURGERY)

## 2023-02-20 PROCEDURE — 93318 ECHO TRANSESOPHAGEAL INTRAOP: CPT | Performed by: ANESTHESIOLOGY

## 2023-02-20 PROCEDURE — 88184 FLOWCYTOMETRY/ TC 1 MARKER: CPT

## 2023-02-20 PROCEDURE — 85384 FIBRINOGEN ACTIVITY: CPT | Performed by: THORACIC SURGERY (CARDIOTHORACIC VASCULAR SURGERY)

## 2023-02-20 DEVICE — IMPLANTABLE DEVICE: Type: IMPLANTABLE DEVICE | Site: HEART | Status: FUNCTIONAL

## 2023-02-20 DEVICE — DEV CONTRL TISS STRATAFIX SPIRAL MNCRYL UD 3/0 PLS 30CM: Type: IMPLANTABLE DEVICE | Site: CHEST | Status: FUNCTIONAL

## 2023-02-20 DEVICE — COR-KNOT MINI® COMBO KITBASE PACKAGE TYPE - KITEACH STERILE PACKAGE KIT CONTAINS (2) SINGLE PATIENT USE COR-KNOT MINI® DEVICES AND (12) COR-KNOT® QUICK LOADS®.
Type: IMPLANTABLE DEVICE | Site: HEART | Status: FUNCTIONAL
Brand: COR-KNOT MINI®

## 2023-02-20 DEVICE — SS SUTURE, 6 PER SLEEVE
Type: IMPLANTABLE DEVICE | Site: STERNUM | Status: FUNCTIONAL
Brand: MYO/WIRE II

## 2023-02-20 DEVICE — COR-KNOT® QUICK LOAD® SINGLES
Type: IMPLANTABLE DEVICE | Site: HEART | Status: FUNCTIONAL
Brand: COR-KNOT® QUICK LOAD®

## 2023-02-20 DEVICE — WAX,BONE,NATURAL
Type: IMPLANTABLE DEVICE | Site: STERNUM | Status: FUNCTIONAL
Brand: MEDLINE INDUSTRIES

## 2023-02-20 DEVICE — ABSORBABLE HEMOSTAT (OXIDIZED REGENERATED CELLULOSE, U.S.P.)
Type: IMPLANTABLE DEVICE | Site: MEDIASTINUM | Status: FUNCTIONAL
Brand: SURGICEL

## 2023-02-20 DEVICE — INCISIONLINE PLEDGET TFLN SFT LG: Type: IMPLANTABLE DEVICE | Site: MEDIASTINUM | Status: FUNCTIONAL

## 2023-02-20 DEVICE — SS SUTURE, 3 PER SLEEVE
Type: IMPLANTABLE DEVICE | Site: STERNUM | Status: FUNCTIONAL
Brand: MYO/WIRE II

## 2023-02-20 RX ORDER — ENOXAPARIN SODIUM 100 MG/ML
40 INJECTION SUBCUTANEOUS DAILY
Status: DISCONTINUED | OUTPATIENT
Start: 2023-02-21 | End: 2023-02-23

## 2023-02-20 RX ORDER — ACETAMINOPHEN 160 MG/5ML
650 SOLUTION ORAL EVERY 4 HOURS PRN
Status: DISCONTINUED | OUTPATIENT
Start: 2023-02-21 | End: 2023-03-01 | Stop reason: HOSPADM

## 2023-02-20 RX ORDER — ACETAMINOPHEN 10 MG/ML
1000 INJECTION, SOLUTION INTRAVENOUS EVERY 8 HOURS
Status: COMPLETED | OUTPATIENT
Start: 2023-02-21 | End: 2023-02-21

## 2023-02-20 RX ORDER — ACETAMINOPHEN 10 MG/ML
INJECTION, SOLUTION INTRAVENOUS AS NEEDED
Status: DISCONTINUED | OUTPATIENT
Start: 2023-02-20 | End: 2023-02-20 | Stop reason: SURG

## 2023-02-20 RX ORDER — ALBUMIN, HUMAN INJ 5% 5 %
12.5 SOLUTION INTRAVENOUS AS NEEDED
Status: COMPLETED | OUTPATIENT
Start: 2023-02-20 | End: 2023-02-21

## 2023-02-20 RX ORDER — POTASSIUM CHLORIDE 29.8 MG/ML
20 INJECTION INTRAVENOUS
Status: DISCONTINUED | OUTPATIENT
Start: 2023-02-20 | End: 2023-02-21

## 2023-02-20 RX ORDER — AMOXICILLIN 250 MG
2 CAPSULE ORAL 2 TIMES DAILY
Status: DISCONTINUED | OUTPATIENT
Start: 2023-02-20 | End: 2023-03-01 | Stop reason: HOSPADM

## 2023-02-20 RX ORDER — POLYETHYLENE GLYCOL 3350 17 G/17G
17 POWDER, FOR SOLUTION ORAL 2 TIMES DAILY
Status: DISCONTINUED | OUTPATIENT
Start: 2023-02-21 | End: 2023-03-01 | Stop reason: HOSPADM

## 2023-02-20 RX ORDER — EPHEDRINE SULFATE 5 MG/ML
INJECTION INTRAVENOUS AS NEEDED
Status: DISCONTINUED | OUTPATIENT
Start: 2023-02-20 | End: 2023-02-20 | Stop reason: SURG

## 2023-02-20 RX ORDER — MAGNESIUM HYDROXIDE 1200 MG/15ML
LIQUID ORAL AS NEEDED
Status: DISCONTINUED | OUTPATIENT
Start: 2023-02-20 | End: 2023-02-20 | Stop reason: HOSPADM

## 2023-02-20 RX ORDER — EPINEPHRINE 0.1 MG/ML
SYRINGE (ML) INJECTION AS NEEDED
Status: DISCONTINUED | OUTPATIENT
Start: 2023-02-20 | End: 2023-02-20 | Stop reason: SURG

## 2023-02-20 RX ORDER — POTASSIUM CHLORIDE 750 MG/1
40 TABLET, FILM COATED, EXTENDED RELEASE ORAL AS NEEDED
Status: DISCONTINUED | OUTPATIENT
Start: 2023-02-20 | End: 2023-03-01 | Stop reason: HOSPADM

## 2023-02-20 RX ORDER — FENTANYL CITRATE 50 UG/ML
INJECTION, SOLUTION INTRAMUSCULAR; INTRAVENOUS AS NEEDED
Status: DISCONTINUED | OUTPATIENT
Start: 2023-02-20 | End: 2023-02-20 | Stop reason: SURG

## 2023-02-20 RX ORDER — SODIUM CHLORIDE 9 MG/ML
30 INJECTION, SOLUTION INTRAVENOUS CONTINUOUS
Status: DISCONTINUED | OUTPATIENT
Start: 2023-02-20 | End: 2023-02-22

## 2023-02-20 RX ORDER — POTASSIUM CHLORIDE 1.5 G/1.77G
40 POWDER, FOR SOLUTION ORAL AS NEEDED
Status: DISCONTINUED | OUTPATIENT
Start: 2023-02-20 | End: 2023-03-01 | Stop reason: HOSPADM

## 2023-02-20 RX ORDER — DEXTROSE MONOHYDRATE 25 G/50ML
10-50 INJECTION, SOLUTION INTRAVENOUS
Status: DISCONTINUED | OUTPATIENT
Start: 2023-02-20 | End: 2023-02-20 | Stop reason: HOSPADM

## 2023-02-20 RX ORDER — NALOXONE HCL 0.4 MG/ML
0.4 VIAL (ML) INJECTION
Status: DISCONTINUED | OUTPATIENT
Start: 2023-02-20 | End: 2023-02-22

## 2023-02-20 RX ORDER — NICOTINE POLACRILEX 4 MG
15 LOZENGE BUCCAL
Status: DISCONTINUED | OUTPATIENT
Start: 2023-02-20 | End: 2023-02-22

## 2023-02-20 RX ORDER — PANTOPRAZOLE SODIUM 40 MG/1
40 TABLET, DELAYED RELEASE ORAL
Status: DISCONTINUED | OUTPATIENT
Start: 2023-02-21 | End: 2023-03-01 | Stop reason: HOSPADM

## 2023-02-20 RX ORDER — SODIUM CHLORIDE 9 MG/ML
INJECTION, SOLUTION INTRAVENOUS AS NEEDED
Status: DISCONTINUED | OUTPATIENT
Start: 2023-02-20 | End: 2023-02-20 | Stop reason: HOSPADM

## 2023-02-20 RX ORDER — OLANZAPINE 10 MG/2ML
1 INJECTION, POWDER, LYOPHILIZED, FOR SOLUTION INTRAMUSCULAR
Status: DISCONTINUED | OUTPATIENT
Start: 2023-02-20 | End: 2023-02-20 | Stop reason: HOSPADM

## 2023-02-20 RX ORDER — PHENYLEPHRINE HYDROCHLORIDE 10 MG/ML
INJECTION INTRAVENOUS AS NEEDED
Status: DISCONTINUED | OUTPATIENT
Start: 2023-02-20 | End: 2023-02-20 | Stop reason: SURG

## 2023-02-20 RX ORDER — ASPIRIN 81 MG/1
81 TABLET ORAL DAILY
Status: DISCONTINUED | OUTPATIENT
Start: 2023-02-21 | End: 2023-03-01 | Stop reason: HOSPADM

## 2023-02-20 RX ORDER — SODIUM CHLORIDE 9 MG/ML
30 INJECTION, SOLUTION INTRAVENOUS CONTINUOUS PRN
Status: DISCONTINUED | OUTPATIENT
Start: 2023-02-20 | End: 2023-02-24

## 2023-02-20 RX ORDER — ATORVASTATIN CALCIUM 40 MG/1
40 TABLET, FILM COATED ORAL NIGHTLY
Status: DISCONTINUED | OUTPATIENT
Start: 2023-02-21 | End: 2023-03-01 | Stop reason: HOSPADM

## 2023-02-20 RX ORDER — DEXMEDETOMIDINE HYDROCHLORIDE 4 UG/ML
.2-1.5 INJECTION, SOLUTION INTRAVENOUS
Status: DISCONTINUED | OUTPATIENT
Start: 2023-02-20 | End: 2023-02-21

## 2023-02-20 RX ORDER — ACETAMINOPHEN 650 MG
TABLET, EXTENDED RELEASE ORAL AS NEEDED
Status: DISCONTINUED | OUTPATIENT
Start: 2023-02-20 | End: 2023-02-20 | Stop reason: HOSPADM

## 2023-02-20 RX ORDER — SUCCINYLCHOLINE CHLORIDE 20 MG/ML
INJECTION INTRAMUSCULAR; INTRAVENOUS AS NEEDED
Status: DISCONTINUED | OUTPATIENT
Start: 2023-02-20 | End: 2023-02-20 | Stop reason: SURG

## 2023-02-20 RX ORDER — MILRINONE LACTATE 0.2 MG/ML
0.12 INJECTION, SOLUTION INTRAVENOUS
Status: DISCONTINUED | OUTPATIENT
Start: 2023-02-20 | End: 2023-02-22

## 2023-02-20 RX ORDER — NICOTINE POLACRILEX 4 MG
15 LOZENGE BUCCAL
Status: DISCONTINUED | OUTPATIENT
Start: 2023-02-20 | End: 2023-02-20 | Stop reason: HOSPADM

## 2023-02-20 RX ORDER — ACETAMINOPHEN 650 MG/1
650 SUPPOSITORY RECTAL EVERY 4 HOURS PRN
Status: DISCONTINUED | OUTPATIENT
Start: 2023-02-21 | End: 2023-03-01 | Stop reason: HOSPADM

## 2023-02-20 RX ORDER — MAGNESIUM SULFATE HEPTAHYDRATE 500 MG/ML
INJECTION, SOLUTION INTRAMUSCULAR; INTRAVENOUS AS NEEDED
Status: DISCONTINUED | OUTPATIENT
Start: 2023-02-20 | End: 2023-02-20 | Stop reason: SURG

## 2023-02-20 RX ORDER — MAGNESIUM SULFATE 1 G/100ML
1 INJECTION INTRAVENOUS EVERY 8 HOURS
Status: COMPLETED | OUTPATIENT
Start: 2023-02-21 | End: 2023-02-21

## 2023-02-20 RX ORDER — AMINOCAPROIC ACID 250 MG/ML
INJECTION, SOLUTION INTRAVENOUS AS NEEDED
Status: DISCONTINUED | OUTPATIENT
Start: 2023-02-20 | End: 2023-02-20 | Stop reason: SURG

## 2023-02-20 RX ORDER — ONDANSETRON 2 MG/ML
INJECTION INTRAMUSCULAR; INTRAVENOUS AS NEEDED
Status: DISCONTINUED | OUTPATIENT
Start: 2023-02-20 | End: 2023-02-20 | Stop reason: SURG

## 2023-02-20 RX ORDER — ASPIRIN 325 MG
325 TABLET ORAL ONCE
Status: DISCONTINUED | OUTPATIENT
Start: 2023-02-20 | End: 2023-03-01 | Stop reason: HOSPADM

## 2023-02-20 RX ORDER — NOREPINEPHRINE BIT/0.9 % NACL 8 MG/250ML
INFUSION BOTTLE (ML) INTRAVENOUS CONTINUOUS PRN
Status: DISCONTINUED | OUTPATIENT
Start: 2023-02-20 | End: 2023-02-20 | Stop reason: SURG

## 2023-02-20 RX ORDER — NITROGLYCERIN 20 MG/100ML
INJECTION INTRAVENOUS AS NEEDED
Status: DISCONTINUED | OUTPATIENT
Start: 2023-02-20 | End: 2023-02-20 | Stop reason: SURG

## 2023-02-20 RX ORDER — ACETAMINOPHEN 325 MG/1
650 TABLET ORAL EVERY 4 HOURS PRN
Status: DISCONTINUED | OUTPATIENT
Start: 2023-02-21 | End: 2023-03-01 | Stop reason: HOSPADM

## 2023-02-20 RX ORDER — KETAMINE HCL IN NACL, ISO-OSM 100MG/10ML
SYRINGE (ML) INJECTION AS NEEDED
Status: DISCONTINUED | OUTPATIENT
Start: 2023-02-20 | End: 2023-02-20 | Stop reason: SURG

## 2023-02-20 RX ORDER — PANTOPRAZOLE SODIUM 40 MG/10ML
40 INJECTION, POWDER, LYOPHILIZED, FOR SOLUTION INTRAVENOUS ONCE
Status: COMPLETED | OUTPATIENT
Start: 2023-02-20 | End: 2023-02-20

## 2023-02-20 RX ORDER — VECURONIUM BROMIDE 1 MG/ML
INJECTION, POWDER, LYOPHILIZED, FOR SOLUTION INTRAVENOUS AS NEEDED
Status: DISCONTINUED | OUTPATIENT
Start: 2023-02-20 | End: 2023-02-20 | Stop reason: SURG

## 2023-02-20 RX ORDER — NITROGLYCERIN 20 MG/100ML
5-50 INJECTION INTRAVENOUS CONTINUOUS PRN
Status: DISCONTINUED | OUTPATIENT
Start: 2023-02-20 | End: 2023-02-21

## 2023-02-20 RX ORDER — HEPARIN SODIUM 1000 [USP'U]/ML
INJECTION, SOLUTION INTRAVENOUS; SUBCUTANEOUS AS NEEDED
Status: DISCONTINUED | OUTPATIENT
Start: 2023-02-20 | End: 2023-02-20 | Stop reason: SURG

## 2023-02-20 RX ORDER — SODIUM CHLORIDE 9 MG/ML
INJECTION, SOLUTION INTRAVENOUS CONTINUOUS PRN
Status: DISCONTINUED | OUTPATIENT
Start: 2023-02-20 | End: 2023-02-20 | Stop reason: SURG

## 2023-02-20 RX ORDER — SODIUM CHLORIDE 0.9 % (FLUSH) 0.9 %
30 SYRINGE (ML) INJECTION ONCE AS NEEDED
Status: DISCONTINUED | OUTPATIENT
Start: 2023-02-20 | End: 2023-02-20 | Stop reason: HOSPADM

## 2023-02-20 RX ORDER — OLANZAPINE 10 MG/2ML
1 INJECTION, POWDER, LYOPHILIZED, FOR SOLUTION INTRAMUSCULAR
Status: DISCONTINUED | OUTPATIENT
Start: 2023-02-20 | End: 2023-02-22

## 2023-02-20 RX ORDER — DEXTROSE MONOHYDRATE 25 G/50ML
10-50 INJECTION, SOLUTION INTRAVENOUS
Status: DISCONTINUED | OUTPATIENT
Start: 2023-02-20 | End: 2023-02-22

## 2023-02-20 RX ORDER — NOREPINEPHRINE BIT/0.9 % NACL 8 MG/250ML
.02-.06 INFUSION BOTTLE (ML) INTRAVENOUS CONTINUOUS PRN
Status: DISCONTINUED | OUTPATIENT
Start: 2023-02-20 | End: 2023-02-21

## 2023-02-20 RX ORDER — MILRINONE LACTATE 0.2 MG/ML
INJECTION, SOLUTION INTRAVENOUS AS NEEDED
Status: DISCONTINUED | OUTPATIENT
Start: 2023-02-20 | End: 2023-02-20 | Stop reason: SURG

## 2023-02-20 RX ORDER — CHLORHEXIDINE GLUCONATE 0.12 MG/ML
15 RINSE ORAL EVERY 12 HOURS
Status: DISCONTINUED | OUTPATIENT
Start: 2023-02-20 | End: 2023-03-01 | Stop reason: HOSPADM

## 2023-02-20 RX ORDER — ONDANSETRON 2 MG/ML
4 INJECTION INTRAMUSCULAR; INTRAVENOUS EVERY 6 HOURS PRN
Status: DISCONTINUED | OUTPATIENT
Start: 2023-02-20 | End: 2023-03-01 | Stop reason: HOSPADM

## 2023-02-20 RX ORDER — CEFAZOLIN 2 G/1
INJECTION, POWDER, FOR SOLUTION INTRAMUSCULAR; INTRAVENOUS AS NEEDED
Status: DISCONTINUED | OUTPATIENT
Start: 2023-02-20 | End: 2023-02-20 | Stop reason: SURG

## 2023-02-20 RX ORDER — OXYCODONE HYDROCHLORIDE 5 MG/1
5 TABLET ORAL EVERY 4 HOURS PRN
Status: DISCONTINUED | OUTPATIENT
Start: 2023-02-20 | End: 2023-02-22

## 2023-02-20 RX ORDER — MEPERIDINE HYDROCHLORIDE 25 MG/ML
25 INJECTION INTRAMUSCULAR; INTRAVENOUS; SUBCUTANEOUS ONCE AS NEEDED
Status: DISCONTINUED | OUTPATIENT
Start: 2023-02-20 | End: 2023-02-21

## 2023-02-20 RX ORDER — MIDAZOLAM HYDROCHLORIDE 1 MG/ML
INJECTION INTRAMUSCULAR; INTRAVENOUS AS NEEDED
Status: DISCONTINUED | OUTPATIENT
Start: 2023-02-20 | End: 2023-02-20 | Stop reason: SURG

## 2023-02-20 RX ADMIN — MORPHINE SULFATE 2 MG: 4 INJECTION, SOLUTION INTRAMUSCULAR; INTRAVENOUS at 19:48

## 2023-02-20 RX ADMIN — CYCLOBENZAPRINE 10 MG: 10 TABLET, FILM COATED ORAL at 05:21

## 2023-02-20 RX ADMIN — INSULIN HUMAN 1 UNITS/HR: 1 INJECTION, SOLUTION INTRAVENOUS at 19:17

## 2023-02-20 RX ADMIN — FENTANYL CITRATE 250 MCG: 0.05 INJECTION, SOLUTION INTRAMUSCULAR; INTRAVENOUS at 12:54

## 2023-02-20 RX ADMIN — DEXMEDETOMIDINE HYDROCHLORIDE 0.5 MCG/KG/HR: 400 INJECTION INTRAVENOUS at 18:09

## 2023-02-20 RX ADMIN — PROTAMINE SULFATE 300 MG: 10 INJECTION, SOLUTION INTRAVENOUS at 16:07

## 2023-02-20 RX ADMIN — AMINOCAPROIC ACID 10 G: 250 INJECTION, SOLUTION INTRAVENOUS at 14:10

## 2023-02-20 RX ADMIN — Medication 12.5 MG: at 08:30

## 2023-02-20 RX ADMIN — Medication 3 ML: at 08:33

## 2023-02-20 RX ADMIN — ALBUMIN (HUMAN) 12.5 G: 12.5 INJECTION, SOLUTION INTRAVENOUS at 20:22

## 2023-02-20 RX ADMIN — POTASSIUM PHOSPHATE, MONOBASIC AND POTASSIUM PHOSPHATE, DIBASIC 18 MMOL: 224; 236 INJECTION, SOLUTION, CONCENTRATE INTRAVENOUS at 18:51

## 2023-02-20 RX ADMIN — EPHEDRINE SULFATE 10 MG: 5 INJECTION INTRAVENOUS at 13:32

## 2023-02-20 RX ADMIN — NITROGLYCERIN 200 MCG: 20 INJECTION INTRAVENOUS at 14:18

## 2023-02-20 RX ADMIN — VENLAFAXINE HYDROCHLORIDE 150 MG: 75 CAPSULE, EXTENDED RELEASE ORAL at 08:29

## 2023-02-20 RX ADMIN — VECURONIUM BROMIDE 3 MG: 10 INJECTION, POWDER, LYOPHILIZED, FOR SOLUTION INTRAVENOUS at 15:55

## 2023-02-20 RX ADMIN — MAGNESIUM SULFATE HEPTAHYDRATE 2 G: 500 INJECTION, SOLUTION INTRAMUSCULAR; INTRAVENOUS at 15:55

## 2023-02-20 RX ADMIN — SUCCINYLCHOLINE CHLORIDE 100 MG: 20 INJECTION, SOLUTION INTRAMUSCULAR; INTRAVENOUS at 12:54

## 2023-02-20 RX ADMIN — EPINEPHRINE 10 MCG: 0.1 INJECTION, SOLUTION ENDOTRACHEAL; INTRACARDIAC; INTRAVENOUS at 13:47

## 2023-02-20 RX ADMIN — MORPHINE SULFATE 2 MG: 4 INJECTION, SOLUTION INTRAMUSCULAR; INTRAVENOUS at 23:20

## 2023-02-20 RX ADMIN — VECURONIUM BROMIDE 4 MG: 10 INJECTION, POWDER, LYOPHILIZED, FOR SOLUTION INTRAVENOUS at 13:49

## 2023-02-20 RX ADMIN — EPINEPHRINE 0.02 MCG/KG/MIN: 1 INJECTION INTRAMUSCULAR; INTRAVENOUS; SUBCUTANEOUS at 14:06

## 2023-02-20 RX ADMIN — MILRINONE LACTATE 0.38 MCG/KG/MIN: 0.2 INJECTION, SOLUTION INTRAVENOUS at 21:59

## 2023-02-20 RX ADMIN — HEPARIN SODIUM 30000 UNITS: 1000 INJECTION INTRAVENOUS; SUBCUTANEOUS at 14:25

## 2023-02-20 RX ADMIN — MUPIROCIN 1 APPLICATION: 20 OINTMENT TOPICAL at 20:40

## 2023-02-20 RX ADMIN — BUDESONIDE AND FORMOTEROL FUMARATE DIHYDRATE 2 PUFF: 160; 4.5 AEROSOL RESPIRATORY (INHALATION) at 07:03

## 2023-02-20 RX ADMIN — VECURONIUM BROMIDE 10 MG: 10 INJECTION, POWDER, LYOPHILIZED, FOR SOLUTION INTRAVENOUS at 13:01

## 2023-02-20 RX ADMIN — CHLORHEXIDINE GLUCONATE 0.12% ORAL RINSE 15 ML: 1.2 LIQUID ORAL at 05:21

## 2023-02-20 RX ADMIN — PANTOPRAZOLE SODIUM 40 MG: 40 INJECTION, POWDER, LYOPHILIZED, FOR SOLUTION INTRAVENOUS at 21:25

## 2023-02-20 RX ADMIN — ACETAMINOPHEN 1000 MG: 10 INJECTION, SOLUTION INTRAVENOUS at 16:34

## 2023-02-20 RX ADMIN — DEXMEDETOMIDINE HYDROCHLORIDE 0.5 MCG/KG/HR: 400 INJECTION INTRAVENOUS at 19:15

## 2023-02-20 RX ADMIN — MILRINONE LACTATE 0.37 MCG/KG/MIN: 0.2 INJECTION, SOLUTION INTRAVENOUS at 13:37

## 2023-02-20 RX ADMIN — VECURONIUM BROMIDE 3 MG: 10 INJECTION, POWDER, LYOPHILIZED, FOR SOLUTION INTRAVENOUS at 16:36

## 2023-02-20 RX ADMIN — PHENYLEPHRINE HYDROCHLORIDE 200 MCG: 10 INJECTION INTRAVENOUS at 13:10

## 2023-02-20 RX ADMIN — ONDANSETRON 4 MG: 2 INJECTION INTRAMUSCULAR; INTRAVENOUS at 16:32

## 2023-02-20 RX ADMIN — ALPRAZOLAM 0.25 MG: 0.25 TABLET ORAL at 05:21

## 2023-02-20 RX ADMIN — MIDAZOLAM HYDROCHLORIDE 5 MG: 1 INJECTION, SOLUTION INTRAMUSCULAR; INTRAVENOUS at 12:42

## 2023-02-20 RX ADMIN — ONDANSETRON 4 MG: 2 INJECTION INTRAMUSCULAR; INTRAVENOUS at 05:21

## 2023-02-20 RX ADMIN — FENTANYL CITRATE 250 MCG: 0.05 INJECTION, SOLUTION INTRAMUSCULAR; INTRAVENOUS at 14:16

## 2023-02-20 RX ADMIN — Medication 25 MG: at 12:54

## 2023-02-20 RX ADMIN — SODIUM CHLORIDE 30 ML/HR: 0.9 INJECTION, SOLUTION INTRAVENOUS at 19:20

## 2023-02-20 RX ADMIN — Medication 0.03 MCG/KG/MIN: at 15:13

## 2023-02-20 RX ADMIN — ACETAMINOPHEN 500 MG: 500 TABLET, FILM COATED ORAL at 05:21

## 2023-02-20 RX ADMIN — DEXMEDETOMIDINE HYDROCHLORIDE 0.5 MCG/KG/HR: 100 INJECTION, SOLUTION INTRAVENOUS at 12:42

## 2023-02-20 RX ADMIN — PHENYLEPHRINE HYDROCHLORIDE 200 MCG: 10 INJECTION INTRAVENOUS at 13:19

## 2023-02-20 RX ADMIN — VECURONIUM BROMIDE 6 MG: 10 INJECTION, POWDER, LYOPHILIZED, FOR SOLUTION INTRAVENOUS at 14:16

## 2023-02-20 RX ADMIN — DEXMEDETOMIDINE HYDROCHLORIDE 1.2 MCG/KG/HR: 400 INJECTION INTRAVENOUS at 21:54

## 2023-02-20 RX ADMIN — ACETAMINOPHEN 1000 MG: 10 INJECTION, SOLUTION INTRAVENOUS at 23:44

## 2023-02-20 RX ADMIN — PHENYLEPHRINE HYDROCHLORIDE 200 MCG: 10 INJECTION INTRAVENOUS at 13:25

## 2023-02-20 RX ADMIN — CEFAZOLIN 2 G: 2 INJECTION, POWDER, FOR SOLUTION INTRAMUSCULAR; INTRAVENOUS at 13:29

## 2023-02-20 RX ADMIN — VANCOMYCIN 1.75 GRAM/500 ML IN 0.9 % SODIUM CHLORIDE INTRAVENOUS 1750 MG: at 12:47

## 2023-02-20 RX ADMIN — Medication 25 MG: at 12:42

## 2023-02-20 RX ADMIN — VECURONIUM BROMIDE 2 MG: 10 INJECTION, POWDER, LYOPHILIZED, FOR SOLUTION INTRAVENOUS at 14:57

## 2023-02-20 RX ADMIN — CHLORHEXIDINE GLUCONATE 0.12% ORAL RINSE 15 ML: 1.2 LIQUID ORAL at 21:25

## 2023-02-20 RX ADMIN — LEVOTHYROXINE SODIUM 150 MCG: 0.15 TABLET ORAL at 05:21

## 2023-02-20 RX ADMIN — SODIUM CHLORIDE: 0.9 INJECTION, SOLUTION INTRAVENOUS at 12:28

## 2023-02-20 RX ADMIN — HYDROCORTISONE SODIUM SUCCINATE 100 MG: 100 INJECTION, POWDER, FOR SOLUTION INTRAMUSCULAR; INTRAVENOUS at 13:39

## 2023-02-20 RX ADMIN — MIDAZOLAM HYDROCHLORIDE 5 MG: 1 INJECTION, SOLUTION INTRAMUSCULAR; INTRAVENOUS at 12:54

## 2023-02-20 RX ADMIN — SODIUM CHLORIDE: 0.9 INJECTION, SOLUTION INTRAVENOUS at 16:37

## 2023-02-20 RX ADMIN — EPINEPHRINE 10 MCG: 0.1 INJECTION, SOLUTION ENDOTRACHEAL; INTRACARDIAC; INTRAVENOUS at 13:37

## 2023-02-20 RX ADMIN — CEFAZOLIN 2 G: 2 INJECTION, POWDER, FOR SOLUTION INTRAMUSCULAR; INTRAVENOUS at 16:07

## 2023-02-20 RX ADMIN — SODIUM CHLORIDE 4 UNITS: 9 INJECTION, SOLUTION INTRAVENOUS at 15:00

## 2023-02-20 NOTE — ANESTHESIA PROCEDURE NOTES
Airway  Urgency: elective    Date/Time: 2/20/2023 1:00 PM  Airway not difficult    General Information and Staff    Patient location during procedure: OR  Anesthesiologist: Piter Dumas MD    Indications and Patient Condition  Indications for airway management: airway protection    Preoxygenated: yes  MILS not maintained throughout  Mask difficulty assessment: 1 - vent by mask    Final Airway Details  Final airway type: endotracheal airway      Successful airway: ETT  Cuffed: yes   Successful intubation technique: direct laryngoscopy  Endotracheal tube insertion site: oral  Blade: Flores  Blade size: 4  ETT size (mm): 7.0  Cormack-Lehane Classification: grade IIb - view of arytenoids or posterior of glottis only  Placement verified by: capnometry and palpation of cuff   Measured from: lips  Number of attempts at approach: 1  Assessment: lips, teeth, and gum same as pre-op and atraumatic intubation    Additional Comments  ASA monitors applied; preoxygenated with 100% FiO2 via anesthesia face mask; induction of general anesthesia; bag-mask ventilation; patient's position optimized; laryngoscopy; cuffed ETT lubricated with lidocaine jelly and placed into the trachea; cuff inflated to seal with minimally occlusive airway cuff pressure; ETT connected to anesthesia circuit; atraumatic/dentition in preoperative condition; ETT secured in place; correct placement in the trachea confirmed by bilateral chest rise, tube condensation, and return of EtCO2 > 30 mmHg x3

## 2023-02-20 NOTE — ANESTHESIA PROCEDURE NOTES
Central Line      Staff  Anesthesiologist: Piter Dumas MD  Preanesthetic Checklist  Completed: patient identified, IV checked, site marked, risks and benefits discussed, surgical consent, monitors and equipment checked, pre-op evaluation and timeout performed  Central Line Prep  Sterile Tech:gloves, cap, gown, mask and sterile barriers  Prep: chloraprep  Patient monitoring: blood pressure monitoring, continuous pulse oximetry and EKG  Central Line Procedure  Laterality:right  Location:internal jugular  Catheter Type:[place]la  Catheter Size:9 Fr  Guidance:landmark technique and palpation technique  Assessment  Post procedure:biopatch applied, line sutured and occlusive dressing applied  Assessement:blood return through all ports, free fluid flow, chest x-ray ordered and Mani Test  Complications:no  Patient Tolerance:patient tolerated the procedure well with no apparent complications  Additional Notes  Floated 1 attempt no resistance, no wedge

## 2023-02-20 NOTE — ANESTHESIA PROCEDURE NOTES
Emergent/Open-Heart Anesthesia GATO    Procedure Performed: Emergent/Open-Heart Anesthesia GATO       Start Time:        End Time:      Preanesthesia Checklist:  Patient identified, IV assessed, risks and benefits discussed, monitors and equipment assessed, procedure being performed at surgeon's request and anesthesia consent obtained.    General Procedure Information  Diagnostic Indications for Echo:  assessment of surgical repair and hemodynamic monitoring  Location performed:  OR  Intubated  Bite block placed  Heart visualized  Probe Insertion:  Easy  Probe Type:  Biplane and multiplane  Modalities:  Color flow mapping, pulse wave Doppler and continuous wave Doppler    Echocardiographic and Doppler Measurements    Ventricles    Right Ventricle:  Cavity size normal.  Hypertrophy not present.  Thrombus not present.  Global function moderately impaired.  Ejection Fraction 30%.    Left Ventricle:  Cavity size normal.  Thrombus not present.  Global Function normal.  Ejection Fraction 60%.      Ventricular Regional Function:  1- Basal Anteroseptal:  normal  2- Basal Anterior:  normal  3- Basal Anterolateral:  normal  4- Basal Inferolateral:  normal  5- Basal Inferior:  normal  6- Basal Inferoseptal:  normal  7- Mid Anteroseptal:  normal  8- Mid Anterior:  normal  9- Mid Anterolateral:  normal  10- Mid Inferolateral:  normal  11- Mid Inferior:  normal  12- Mid Inferoseptal:  normal  13- Apical Anterior:  normal  14- Apical Lateral:  normal  15- Apical Inferior:  normal  16- Apical Septal:  normal  17- [place]:  normal      Valves    Aortic Valve:  Annulus normal.  Stenosis not present.  Regurgitation absent.  Leaflets normal.  Leaflet motions normal.      Mitral Valve:  Annulus normal.  Stenosis not present.  Regurgitation severe.  Leaflets thickened.  Leaflet motions normal and restricted.      Tricuspid Valve:  Annulus normal.  Stenosis not present.  Regurgitation mild.  Leaflets normal.  Leaflet motions normal.     Pulmonic Valve:  Annulus normal.  Stenosis not present.  Regurgitation absent.        Aorta    Ascending Aorta:  Size normal.  Dissection not present.  Plaque thickness less than 3 mm.  Mobile plaque not present.    Aortic Arch:  Size normal.  Dissection not present.  Plaque thickness less than 3 mm.  Mobile plaque not present.    Descending Aorta:  Size normal.  Dissection not present.  Plaque thickness less than 3 mm.  Mobile plaque not present.        Atria    Right Atrium:  Size normal.    Left Atrium:  Size normal.  Spontaneous echo contrast not present.  Thrombus not present.  Tumor not present.  Device not present.    Left atrial appendage normal.      Septa    Atrial Septum:  Intra-atrial septal morphology contains patent foramen ovale.  Patent foramen ovale shunts left to right.      Ventricular Septum:  Intra-ventricular septum morphology normal.          Other Findings  Pericardium:  normal  Pleural Effusion:  none  Pulmonary Arteries:  normal      Anesthesia Information  Performed Personally  Anesthesiologist:  Piter Dumas MD      Echocardiogram Comments:       GATO placed easily , lubricated , bite guard , only mid esophagel views, h/x of gastric sleeve     Pre cpb   LV nl SF   RV mod dec RVSF (improved with inotropes)  MV bileaflet rsxn sev post directed MR thickened leaflets   TV mild>mod TR   AV wnl   Significant L>R PFO       S/p cpb cabg x1 MVR  LV no change nl SF   RV improved SF nl   MV mech MV washing jet only no paravalvular seen  AV no change  TV tr TR    PFO no longer visible with CFD

## 2023-02-20 NOTE — ANESTHESIA POSTPROCEDURE EVALUATION
Patient: Altagracia Tiwari    Procedure Summary     Date: 02/20/23 Room / Location: [facility] CVOR 01 / [facility]    Anesthesia Start: 1238 Anesthesia Stop: 1731    Procedures:       MITRAL VALVE REPAIR/REPLACEMENT (Chest)      CORONARY ARTERY BYPASS GRAFTING (Chest)      TRANSESOPHAGEAL ECHOCARDIOGRAM WITH ANESTHESIA (Chest) Diagnosis:       Nonrheumatic mitral valve regurgitation      Coronary artery disease of native artery of native heart with stable angina pectoris (HCC)      (Nonrheumatic mitral valve regurgitation [I34.0])      (Coronary artery disease of native artery of native heart with stable angina pectoris (HCC) [I25.118])    Surgeons: Wenceslao Head MD Provider: Pitre Dumas MD    Anesthesia Type: general, [place], CVL, PAC ASA Status: 4          Anesthesia Type: general, [place], CVL, PAC    Vitals  Vitals Value Taken Time   BP     Temp 97.16 °F (36.2 °C) 02/20/23 1744   Pulse 89 02/20/23 1744   Resp     SpO2 89 % 02/20/23 1744   Vitals shown include unvalidated device data.        Post Anesthesia Care and Evaluation    Patient location during evaluation: ICU  Patient participation: complete - patient cannot participate  Level of consciousness: obtunded/minimal responses  Pain scale: See nurse's notes for pain score.  Pain management: adequate    Airway patency: patent  Anesthetic complications: No anesthetic complications  PONV Status: none  Cardiovascular status: acceptable  Respiratory status: acceptable, ventilator and ETT  Hydration status: acceptable    Comments: Patient seen and examined postoperatively; vital signs stable; SpO2 greater than or equal to 90%; cardiopulmonary status stable; nausea/vomiting adequately controlled; pain adequately controlled; no apparent anesthesia complications; patient discharged from anesthesia care when discharge criteria were met

## 2023-02-20 NOTE — ANESTHESIA PROCEDURE NOTES
Arterial Line      Start time: 2/20/2023 12:40 PM  Stop Time:2/20/2023 12:46 PM       Line placed for hemodynamic monitoring and ABGs/Labs/ISTAT.  Performed By   Anesthesiologist: Piter Dumas MD   Preanesthetic Checklist  Completed: patient identified, IV checked, site marked, risks and benefits discussed, surgical consent, monitors and equipment checked, pre-op evaluation and timeout performed  Arterial Line Prep    Sterile Tech: cap, gloves, sterile barriers, gown and mask  Prep: ChloraPrep  Patient monitoring: EKG, continuous pulse oximetry and blood pressure monitoring  Arterial Line Procedure   Laterality:left  Location:  radial artery  Catheter size: 20 G   Guidance: palpation technique  Number of attempts: 1  Successful placement: yes   Post Assessment   Dressing Type: wrist guard applied, secured with tape and occlusive dressing applied.   Complications no  Circ/Move/Sens Assessment: normal and unchanged.   Patient Tolerance: patient tolerated the procedure well with no apparent complications  Additional Notes  Sterile seldinger technique, tolerated well

## 2023-02-20 NOTE — ANESTHESIA PROCEDURE NOTES
Central Line      Start time: 2/20/2023 1:10 PM  Stop Time:2/20/2023 1:25 PM  Staff  Anesthesiologist: Piter Dumas MD  Preanesthetic Checklist  Completed: patient identified, IV checked, site marked, risks and benefits discussed, surgical consent, monitors and equipment checked, pre-op evaluation and timeout performed  Central Line Prep  Sterile Tech:gloves, cap, gown, mask and sterile barriers  Prep: chloraprep  Patient monitoring: blood pressure monitoring, continuous pulse oximetry and EKG  Central Line Procedure  Laterality:left  Location:internal jugular  Catheter Type:Cordis  Catheter Size:9 Fr  Guidance:ultrasound guided, landmark technique and palpation technique  PROCEDURE NOTE/ULTRASOUND INTERPRETATION.  Using ultrasound guidance the potential vascular sites for insertion of the catheter were visualized to determine the patency of the vessel to be used for vascular access.  After selecting the appropriate site for insertion, the needle was visualized under ultrasound being inserted into the internal jugular vein, followed by ultrasound confirmation of wire and catheter placement. There were no abnormalities seen on ultrasound; an image was taken; and the patient tolerated the procedure with no complications. Images: still images not obtained  Assessment  Post procedure:biopatch applied, line sutured and occlusive dressing applied  Assessement:blood return through all ports, free fluid flow, chest x-ray ordered and Mani Test  Complications:no  Patient Tolerance:patient tolerated the procedure well with no apparent complications  Additional Notes  Sterile prep, drape, gown and gloves.  Negative mani negative carotid, no difficulties.  Tolerated well.

## 2023-02-21 ENCOUNTER — APPOINTMENT (OUTPATIENT)
Dept: GENERAL RADIOLOGY | Facility: HOSPITAL | Age: 48
DRG: 216 | End: 2023-02-21
Payer: COMMERCIAL

## 2023-02-21 LAB
ALBUMIN SERPL-MCNC: 4.4 G/DL (ref 3.5–5.2)
ANION GAP SERPL CALCULATED.3IONS-SCNC: 13 MMOL/L (ref 5–15)
ANISOCYTOSIS BLD QL: ABNORMAL
ARTERIAL PATENCY WRIST A: ABNORMAL
ATMOSPHERIC PRESS: ABNORMAL MM[HG]
BASE DEFICIT: -4.2 MEQ/LITER
BASE EXCESS BLDA CALC-SCNC: -1.5 MMOL/L (ref 0–3)
BASE EXCESS BLDA CALC-SCNC: -1.9 MMOL/L (ref 0–3)
BASE EXCESS BLDA CALC-SCNC: -2.9 MMOL/L (ref 0–3)
BASE EXCESS BLDA CALC-SCNC: -3.3 MMOL/L (ref 0–3)
BASE EXCESS BLDA CALC-SCNC: -3.7 MMOL/L (ref 0–3)
BASE EXCESS BLDA CALC-SCNC: -3.7 MMOL/L (ref 0–3)
BASE EXCESS BLDA CALC-SCNC: -4 MMOL/L (ref 0–3)
BDY SITE: ABNORMAL
BUN SERPL-MCNC: 12 MG/DL (ref 6–20)
BUN/CREAT SERPL: 11.1 (ref 7–25)
CA-I BLDA-SCNC: 1.18 MMOL/L (ref 1.15–1.33)
CA-I BLDA-SCNC: 1.23 MMOL/L (ref 1.15–1.33)
CA-I BLDA-SCNC: 1.24 MMOL/L (ref 1.15–1.33)
CA-I BLDA-SCNC: 1.26 MMOL/L (ref 1.15–1.33)
CA-I BLDA-SCNC: 1.27 MMOL/L (ref 1.15–1.33)
CA-I SERPL ISE-MCNC: 1.17 MMOL/L (ref 1.2–1.3)
CALCIUM SPEC-SCNC: 8.9 MG/DL (ref 8.6–10.5)
CHLORIDE SERPL-SCNC: 106 MMOL/L (ref 98–107)
CO2 BLDA-SCNC: 22.1 MMOL/L (ref 22–29)
CO2 BLDA-SCNC: 24.2 MMOL/L (ref 22–29)
CO2 BLDA-SCNC: 25.6 MMOL/L (ref 22–29)
CO2 BLDA-SCNC: 26.1 MMOL/L (ref 22–29)
CO2 SERPL-SCNC: 20 MMOL/L (ref 22–29)
CREAT SERPL-MCNC: 1.08 MG/DL (ref 0.57–1)
DEPRECATED RDW RBC AUTO: 74.8 FL (ref 37–54)
DIMORPHIC RBC: PRESENT
EGFRCR SERPLBLD CKD-EPI 2021: 63.1 ML/MIN/1.73
ELLIPTOCYTES BLD QL SMEAR: ABNORMAL
ERYTHROCYTE [DISTWIDTH] IN BLOOD BY AUTOMATED COUNT: 33.7 % (ref 12.3–15.4)
GIANT PLATELETS: ABNORMAL
GLUCOSE BLDC GLUCOMTR-MCNC: 107 MG/DL (ref 70–105)
GLUCOSE BLDC GLUCOMTR-MCNC: 118 MG/DL (ref 70–105)
GLUCOSE BLDC GLUCOMTR-MCNC: 122 MG/DL (ref 70–105)
GLUCOSE BLDC GLUCOMTR-MCNC: 123 MG/DL (ref 70–105)
GLUCOSE BLDC GLUCOMTR-MCNC: 127 MG/DL (ref 70–105)
GLUCOSE BLDC GLUCOMTR-MCNC: 128 MG/DL (ref 70–105)
GLUCOSE BLDC GLUCOMTR-MCNC: 128 MG/DL (ref 70–105)
GLUCOSE BLDC GLUCOMTR-MCNC: 131 MG/DL (ref 70–105)
GLUCOSE BLDC GLUCOMTR-MCNC: 134 MG/DL (ref 74–100)
GLUCOSE BLDC GLUCOMTR-MCNC: 134 MG/DL (ref 74–100)
GLUCOSE BLDC GLUCOMTR-MCNC: 135 MG/DL (ref 70–105)
GLUCOSE BLDC GLUCOMTR-MCNC: 136 MG/DL (ref 70–105)
GLUCOSE BLDC GLUCOMTR-MCNC: 136 MG/DL (ref 70–105)
GLUCOSE BLDC GLUCOMTR-MCNC: 137 MG/DL (ref 70–105)
GLUCOSE BLDC GLUCOMTR-MCNC: 137 MG/DL (ref 70–105)
GLUCOSE BLDC GLUCOMTR-MCNC: 138 MG/DL (ref 74–100)
GLUCOSE BLDC GLUCOMTR-MCNC: 138 MG/DL (ref 74–100)
GLUCOSE BLDC GLUCOMTR-MCNC: 139 MG/DL (ref 70–105)
GLUCOSE BLDC GLUCOMTR-MCNC: 139 MG/DL (ref 74–100)
GLUCOSE BLDC GLUCOMTR-MCNC: 139 MG/DL (ref 74–100)
GLUCOSE BLDC GLUCOMTR-MCNC: 154 MG/DL (ref 74–100)
GLUCOSE BLDC GLUCOMTR-MCNC: 154 MG/DL (ref 74–100)
GLUCOSE BLDC GLUCOMTR-MCNC: 157 MG/DL (ref 70–105)
GLUCOSE BLDC GLUCOMTR-MCNC: 162 MG/DL (ref 70–105)
GLUCOSE BLDC GLUCOMTR-MCNC: 165 MG/DL (ref 70–105)
GLUCOSE BLDC GLUCOMTR-MCNC: 169 MG/DL (ref 70–105)
GLUCOSE BLDC GLUCOMTR-MCNC: 190 MG/DL (ref 74–100)
GLUCOSE BLDC GLUCOMTR-MCNC: 190 MG/DL (ref 74–100)
GLUCOSE SERPL-MCNC: 160 MG/DL (ref 65–99)
HCO3 BLDA-SCNC: 20.9 MMOL/L (ref 21–28)
HCO3 BLDA-SCNC: 22.2 MMOL/L (ref 21–28)
HCO3 BLDA-SCNC: 22.7 MMOL/L (ref 21–28)
HCO3 BLDA-SCNC: 23.3 MMOL/L (ref 21–28)
HCO3 BLDA-SCNC: 24.2 MMOL/L (ref 21–28)
HCO3 BLDA-SCNC: 24.7 MMOL/L (ref 21–28)
HCO3 MIXED: 21 MMOL/L (ref 21–29)
HCT VFR BLD AUTO: 31.3 % (ref 34–46.6)
HCT VFR BLDA CALC: 30 % (ref 38–51)
HCT VFR BLDA CALC: 31 % (ref 38–51)
HCT VFR BLDA CALC: 31 % (ref 38–51)
HCT VFR BLDA CALC: 33 % (ref 38–51)
HCT VFR BLDA CALC: 33 % (ref 38–51)
HEMODILUTION: NO
HEMODILUTION: YES
HGB BLD-MCNC: 9.4 G/DL (ref 12–15.9)
HGB BLDA-MCNC: 10.3 G/DL (ref 12–17)
HGB BLDA-MCNC: 10.4 G/DL (ref 12–17)
HGB BLDA-MCNC: 10.4 G/DL (ref 12–17)
HGB BLDA-MCNC: 11.2 G/DL (ref 12–17)
HGB BLDA-MCNC: 11.4 G/DL (ref 12–17)
INHALED O2 CONCENTRATION: 32 %
INHALED O2 CONCENTRATION: 40 %
INR PPP: 1.18 (ref 0.93–1.1)
LYMPHOCYTES # BLD MANUAL: 0 10*3/MM3 (ref 0.7–3.1)
LYMPHOCYTES NFR BLD MANUAL: 2 % (ref 5–12)
Lab: NORMAL
MAGNESIUM SERPL-MCNC: 2.8 MG/DL (ref 1.6–2.6)
MCH RBC QN AUTO: 21.4 PG (ref 26.6–33)
MCHC RBC AUTO-ENTMCNC: 29.9 G/DL (ref 31.5–35.7)
MCV RBC AUTO: 71.5 FL (ref 79–97)
METAMYELOCYTES NFR BLD MANUAL: 2 % (ref 0–0)
MICROCYTES BLD QL: ABNORMAL
MODALITY: ABNORMAL
MONOCYTES # BLD: 0.17 10*3/MM3 (ref 0.1–0.9)
NEUTROPHILS # BLD AUTO: 8.06 10*3/MM3 (ref 1.7–7)
NEUTROPHILS NFR BLD MANUAL: 70 % (ref 42.7–76)
NEUTS BAND NFR BLD MANUAL: 26 % (ref 0–5)
O2 SATURATION MIXED: 72.3 %
PCO2 BLDA: 31.8 MM HG (ref 35–48)
PCO2 BLDA: 44.2 MM HG (ref 35–48)
PCO2 BLDA: 46.1 MM HG (ref 35–48)
PCO2 BLDA: 46.4 MM HG (ref 35–48)
PCO2 BLDA: 47.3 MM HG (ref 35–48)
PCO2 BLDA: 47.8 MM HG (ref 35–48)
PCO2 MIXED: 35.7 MMHG (ref 35–51)
PEEP RESPIRATORY: 5 CM[H2O]
PH BLDA: 7.3 PH UNITS (ref 7.35–7.45)
PH BLDA: 7.3 PH UNITS (ref 7.35–7.45)
PH BLDA: 7.31 PH UNITS (ref 7.35–7.45)
PH BLDA: 7.33 PH UNITS (ref 7.35–7.45)
PH BLDA: 7.33 PH UNITS (ref 7.35–7.45)
PH BLDA: 7.43 PH UNITS (ref 7.35–7.45)
PH MIXED: 7.38 PH UNITS (ref 7.32–7.45)
PHOSPHATE SERPL-MCNC: 4.3 MG/DL (ref 2.5–4.5)
PLATELET # BLD AUTO: 194 10*3/MM3 (ref 140–450)
PMV BLD AUTO: 8.7 FL (ref 6–12)
PO2 BLDA: 103.8 MM HG (ref 83–108)
PO2 BLDA: 108.1 MM HG (ref 83–108)
PO2 BLDA: 117.2 MM HG (ref 83–108)
PO2 BLDA: 138.2 MM HG (ref 83–108)
PO2 BLDA: 138.3 MM HG (ref 83–108)
PO2 BLDA: 140.7 MM HG (ref 83–108)
PO2 MIXED: 38.7 MMHG
POIKILOCYTOSIS BLD QL SMEAR: ABNORMAL
POTASSIUM BLDA-SCNC: 3.8 MMOL/L (ref 3.5–4.5)
POTASSIUM BLDA-SCNC: 3.9 MMOL/L (ref 3.5–4.5)
POTASSIUM BLDA-SCNC: 4 MMOL/L (ref 3.5–4.5)
POTASSIUM BLDA-SCNC: 4 MMOL/L (ref 3.5–4.5)
POTASSIUM BLDA-SCNC: 4.1 MMOL/L (ref 3.5–4.5)
POTASSIUM SERPL-SCNC: 4.1 MMOL/L (ref 3.5–5.2)
PROTHROMBIN TIME: 12 SECONDS (ref 9.6–11.7)
PSV: 10 CMH2O
QT INTERVAL: 409 MS
RBC # BLD AUTO: 4.38 10*6/MM3 (ref 3.77–5.28)
RESPIRATORY RATE: 12
SAO2 % BLDCOA: 97.4 % (ref 94–98)
SAO2 % BLDCOA: 98 % (ref 94–98)
SAO2 % BLDCOA: 98.4 % (ref 94–98)
SAO2 % BLDCOA: 98.8 % (ref 94–98)
SAO2 % BLDCOA: 98.9 % (ref 94–98)
SAO2 % BLDCOA: 99 % (ref 94–98)
SCAN SLIDE: NORMAL
SET MECH RESP RATE: 12
SODIUM BLD-SCNC: 141 MMOL/L (ref 138–146)
SODIUM BLD-SCNC: 142 MMOL/L (ref 138–146)
SODIUM BLD-SCNC: 142 MMOL/L (ref 138–146)
SODIUM SERPL-SCNC: 139 MMOL/L (ref 136–145)
VARIANT LYMPHS NFR BLD MANUAL: 0 % (ref 19.6–45.3)
VENTILATOR MODE: ABNORMAL
VT ON VENT VENT: 800 ML
WBC MORPH BLD: NORMAL
WBC NRBC COR # BLD: 8.4 10*3/MM3 (ref 3.4–10.8)

## 2023-02-21 PROCEDURE — 83735 ASSAY OF MAGNESIUM: CPT | Performed by: NURSE PRACTITIONER

## 2023-02-21 PROCEDURE — 82962 GLUCOSE BLOOD TEST: CPT

## 2023-02-21 PROCEDURE — 99232 SBSQ HOSP IP/OBS MODERATE 35: CPT | Performed by: INTERNAL MEDICINE

## 2023-02-21 PROCEDURE — 25010000002 MAGNESIUM SULFATE IN D5W 1G/100ML (PREMIX) 1-5 GM/100ML-% SOLUTION: Performed by: NURSE PRACTITIONER

## 2023-02-21 PROCEDURE — 80051 ELECTROLYTE PANEL: CPT

## 2023-02-21 PROCEDURE — 0 MILRINONE LACTATE IN DEXTROSE 20-5 MG/100ML-% SOLUTION: Performed by: NURSE PRACTITIONER

## 2023-02-21 PROCEDURE — 82330 ASSAY OF CALCIUM: CPT

## 2023-02-21 PROCEDURE — 25010000002 ALBUMIN HUMAN 5% PER 50 ML: Performed by: NURSE PRACTITIONER

## 2023-02-21 PROCEDURE — 94799 UNLISTED PULMONARY SVC/PX: CPT

## 2023-02-21 PROCEDURE — 71045 X-RAY EXAM CHEST 1 VIEW: CPT

## 2023-02-21 PROCEDURE — P9041 ALBUMIN (HUMAN),5%, 50ML: HCPCS | Performed by: NURSE PRACTITIONER

## 2023-02-21 PROCEDURE — 25010000002 FUROSEMIDE PER 20 MG: Performed by: NURSE PRACTITIONER

## 2023-02-21 PROCEDURE — 94003 VENT MGMT INPAT SUBQ DAY: CPT

## 2023-02-21 PROCEDURE — 85025 COMPLETE CBC W/AUTO DIFF WBC: CPT | Performed by: NURSE PRACTITIONER

## 2023-02-21 PROCEDURE — 97166 OT EVAL MOD COMPLEX 45 MIN: CPT

## 2023-02-21 PROCEDURE — 25010000002 CALCIUM GLUCONATE 2-0.675 GM/100ML-% SOLUTION: Performed by: NURSE PRACTITIONER

## 2023-02-21 PROCEDURE — 82330 ASSAY OF CALCIUM: CPT | Performed by: NURSE PRACTITIONER

## 2023-02-21 PROCEDURE — 82803 BLOOD GASES ANY COMBINATION: CPT

## 2023-02-21 PROCEDURE — 0 MILRINONE LACTATE IN DEXTROSE 20-5 MG/100ML-% SOLUTION: Performed by: THORACIC SURGERY (CARDIOTHORACIC VASCULAR SURGERY)

## 2023-02-21 PROCEDURE — 25010000002 ENOXAPARIN PER 10 MG: Performed by: NURSE PRACTITIONER

## 2023-02-21 PROCEDURE — 85018 HEMOGLOBIN: CPT

## 2023-02-21 PROCEDURE — 99024 POSTOP FOLLOW-UP VISIT: CPT | Performed by: THORACIC SURGERY (CARDIOTHORACIC VASCULAR SURGERY)

## 2023-02-21 PROCEDURE — 25010000002 MORPHINE PER 10 MG: Performed by: NURSE PRACTITIONER

## 2023-02-21 PROCEDURE — 93005 ELECTROCARDIOGRAM TRACING: CPT | Performed by: NURSE PRACTITIONER

## 2023-02-21 PROCEDURE — 25010000002 CEFAZOLIN PER 500 MG: Performed by: NURSE PRACTITIONER

## 2023-02-21 PROCEDURE — 97163 PT EVAL HIGH COMPLEX 45 MIN: CPT

## 2023-02-21 PROCEDURE — 80069 RENAL FUNCTION PANEL: CPT | Performed by: NURSE PRACTITIONER

## 2023-02-21 PROCEDURE — 93010 ELECTROCARDIOGRAM REPORT: CPT | Performed by: INTERNAL MEDICINE

## 2023-02-21 PROCEDURE — 85007 BL SMEAR W/DIFF WBC COUNT: CPT | Performed by: NURSE PRACTITIONER

## 2023-02-21 PROCEDURE — 85610 PROTHROMBIN TIME: CPT | Performed by: NURSE PRACTITIONER

## 2023-02-21 PROCEDURE — 25010000002 KETOROLAC TROMETHAMINE PER 15 MG

## 2023-02-21 RX ORDER — IPRATROPIUM BROMIDE AND ALBUTEROL SULFATE 2.5; .5 MG/3ML; MG/3ML
3 SOLUTION RESPIRATORY (INHALATION)
Status: DISCONTINUED | OUTPATIENT
Start: 2023-02-21 | End: 2023-03-01 | Stop reason: HOSPADM

## 2023-02-21 RX ORDER — CALCIUM GLUCONATE 20 MG/ML
2 INJECTION, SOLUTION INTRAVENOUS ONCE
Status: COMPLETED | OUTPATIENT
Start: 2023-02-21 | End: 2023-02-21

## 2023-02-21 RX ORDER — FUROSEMIDE 10 MG/ML
40 INJECTION INTRAMUSCULAR; INTRAVENOUS ONCE
Status: COMPLETED | OUTPATIENT
Start: 2023-02-21 | End: 2023-02-21

## 2023-02-21 RX ORDER — BUDESONIDE 0.5 MG/2ML
0.5 INHALANT ORAL
Status: DISCONTINUED | OUTPATIENT
Start: 2023-02-21 | End: 2023-03-01 | Stop reason: HOSPADM

## 2023-02-21 RX ORDER — VENLAFAXINE HYDROCHLORIDE 75 MG/1
150 CAPSULE, EXTENDED RELEASE ORAL
Status: DISCONTINUED | OUTPATIENT
Start: 2023-02-21 | End: 2023-03-01 | Stop reason: HOSPADM

## 2023-02-21 RX ORDER — IPRATROPIUM BROMIDE AND ALBUTEROL SULFATE 2.5; .5 MG/3ML; MG/3ML
3 SOLUTION RESPIRATORY (INHALATION) EVERY 4 HOURS PRN
Status: DISCONTINUED | OUTPATIENT
Start: 2023-02-21 | End: 2023-03-01 | Stop reason: HOSPADM

## 2023-02-21 RX ORDER — CYCLOBENZAPRINE HCL 10 MG
10 TABLET ORAL 3 TIMES DAILY PRN
Status: DISCONTINUED | OUTPATIENT
Start: 2023-02-21 | End: 2023-03-01 | Stop reason: HOSPADM

## 2023-02-21 RX ORDER — LEVOTHYROXINE SODIUM 0.15 MG/1
150 TABLET ORAL
Status: DISCONTINUED | OUTPATIENT
Start: 2023-02-21 | End: 2023-03-01 | Stop reason: HOSPADM

## 2023-02-21 RX ORDER — LAMOTRIGINE 100 MG/1
200 TABLET ORAL DAILY
Status: DISCONTINUED | OUTPATIENT
Start: 2023-02-21 | End: 2023-03-01 | Stop reason: HOSPADM

## 2023-02-21 RX ORDER — KETOROLAC TROMETHAMINE 15 MG/ML
15 INJECTION, SOLUTION INTRAMUSCULAR; INTRAVENOUS EVERY 4 HOURS PRN
Status: COMPLETED | OUTPATIENT
Start: 2023-02-21 | End: 2023-02-22

## 2023-02-21 RX ADMIN — CEFAZOLIN 2 G: 2 INJECTION, POWDER, FOR SOLUTION INTRAMUSCULAR; INTRAVENOUS at 15:23

## 2023-02-21 RX ADMIN — CALCIUM GLUCONATE 2 G: 20 INJECTION, SOLUTION INTRAVENOUS at 09:45

## 2023-02-21 RX ADMIN — MAGNESIUM SULFATE IN DEXTROSE 1 G: 10 INJECTION, SOLUTION INTRAVENOUS at 15:23

## 2023-02-21 RX ADMIN — ENOXAPARIN SODIUM 40 MG: 100 INJECTION SUBCUTANEOUS at 15:23

## 2023-02-21 RX ADMIN — MORPHINE SULFATE 2 MG: 4 INJECTION, SOLUTION INTRAMUSCULAR; INTRAVENOUS at 06:50

## 2023-02-21 RX ADMIN — CEFAZOLIN 2 G: 2 INJECTION, POWDER, FOR SOLUTION INTRAMUSCULAR; INTRAVENOUS at 23:28

## 2023-02-21 RX ADMIN — MORPHINE SULFATE 2 MG: 4 INJECTION, SOLUTION INTRAMUSCULAR; INTRAVENOUS at 19:31

## 2023-02-21 RX ADMIN — MORPHINE SULFATE 2 MG: 4 INJECTION, SOLUTION INTRAMUSCULAR; INTRAVENOUS at 16:52

## 2023-02-21 RX ADMIN — SENNOSIDES AND DOCUSATE SODIUM 2 TABLET: 50; 8.6 TABLET ORAL at 20:15

## 2023-02-21 RX ADMIN — MAGNESIUM SULFATE IN DEXTROSE 1 G: 10 INJECTION, SOLUTION INTRAVENOUS at 08:05

## 2023-02-21 RX ADMIN — MUPIROCIN 1 APPLICATION: 20 OINTMENT TOPICAL at 08:05

## 2023-02-21 RX ADMIN — ALBUMIN (HUMAN) 12.5 G: 12.5 INJECTION, SOLUTION INTRAVENOUS at 01:50

## 2023-02-21 RX ADMIN — ALBUMIN (HUMAN) 12.5 G: 12.5 INJECTION, SOLUTION INTRAVENOUS at 00:22

## 2023-02-21 RX ADMIN — LAMOTRIGINE 200 MG: 100 TABLET ORAL at 09:45

## 2023-02-21 RX ADMIN — CHLORHEXIDINE GLUCONATE 0.12% ORAL RINSE 15 ML: 1.2 LIQUID ORAL at 08:04

## 2023-02-21 RX ADMIN — MILRINONE LACTATE 0.38 MCG/KG/MIN: 0.2 INJECTION, SOLUTION INTRAVENOUS at 05:23

## 2023-02-21 RX ADMIN — OXYCODONE HYDROCHLORIDE 5 MG: 5 TABLET ORAL at 20:15

## 2023-02-21 RX ADMIN — MAGNESIUM SULFATE IN DEXTROSE 1 G: 10 INJECTION, SOLUTION INTRAVENOUS at 00:44

## 2023-02-21 RX ADMIN — ATORVASTATIN CALCIUM 40 MG: 40 TABLET, FILM COATED ORAL at 20:15

## 2023-02-21 RX ADMIN — CEFAZOLIN 2 G: 2 INJECTION, POWDER, FOR SOLUTION INTRAMUSCULAR; INTRAVENOUS at 08:04

## 2023-02-21 RX ADMIN — DEXMEDETOMIDINE HYDROCHLORIDE 0.3 MCG/KG/HR: 400 INJECTION INTRAVENOUS at 05:23

## 2023-02-21 RX ADMIN — MORPHINE SULFATE 2 MG: 4 INJECTION, SOLUTION INTRAMUSCULAR; INTRAVENOUS at 13:02

## 2023-02-21 RX ADMIN — CHLORHEXIDINE GLUCONATE 0.12% ORAL RINSE 15 ML: 1.2 LIQUID ORAL at 20:15

## 2023-02-21 RX ADMIN — ALBUMIN (HUMAN) 12.5 G: 12.5 INJECTION, SOLUTION INTRAVENOUS at 05:18

## 2023-02-21 RX ADMIN — OXYCODONE HYDROCHLORIDE 5 MG: 5 TABLET ORAL at 15:22

## 2023-02-21 RX ADMIN — LEVOTHYROXINE SODIUM 150 MCG: 0.15 TABLET ORAL at 09:45

## 2023-02-21 RX ADMIN — BUDESONIDE 0.5 MG: 0.5 SUSPENSION RESPIRATORY (INHALATION) at 19:37

## 2023-02-21 RX ADMIN — VENLAFAXINE HYDROCHLORIDE 150 MG: 75 CAPSULE, EXTENDED RELEASE ORAL at 16:52

## 2023-02-21 RX ADMIN — CYCLOBENZAPRINE 10 MG: 10 TABLET, FILM COATED ORAL at 16:52

## 2023-02-21 RX ADMIN — FUROSEMIDE 40 MG: 10 INJECTION, SOLUTION INTRAMUSCULAR; INTRAVENOUS at 11:17

## 2023-02-21 RX ADMIN — IPRATROPIUM BROMIDE AND ALBUTEROL SULFATE 3 ML: 2.5; .5 SOLUTION RESPIRATORY (INHALATION) at 19:42

## 2023-02-21 RX ADMIN — MORPHINE SULFATE 2 MG: 4 INJECTION, SOLUTION INTRAMUSCULAR; INTRAVENOUS at 23:34

## 2023-02-21 RX ADMIN — OXYCODONE HYDROCHLORIDE 5 MG: 5 TABLET ORAL at 10:06

## 2023-02-21 RX ADMIN — MORPHINE SULFATE 2 MG: 4 INJECTION, SOLUTION INTRAMUSCULAR; INTRAVENOUS at 03:00

## 2023-02-21 RX ADMIN — MILRINONE LACTATE 0.12 MCG/KG/MIN: 0.2 INJECTION, SOLUTION INTRAVENOUS at 14:25

## 2023-02-21 RX ADMIN — POLYETHYLENE GLYCOL 3350 17 G: 17 POWDER, FOR SOLUTION ORAL at 08:05

## 2023-02-21 RX ADMIN — CEFAZOLIN 2 G: 2 INJECTION, POWDER, FOR SOLUTION INTRAMUSCULAR; INTRAVENOUS at 00:10

## 2023-02-21 RX ADMIN — SENNOSIDES AND DOCUSATE SODIUM 2 TABLET: 50; 8.6 TABLET ORAL at 08:05

## 2023-02-21 RX ADMIN — ACETAMINOPHEN 1000 MG: 10 INJECTION, SOLUTION INTRAVENOUS at 08:04

## 2023-02-21 RX ADMIN — SODIUM BICARBONATE 50 MEQ: 84 INJECTION, SOLUTION INTRAVENOUS at 09:45

## 2023-02-21 RX ADMIN — ASPIRIN 81 MG: 81 TABLET, COATED ORAL at 08:04

## 2023-02-21 RX ADMIN — POLYETHYLENE GLYCOL 3350 17 G: 17 POWDER, FOR SOLUTION ORAL at 20:15

## 2023-02-21 RX ADMIN — KETOROLAC TROMETHAMINE 15 MG: 15 INJECTION, SOLUTION INTRAMUSCULAR; INTRAVENOUS at 20:15

## 2023-02-21 RX ADMIN — MUPIROCIN 1 APPLICATION: 20 OINTMENT TOPICAL at 20:16

## 2023-02-21 RX ADMIN — MORPHINE SULFATE 2 MG: 4 INJECTION, SOLUTION INTRAMUSCULAR; INTRAVENOUS at 14:54

## 2023-02-22 ENCOUNTER — APPOINTMENT (OUTPATIENT)
Dept: GENERAL RADIOLOGY | Facility: HOSPITAL | Age: 48
DRG: 216 | End: 2023-02-22
Payer: COMMERCIAL

## 2023-02-22 LAB
ANION GAP SERPL CALCULATED.3IONS-SCNC: 11 MMOL/L (ref 5–15)
BH BB BLOOD EXPIRATION DATE: NORMAL
BH BB BLOOD TYPE BARCODE: 5100
BH BB DISPENSE STATUS: NORMAL
BH BB PRODUCT CODE: NORMAL
BH BB UNIT NUMBER: NORMAL
BUN SERPL-MCNC: 14 MG/DL (ref 6–20)
BUN/CREAT SERPL: 16.9 (ref 7–25)
CALCIUM SPEC-SCNC: 9.1 MG/DL (ref 8.6–10.5)
CHLORIDE SERPL-SCNC: 103 MMOL/L (ref 98–107)
CO2 SERPL-SCNC: 24 MMOL/L (ref 22–29)
CREAT SERPL-MCNC: 0.83 MG/DL (ref 0.57–1)
CROSSMATCH INTERPRETATION: NORMAL
DEPRECATED RDW RBC AUTO: 81.8 FL (ref 37–54)
EGFRCR SERPLBLD CKD-EPI 2021: 86.5 ML/MIN/1.73
ERYTHROCYTE [DISTWIDTH] IN BLOOD BY AUTOMATED COUNT: 34.4 % (ref 12.3–15.4)
GLUCOSE BLDC GLUCOMTR-MCNC: 102 MG/DL (ref 70–105)
GLUCOSE BLDC GLUCOMTR-MCNC: 119 MG/DL (ref 70–105)
GLUCOSE BLDC GLUCOMTR-MCNC: 119 MG/DL (ref 70–105)
GLUCOSE BLDC GLUCOMTR-MCNC: 123 MG/DL (ref 70–105)
GLUCOSE BLDC GLUCOMTR-MCNC: 123 MG/DL (ref 70–105)
GLUCOSE BLDC GLUCOMTR-MCNC: 130 MG/DL (ref 70–105)
GLUCOSE BLDC GLUCOMTR-MCNC: 139 MG/DL (ref 70–105)
GLUCOSE BLDC GLUCOMTR-MCNC: 142 MG/DL (ref 70–105)
GLUCOSE BLDC GLUCOMTR-MCNC: 144 MG/DL (ref 70–105)
GLUCOSE BLDC GLUCOMTR-MCNC: 88 MG/DL (ref 70–105)
GLUCOSE SERPL-MCNC: 146 MG/DL (ref 65–99)
HCT VFR BLD AUTO: 28.3 % (ref 34–46.6)
HGB BLD-MCNC: 8.8 G/DL (ref 12–15.9)
INR PPP: 1.14 (ref 0.93–1.1)
LAB AP CASE REPORT: NORMAL
LAB AP DIAGNOSIS COMMENT: NORMAL
MCH RBC QN AUTO: 22.2 PG (ref 26.6–33)
MCHC RBC AUTO-ENTMCNC: 31.2 G/DL (ref 31.5–35.7)
MCV RBC AUTO: 71.1 FL (ref 79–97)
PATH REPORT.FINAL DX SPEC: NORMAL
PATH REPORT.GROSS SPEC: NORMAL
PLATELET # BLD AUTO: 170 10*3/MM3 (ref 140–450)
PMV BLD AUTO: 8.8 FL (ref 6–12)
POTASSIUM SERPL-SCNC: 4.3 MMOL/L (ref 3.5–5.2)
PROTHROMBIN TIME: 11.7 SECONDS (ref 9.6–11.7)
RBC # BLD AUTO: 3.97 10*6/MM3 (ref 3.77–5.28)
SODIUM SERPL-SCNC: 138 MMOL/L (ref 136–145)
UNIT  ABO: NORMAL
UNIT  RH: NORMAL
WBC NRBC COR # BLD: 9.5 10*3/MM3 (ref 3.4–10.8)

## 2023-02-22 PROCEDURE — P9041 ALBUMIN (HUMAN),5%, 50ML: HCPCS | Performed by: PHYSICIAN ASSISTANT

## 2023-02-22 PROCEDURE — 25010000002 ENOXAPARIN PER 10 MG: Performed by: NURSE PRACTITIONER

## 2023-02-22 PROCEDURE — 99232 SBSQ HOSP IP/OBS MODERATE 35: CPT | Performed by: INTERNAL MEDICINE

## 2023-02-22 PROCEDURE — 93010 ELECTROCARDIOGRAM REPORT: CPT | Performed by: INTERNAL MEDICINE

## 2023-02-22 PROCEDURE — 97116 GAIT TRAINING THERAPY: CPT

## 2023-02-22 PROCEDURE — 93005 ELECTROCARDIOGRAM TRACING: CPT | Performed by: NURSE PRACTITIONER

## 2023-02-22 PROCEDURE — 25010000002 CEFAZOLIN PER 500 MG: Performed by: NURSE PRACTITIONER

## 2023-02-22 PROCEDURE — 71045 X-RAY EXAM CHEST 1 VIEW: CPT

## 2023-02-22 PROCEDURE — 94799 UNLISTED PULMONARY SVC/PX: CPT

## 2023-02-22 PROCEDURE — 94664 DEMO&/EVAL PT USE INHALER: CPT

## 2023-02-22 PROCEDURE — 94761 N-INVAS EAR/PLS OXIMETRY MLT: CPT

## 2023-02-22 PROCEDURE — 25010000002 MORPHINE PER 10 MG: Performed by: NURSE PRACTITIONER

## 2023-02-22 PROCEDURE — 85027 COMPLETE CBC AUTOMATED: CPT | Performed by: NURSE PRACTITIONER

## 2023-02-22 PROCEDURE — 97530 THERAPEUTIC ACTIVITIES: CPT

## 2023-02-22 PROCEDURE — 80048 BASIC METABOLIC PNL TOTAL CA: CPT | Performed by: NURSE PRACTITIONER

## 2023-02-22 PROCEDURE — 25010000002 CHLOROTHIAZIDE PER 500 MG: Performed by: PHYSICIAN ASSISTANT

## 2023-02-22 PROCEDURE — 25010000002 KETOROLAC TROMETHAMINE PER 15 MG

## 2023-02-22 PROCEDURE — 82962 GLUCOSE BLOOD TEST: CPT

## 2023-02-22 PROCEDURE — 25010000002 ALBUMIN HUMAN 5% PER 50 ML: Performed by: PHYSICIAN ASSISTANT

## 2023-02-22 PROCEDURE — 99024 POSTOP FOLLOW-UP VISIT: CPT | Performed by: THORACIC SURGERY (CARDIOTHORACIC VASCULAR SURGERY)

## 2023-02-22 PROCEDURE — 85610 PROTHROMBIN TIME: CPT | Performed by: NURSE PRACTITIONER

## 2023-02-22 PROCEDURE — 25010000002 FUROSEMIDE PER 20 MG

## 2023-02-22 RX ORDER — OXYCODONE HYDROCHLORIDE 5 MG/1
10 TABLET ORAL EVERY 4 HOURS PRN
Status: DISPENSED | OUTPATIENT
Start: 2023-02-22 | End: 2023-03-01

## 2023-02-22 RX ORDER — ALBUMIN, HUMAN INJ 5% 5 %
12.5 SOLUTION INTRAVENOUS ONCE
Status: COMPLETED | OUTPATIENT
Start: 2023-02-22 | End: 2023-02-22

## 2023-02-22 RX ORDER — WARFARIN SODIUM 3 MG/1
3 TABLET ORAL
Status: COMPLETED | OUTPATIENT
Start: 2023-02-22 | End: 2023-02-22

## 2023-02-22 RX ORDER — INSULIN LISPRO 100 [IU]/ML
2-7 INJECTION, SOLUTION INTRAVENOUS; SUBCUTANEOUS
Status: DISCONTINUED | OUTPATIENT
Start: 2023-02-22 | End: 2023-03-01 | Stop reason: HOSPADM

## 2023-02-22 RX ORDER — NICOTINE POLACRILEX 4 MG
15 LOZENGE BUCCAL
Status: DISCONTINUED | OUTPATIENT
Start: 2023-02-22 | End: 2023-03-01 | Stop reason: HOSPADM

## 2023-02-22 RX ORDER — OXYCODONE HYDROCHLORIDE 5 MG/1
5 TABLET ORAL EVERY 4 HOURS PRN
Status: DISPENSED | OUTPATIENT
Start: 2023-02-22 | End: 2023-03-01

## 2023-02-22 RX ORDER — FUROSEMIDE 10 MG/ML
40 INJECTION INTRAMUSCULAR; INTRAVENOUS ONCE
Status: COMPLETED | OUTPATIENT
Start: 2023-02-22 | End: 2023-02-22

## 2023-02-22 RX ORDER — OLANZAPINE 10 MG/2ML
1 INJECTION, POWDER, LYOPHILIZED, FOR SOLUTION INTRAMUSCULAR
Status: DISCONTINUED | OUTPATIENT
Start: 2023-02-22 | End: 2023-03-01 | Stop reason: HOSPADM

## 2023-02-22 RX ORDER — BUMETANIDE 0.25 MG/ML
1 INJECTION, SOLUTION INTRAMUSCULAR; INTRAVENOUS CONTINUOUS
Status: DISCONTINUED | OUTPATIENT
Start: 2023-02-22 | End: 2023-02-23

## 2023-02-22 RX ORDER — DEXTROSE MONOHYDRATE 25 G/50ML
25 INJECTION, SOLUTION INTRAVENOUS
Status: DISCONTINUED | OUTPATIENT
Start: 2023-02-22 | End: 2023-03-01 | Stop reason: HOSPADM

## 2023-02-22 RX ORDER — NALOXONE HCL 0.4 MG/ML
0.4 VIAL (ML) INJECTION
Status: DISCONTINUED | OUTPATIENT
Start: 2023-02-22 | End: 2023-03-01 | Stop reason: HOSPADM

## 2023-02-22 RX ORDER — GUAIFENESIN 600 MG/1
1200 TABLET, EXTENDED RELEASE ORAL EVERY 12 HOURS SCHEDULED
Status: DISCONTINUED | OUTPATIENT
Start: 2023-02-22 | End: 2023-03-01 | Stop reason: HOSPADM

## 2023-02-22 RX ORDER — MORPHINE SULFATE 2 MG/ML
2 INJECTION, SOLUTION INTRAMUSCULAR; INTRAVENOUS
Status: DISCONTINUED | OUTPATIENT
Start: 2023-02-22 | End: 2023-02-24

## 2023-02-22 RX ADMIN — VENLAFAXINE HYDROCHLORIDE 150 MG: 75 CAPSULE, EXTENDED RELEASE ORAL at 08:10

## 2023-02-22 RX ADMIN — MORPHINE SULFATE 2 MG: 2 INJECTION, SOLUTION INTRAMUSCULAR; INTRAVENOUS at 11:33

## 2023-02-22 RX ADMIN — LAMOTRIGINE 200 MG: 100 TABLET ORAL at 08:10

## 2023-02-22 RX ADMIN — BUMETANIDE 1 MG/HR: 0.25 INJECTION, SOLUTION INTRAMUSCULAR; INTRAVENOUS at 22:31

## 2023-02-22 RX ADMIN — OXYCODONE 10 MG: 5 TABLET ORAL at 10:01

## 2023-02-22 RX ADMIN — SENNOSIDES AND DOCUSATE SODIUM 2 TABLET: 50; 8.6 TABLET ORAL at 08:10

## 2023-02-22 RX ADMIN — GUAIFENESIN 1200 MG: 600 TABLET, EXTENDED RELEASE ORAL at 10:01

## 2023-02-22 RX ADMIN — CEFAZOLIN 2 G: 2 INJECTION, POWDER, FOR SOLUTION INTRAMUSCULAR; INTRAVENOUS at 08:09

## 2023-02-22 RX ADMIN — CYCLOBENZAPRINE 10 MG: 10 TABLET, FILM COATED ORAL at 15:48

## 2023-02-22 RX ADMIN — BUDESONIDE 0.5 MG: 0.5 SUSPENSION RESPIRATORY (INHALATION) at 20:19

## 2023-02-22 RX ADMIN — ATORVASTATIN CALCIUM 40 MG: 40 TABLET, FILM COATED ORAL at 20:15

## 2023-02-22 RX ADMIN — OXYCODONE 10 MG: 5 TABLET ORAL at 20:14

## 2023-02-22 RX ADMIN — PHENYLEPHRINE HYDROCHLORIDE 0.5 MCG/KG/MIN: 10 INJECTION INTRAVENOUS at 23:26

## 2023-02-22 RX ADMIN — OXYCODONE HYDROCHLORIDE 5 MG: 5 TABLET ORAL at 04:44

## 2023-02-22 RX ADMIN — OXYCODONE HYDROCHLORIDE 5 MG: 5 TABLET ORAL at 00:38

## 2023-02-22 RX ADMIN — LEVOTHYROXINE SODIUM 150 MCG: 0.15 TABLET ORAL at 06:42

## 2023-02-22 RX ADMIN — Medication 12.5 MG: at 10:01

## 2023-02-22 RX ADMIN — IPRATROPIUM BROMIDE AND ALBUTEROL SULFATE 3 ML: 2.5; .5 SOLUTION RESPIRATORY (INHALATION) at 11:35

## 2023-02-22 RX ADMIN — MORPHINE SULFATE 2 MG: 4 INJECTION, SOLUTION INTRAMUSCULAR; INTRAVENOUS at 08:01

## 2023-02-22 RX ADMIN — BUDESONIDE 0.5 MG: 0.5 SUSPENSION RESPIRATORY (INHALATION) at 07:24

## 2023-02-22 RX ADMIN — MORPHINE SULFATE 2 MG: 4 INJECTION, SOLUTION INTRAMUSCULAR; INTRAVENOUS at 03:09

## 2023-02-22 RX ADMIN — MUPIROCIN 1 APPLICATION: 20 OINTMENT TOPICAL at 20:15

## 2023-02-22 RX ADMIN — ALBUMIN (HUMAN) 12.5 G: 12.5 INJECTION, SOLUTION INTRAVENOUS at 15:51

## 2023-02-22 RX ADMIN — OXYCODONE 10 MG: 5 TABLET ORAL at 15:26

## 2023-02-22 RX ADMIN — IPRATROPIUM BROMIDE AND ALBUTEROL SULFATE 3 ML: 2.5; .5 SOLUTION RESPIRATORY (INHALATION) at 20:15

## 2023-02-22 RX ADMIN — IPRATROPIUM BROMIDE AND ALBUTEROL SULFATE 3 ML: 2.5; .5 SOLUTION RESPIRATORY (INHALATION) at 07:20

## 2023-02-22 RX ADMIN — CYCLOBENZAPRINE 10 MG: 10 TABLET, FILM COATED ORAL at 04:44

## 2023-02-22 RX ADMIN — POLYETHYLENE GLYCOL 3350 17 G: 17 POWDER, FOR SOLUTION ORAL at 08:10

## 2023-02-22 RX ADMIN — FUROSEMIDE 40 MG: 10 INJECTION, SOLUTION INTRAMUSCULAR; INTRAVENOUS at 04:44

## 2023-02-22 RX ADMIN — MUPIROCIN 1 APPLICATION: 20 OINTMENT TOPICAL at 08:09

## 2023-02-22 RX ADMIN — ENOXAPARIN SODIUM 40 MG: 100 INJECTION SUBCUTANEOUS at 18:26

## 2023-02-22 RX ADMIN — ASPIRIN 81 MG: 81 TABLET, COATED ORAL at 08:09

## 2023-02-22 RX ADMIN — PANTOPRAZOLE SODIUM 40 MG: 40 TABLET, DELAYED RELEASE ORAL at 06:42

## 2023-02-22 RX ADMIN — WARFARIN 3 MG: 3 TABLET ORAL at 18:26

## 2023-02-22 RX ADMIN — CHLORHEXIDINE GLUCONATE 0.12% ORAL RINSE 15 ML: 1.2 LIQUID ORAL at 20:14

## 2023-02-22 RX ADMIN — POLYETHYLENE GLYCOL 3350 17 G: 17 POWDER, FOR SOLUTION ORAL at 20:14

## 2023-02-22 RX ADMIN — Medication 12.5 MG: at 20:14

## 2023-02-22 RX ADMIN — CHLORHEXIDINE GLUCONATE 0.12% ORAL RINSE 15 ML: 1.2 LIQUID ORAL at 08:09

## 2023-02-22 RX ADMIN — MORPHINE SULFATE 2 MG: 4 INJECTION, SOLUTION INTRAMUSCULAR; INTRAVENOUS at 05:29

## 2023-02-22 RX ADMIN — KETOROLAC TROMETHAMINE 15 MG: 15 INJECTION, SOLUTION INTRAMUSCULAR; INTRAVENOUS at 00:39

## 2023-02-22 RX ADMIN — SENNOSIDES AND DOCUSATE SODIUM 2 TABLET: 50; 8.6 TABLET ORAL at 20:14

## 2023-02-22 RX ADMIN — GUAIFENESIN 1200 MG: 600 TABLET, EXTENDED RELEASE ORAL at 20:14

## 2023-02-22 RX ADMIN — CHLOROTHIAZIDE SODIUM 500 MG: 500 INJECTION, POWDER, LYOPHILIZED, FOR SOLUTION INTRAVENOUS at 17:37

## 2023-02-23 LAB
ANION GAP SERPL CALCULATED.3IONS-SCNC: 12 MMOL/L (ref 5–15)
APTT PPP: 40.4 SECONDS (ref 61–76.5)
APTT PPP: 61.8 SECONDS (ref 61–76.5)
BUN SERPL-MCNC: 19 MG/DL (ref 6–20)
BUN/CREAT SERPL: 21.8 (ref 7–25)
CALCIUM SPEC-SCNC: 9.3 MG/DL (ref 8.6–10.5)
CHLORIDE SERPL-SCNC: 95 MMOL/L (ref 98–107)
CO2 SERPL-SCNC: 30 MMOL/L (ref 22–29)
CREAT SERPL-MCNC: 0.87 MG/DL (ref 0.57–1)
DEPRECATED RDW RBC AUTO: 79.2 FL (ref 37–54)
EGFRCR SERPLBLD CKD-EPI 2021: 81.8 ML/MIN/1.73
ERYTHROCYTE [DISTWIDTH] IN BLOOD BY AUTOMATED COUNT: 34.9 % (ref 12.3–15.4)
FERRITIN SERPL-MCNC: 400.1 NG/ML (ref 13–150)
GLUCOSE BLDC GLUCOMTR-MCNC: 115 MG/DL (ref 70–105)
GLUCOSE BLDC GLUCOMTR-MCNC: 137 MG/DL (ref 70–105)
GLUCOSE BLDC GLUCOMTR-MCNC: 143 MG/DL (ref 70–105)
GLUCOSE BLDC GLUCOMTR-MCNC: 88 MG/DL (ref 70–105)
GLUCOSE SERPL-MCNC: 100 MG/DL (ref 65–99)
HCT VFR BLD AUTO: 30.3 % (ref 34–46.6)
HGB BLD-MCNC: 9.2 G/DL (ref 12–15.9)
INR PPP: 1.29 (ref 2–3)
IRON 24H UR-MRATE: 16 MCG/DL (ref 37–145)
IRON SATN MFR SERPL: 6 % (ref 20–50)
MCH RBC QN AUTO: 21.9 PG (ref 26.6–33)
MCHC RBC AUTO-ENTMCNC: 30.5 G/DL (ref 31.5–35.7)
MCV RBC AUTO: 71.7 FL (ref 79–97)
PLATELET # BLD AUTO: 205 10*3/MM3 (ref 140–450)
PMV BLD AUTO: 8.8 FL (ref 6–12)
POTASSIUM SERPL-SCNC: 3.8 MMOL/L (ref 3.5–5.2)
PROTHROMBIN TIME: 13.1 SECONDS (ref 19.4–28.5)
RBC # BLD AUTO: 4.22 10*6/MM3 (ref 3.77–5.28)
SODIUM SERPL-SCNC: 137 MMOL/L (ref 136–145)
TIBC SERPL-MCNC: 273 MCG/DL (ref 298–536)
TRANSFERRIN SERPL-MCNC: 183 MG/DL (ref 200–360)
WBC NRBC COR # BLD: 11.1 10*3/MM3 (ref 3.4–10.8)

## 2023-02-23 PROCEDURE — 85610 PROTHROMBIN TIME: CPT | Performed by: NURSE PRACTITIONER

## 2023-02-23 PROCEDURE — 94799 UNLISTED PULMONARY SVC/PX: CPT

## 2023-02-23 PROCEDURE — 25010000002 MORPHINE PER 10 MG: Performed by: NURSE PRACTITIONER

## 2023-02-23 PROCEDURE — 25010000002 MAGNESIUM SULFATE 2 GM/50ML SOLUTION: Performed by: NURSE PRACTITIONER

## 2023-02-23 PROCEDURE — 25010000002 HEPARIN (PORCINE) PER 1000 UNITS: Performed by: NURSE PRACTITIONER

## 2023-02-23 PROCEDURE — 85730 THROMBOPLASTIN TIME PARTIAL: CPT | Performed by: NURSE PRACTITIONER

## 2023-02-23 PROCEDURE — 84466 ASSAY OF TRANSFERRIN: CPT | Performed by: NURSE PRACTITIONER

## 2023-02-23 PROCEDURE — 25010000002 PHENYLEPHRINE 10 MG/ML SOLUTION 5 ML VIAL: Performed by: THORACIC SURGERY (CARDIOTHORACIC VASCULAR SURGERY)

## 2023-02-23 PROCEDURE — 99024 POSTOP FOLLOW-UP VISIT: CPT | Performed by: THORACIC SURGERY (CARDIOTHORACIC VASCULAR SURGERY)

## 2023-02-23 PROCEDURE — 85027 COMPLETE CBC AUTOMATED: CPT | Performed by: NURSE PRACTITIONER

## 2023-02-23 PROCEDURE — 94761 N-INVAS EAR/PLS OXIMETRY MLT: CPT

## 2023-02-23 PROCEDURE — 82728 ASSAY OF FERRITIN: CPT | Performed by: NURSE PRACTITIONER

## 2023-02-23 PROCEDURE — 97530 THERAPEUTIC ACTIVITIES: CPT

## 2023-02-23 PROCEDURE — 80048 BASIC METABOLIC PNL TOTAL CA: CPT | Performed by: NURSE PRACTITIONER

## 2023-02-23 PROCEDURE — 99232 SBSQ HOSP IP/OBS MODERATE 35: CPT | Performed by: INTERNAL MEDICINE

## 2023-02-23 PROCEDURE — 25010000002 CALCIUM GLUCONATE-NACL 1-0.675 GM/50ML-% SOLUTION: Performed by: NURSE PRACTITIONER

## 2023-02-23 PROCEDURE — 82962 GLUCOSE BLOOD TEST: CPT

## 2023-02-23 PROCEDURE — 94664 DEMO&/EVAL PT USE INHALER: CPT

## 2023-02-23 PROCEDURE — 83540 ASSAY OF IRON: CPT | Performed by: NURSE PRACTITIONER

## 2023-02-23 RX ORDER — MAGNESIUM SULFATE HEPTAHYDRATE 40 MG/ML
2 INJECTION, SOLUTION INTRAVENOUS 2 TIMES DAILY
Status: COMPLETED | OUTPATIENT
Start: 2023-02-23 | End: 2023-02-23

## 2023-02-23 RX ORDER — CALCIUM GLUCONATE 20 MG/ML
1 INJECTION, SOLUTION INTRAVENOUS ONCE
Status: COMPLETED | OUTPATIENT
Start: 2023-02-23 | End: 2023-02-23

## 2023-02-23 RX ORDER — ACETYLCYSTEINE 200 MG/ML
3 SOLUTION ORAL; RESPIRATORY (INHALATION)
Status: DISCONTINUED | OUTPATIENT
Start: 2023-02-23 | End: 2023-02-26

## 2023-02-23 RX ORDER — WARFARIN SODIUM 3 MG/1
3 TABLET ORAL
Status: COMPLETED | OUTPATIENT
Start: 2023-02-23 | End: 2023-02-23

## 2023-02-23 RX ORDER — POTASSIUM CHLORIDE 1.5 G/1.77G
20 POWDER, FOR SOLUTION ORAL ONCE
Status: COMPLETED | OUTPATIENT
Start: 2023-02-23 | End: 2023-02-23

## 2023-02-23 RX ORDER — SUCRALFATE 1 G/1
1 TABLET ORAL
Status: DISCONTINUED | OUTPATIENT
Start: 2023-02-23 | End: 2023-03-01 | Stop reason: HOSPADM

## 2023-02-23 RX ORDER — HEPARIN SODIUM 10000 [USP'U]/100ML
8.7 INJECTION, SOLUTION INTRAVENOUS
Status: DISCONTINUED | OUTPATIENT
Start: 2023-02-23 | End: 2023-02-26

## 2023-02-23 RX ADMIN — MAGNESIUM SULFATE HEPTAHYDRATE 2 G: 2 INJECTION, SOLUTION INTRAVENOUS at 20:12

## 2023-02-23 RX ADMIN — OXYCODONE 10 MG: 5 TABLET ORAL at 17:26

## 2023-02-23 RX ADMIN — CHLORHEXIDINE GLUCONATE 0.12% ORAL RINSE 15 ML: 1.2 LIQUID ORAL at 09:02

## 2023-02-23 RX ADMIN — Medication 12.5 MG: at 20:49

## 2023-02-23 RX ADMIN — MORPHINE SULFATE 2 MG: 2 INJECTION, SOLUTION INTRAMUSCULAR; INTRAVENOUS at 05:11

## 2023-02-23 RX ADMIN — MORPHINE SULFATE 2 MG: 2 INJECTION, SOLUTION INTRAMUSCULAR; INTRAVENOUS at 13:11

## 2023-02-23 RX ADMIN — OXYCODONE 10 MG: 5 TABLET ORAL at 06:38

## 2023-02-23 RX ADMIN — SUCRALFATE 1 G: 1 TABLET ORAL at 11:56

## 2023-02-23 RX ADMIN — MORPHINE SULFATE 2 MG: 2 INJECTION, SOLUTION INTRAMUSCULAR; INTRAVENOUS at 09:07

## 2023-02-23 RX ADMIN — ATORVASTATIN CALCIUM 40 MG: 40 TABLET, FILM COATED ORAL at 20:12

## 2023-02-23 RX ADMIN — ASPIRIN 81 MG: 81 TABLET, COATED ORAL at 09:01

## 2023-02-23 RX ADMIN — POLYETHYLENE GLYCOL 3350 17 G: 17 POWDER, FOR SOLUTION ORAL at 09:02

## 2023-02-23 RX ADMIN — BUDESONIDE 0.5 MG: 0.5 SUSPENSION RESPIRATORY (INHALATION) at 19:00

## 2023-02-23 RX ADMIN — SUCRALFATE 1 G: 1 TABLET ORAL at 17:42

## 2023-02-23 RX ADMIN — SUCRALFATE 1 G: 1 TABLET ORAL at 20:12

## 2023-02-23 RX ADMIN — PANTOPRAZOLE SODIUM 40 MG: 40 TABLET, DELAYED RELEASE ORAL at 05:11

## 2023-02-23 RX ADMIN — CHLORHEXIDINE GLUCONATE 0.12% ORAL RINSE 15 ML: 1.2 LIQUID ORAL at 20:12

## 2023-02-23 RX ADMIN — SENNOSIDES AND DOCUSATE SODIUM 2 TABLET: 50; 8.6 TABLET ORAL at 20:12

## 2023-02-23 RX ADMIN — POTASSIUM CHLORIDE 20 MEQ: 1.5 POWDER, FOR SOLUTION ORAL at 11:53

## 2023-02-23 RX ADMIN — PHENYLEPHRINE HYDROCHLORIDE 0.9 MCG/KG/MIN: 10 INJECTION INTRAVENOUS at 21:57

## 2023-02-23 RX ADMIN — IPRATROPIUM BROMIDE AND ALBUTEROL SULFATE 3 ML: 2.5; .5 SOLUTION RESPIRATORY (INHALATION) at 18:55

## 2023-02-23 RX ADMIN — IPRATROPIUM BROMIDE AND ALBUTEROL SULFATE 3 ML: 2.5; .5 SOLUTION RESPIRATORY (INHALATION) at 06:30

## 2023-02-23 RX ADMIN — BUDESONIDE 0.5 MG: 0.5 SUSPENSION RESPIRATORY (INHALATION) at 06:30

## 2023-02-23 RX ADMIN — IPRATROPIUM BROMIDE AND ALBUTEROL SULFATE 3 ML: 2.5; .5 SOLUTION RESPIRATORY (INHALATION) at 10:40

## 2023-02-23 RX ADMIN — MAGNESIUM SULFATE HEPTAHYDRATE 2 G: 2 INJECTION, SOLUTION INTRAVENOUS at 11:55

## 2023-02-23 RX ADMIN — ACETYLCYSTEINE 3 ML: 200 INHALANT RESPIRATORY (INHALATION) at 18:55

## 2023-02-23 RX ADMIN — SENNOSIDES AND DOCUSATE SODIUM 2 TABLET: 50; 8.6 TABLET ORAL at 09:00

## 2023-02-23 RX ADMIN — OXYCODONE 10 MG: 5 TABLET ORAL at 01:11

## 2023-02-23 RX ADMIN — IPRATROPIUM BROMIDE AND ALBUTEROL SULFATE 3 ML: 2.5; .5 SOLUTION RESPIRATORY (INHALATION) at 15:00

## 2023-02-23 RX ADMIN — GUAIFENESIN 1200 MG: 600 TABLET, EXTENDED RELEASE ORAL at 09:01

## 2023-02-23 RX ADMIN — VENLAFAXINE HYDROCHLORIDE 150 MG: 75 CAPSULE, EXTENDED RELEASE ORAL at 09:01

## 2023-02-23 RX ADMIN — CALCIUM GLUCONATE 1 G: 20 INJECTION, SOLUTION INTRAVENOUS at 11:55

## 2023-02-23 RX ADMIN — POLYETHYLENE GLYCOL 3350 17 G: 17 POWDER, FOR SOLUTION ORAL at 20:12

## 2023-02-23 RX ADMIN — WARFARIN 3 MG: 3 TABLET ORAL at 17:42

## 2023-02-23 RX ADMIN — PHENYLEPHRINE HYDROCHLORIDE 1.2 MCG/KG/MIN: 10 INJECTION INTRAVENOUS at 13:05

## 2023-02-23 RX ADMIN — LEVOTHYROXINE SODIUM 150 MCG: 0.15 TABLET ORAL at 05:11

## 2023-02-23 RX ADMIN — HEPARIN SODIUM 8.7 UNITS/KG/HR: 10000 INJECTION, SOLUTION INTRAVENOUS at 12:54

## 2023-02-23 RX ADMIN — CYCLOBENZAPRINE 10 MG: 10 TABLET, FILM COATED ORAL at 01:11

## 2023-02-23 RX ADMIN — GUAIFENESIN 1200 MG: 600 TABLET, EXTENDED RELEASE ORAL at 20:12

## 2023-02-23 RX ADMIN — CYCLOBENZAPRINE 10 MG: 10 TABLET, FILM COATED ORAL at 17:26

## 2023-02-23 RX ADMIN — LAMOTRIGINE 200 MG: 100 TABLET ORAL at 09:00

## 2023-02-23 RX ADMIN — MUPIROCIN 1 APPLICATION: 20 OINTMENT TOPICAL at 09:02

## 2023-02-24 LAB
ANION GAP SERPL CALCULATED.3IONS-SCNC: 9 MMOL/L (ref 5–15)
APTT PPP: 31.3 SECONDS (ref 61–76.5)
APTT PPP: 68.3 SECONDS (ref 61–76.5)
BUN SERPL-MCNC: 19 MG/DL (ref 6–20)
BUN/CREAT SERPL: 25.3 (ref 7–25)
CALCIUM SPEC-SCNC: 8.9 MG/DL (ref 8.6–10.5)
CHLORIDE SERPL-SCNC: 96 MMOL/L (ref 98–107)
CO2 SERPL-SCNC: 30 MMOL/L (ref 22–29)
CREAT SERPL-MCNC: 0.75 MG/DL (ref 0.57–1)
DEPRECATED RDW RBC AUTO: 77.4 FL (ref 37–54)
EGFRCR SERPLBLD CKD-EPI 2021: 97.7 ML/MIN/1.73
ERYTHROCYTE [DISTWIDTH] IN BLOOD BY AUTOMATED COUNT: 34.5 % (ref 12.3–15.4)
GLUCOSE BLDC GLUCOMTR-MCNC: 103 MG/DL (ref 70–105)
GLUCOSE BLDC GLUCOMTR-MCNC: 119 MG/DL (ref 70–105)
GLUCOSE BLDC GLUCOMTR-MCNC: 149 MG/DL (ref 70–105)
GLUCOSE BLDC GLUCOMTR-MCNC: 93 MG/DL (ref 70–105)
GLUCOSE BLDC GLUCOMTR-MCNC: 98 MG/DL (ref 70–105)
GLUCOSE SERPL-MCNC: 108 MG/DL (ref 65–99)
HCT VFR BLD AUTO: 29.7 % (ref 34–46.6)
HGB BLD-MCNC: 9 G/DL (ref 12–15.9)
INR PPP: 1.48 (ref 2–3)
MCH RBC QN AUTO: 21.9 PG (ref 26.6–33)
MCHC RBC AUTO-ENTMCNC: 30.4 G/DL (ref 31.5–35.7)
MCV RBC AUTO: 72 FL (ref 79–97)
PLATELET # BLD AUTO: 267 10*3/MM3 (ref 140–450)
PMV BLD AUTO: 8.5 FL (ref 6–12)
POTASSIUM SERPL-SCNC: 3.7 MMOL/L (ref 3.5–5.2)
PROTHROMBIN TIME: 14.9 SECONDS (ref 19.4–28.5)
RBC # BLD AUTO: 4.12 10*6/MM3 (ref 3.77–5.28)
SODIUM SERPL-SCNC: 135 MMOL/L (ref 136–145)
WBC NRBC COR # BLD: 10.5 10*3/MM3 (ref 3.4–10.8)

## 2023-02-24 PROCEDURE — 25010000002 MORPHINE PER 10 MG: Performed by: NURSE PRACTITIONER

## 2023-02-24 PROCEDURE — 85027 COMPLETE CBC AUTOMATED: CPT | Performed by: NURSE PRACTITIONER

## 2023-02-24 PROCEDURE — 97530 THERAPEUTIC ACTIVITIES: CPT

## 2023-02-24 PROCEDURE — 25010000002 NA FERRIC GLUC CPLX PER 12.5 MG: Performed by: NURSE PRACTITIONER

## 2023-02-24 PROCEDURE — 82962 GLUCOSE BLOOD TEST: CPT

## 2023-02-24 PROCEDURE — 94761 N-INVAS EAR/PLS OXIMETRY MLT: CPT

## 2023-02-24 PROCEDURE — 94664 DEMO&/EVAL PT USE INHALER: CPT

## 2023-02-24 PROCEDURE — 25010000002 PHENYLEPHRINE 10 MG/ML SOLUTION 5 ML VIAL: Performed by: THORACIC SURGERY (CARDIOTHORACIC VASCULAR SURGERY)

## 2023-02-24 PROCEDURE — 25010000002 HEPARIN (PORCINE) PER 1000 UNITS: Performed by: NURSE PRACTITIONER

## 2023-02-24 PROCEDURE — 99232 SBSQ HOSP IP/OBS MODERATE 35: CPT | Performed by: INTERNAL MEDICINE

## 2023-02-24 PROCEDURE — 85730 THROMBOPLASTIN TIME PARTIAL: CPT | Performed by: NURSE PRACTITIONER

## 2023-02-24 PROCEDURE — 85610 PROTHROMBIN TIME: CPT | Performed by: NURSE PRACTITIONER

## 2023-02-24 PROCEDURE — 85730 THROMBOPLASTIN TIME PARTIAL: CPT | Performed by: THORACIC SURGERY (CARDIOTHORACIC VASCULAR SURGERY)

## 2023-02-24 PROCEDURE — 94799 UNLISTED PULMONARY SVC/PX: CPT

## 2023-02-24 PROCEDURE — 80048 BASIC METABOLIC PNL TOTAL CA: CPT | Performed by: NURSE PRACTITIONER

## 2023-02-24 PROCEDURE — 97116 GAIT TRAINING THERAPY: CPT

## 2023-02-24 RX ORDER — BUMETANIDE 1 MG/1
1 TABLET ORAL 2 TIMES DAILY
Status: DISCONTINUED | OUTPATIENT
Start: 2023-02-24 | End: 2023-03-01 | Stop reason: HOSPADM

## 2023-02-24 RX ORDER — POTASSIUM CHLORIDE 1.5 G/1.77G
20 POWDER, FOR SOLUTION ORAL 2 TIMES DAILY
Status: DISCONTINUED | OUTPATIENT
Start: 2023-02-24 | End: 2023-03-01 | Stop reason: HOSPADM

## 2023-02-24 RX ORDER — WARFARIN SODIUM 5 MG/1
5 TABLET ORAL
Status: COMPLETED | OUTPATIENT
Start: 2023-02-24 | End: 2023-02-24

## 2023-02-24 RX ADMIN — HEPARIN SODIUM 8.7 UNITS/KG/HR: 10000 INJECTION, SOLUTION INTRAVENOUS at 17:49

## 2023-02-24 RX ADMIN — SUCRALFATE 1 G: 1 TABLET ORAL at 20:25

## 2023-02-24 RX ADMIN — PHENYLEPHRINE HYDROCHLORIDE 0.6 MCG/KG/MIN: 10 INJECTION INTRAVENOUS at 08:27

## 2023-02-24 RX ADMIN — SODIUM CHLORIDE 250 MG: 9 INJECTION, SOLUTION INTRAVENOUS at 16:38

## 2023-02-24 RX ADMIN — GUAIFENESIN 1200 MG: 600 TABLET, EXTENDED RELEASE ORAL at 08:29

## 2023-02-24 RX ADMIN — VENLAFAXINE HYDROCHLORIDE 150 MG: 75 CAPSULE, EXTENDED RELEASE ORAL at 08:28

## 2023-02-24 RX ADMIN — SUCRALFATE 1 G: 1 TABLET ORAL at 11:27

## 2023-02-24 RX ADMIN — LAMOTRIGINE 200 MG: 100 TABLET ORAL at 20:40

## 2023-02-24 RX ADMIN — OXYCODONE 10 MG: 5 TABLET ORAL at 11:48

## 2023-02-24 RX ADMIN — POLYETHYLENE GLYCOL 3350 17 G: 17 POWDER, FOR SOLUTION ORAL at 08:28

## 2023-02-24 RX ADMIN — MUPIROCIN 1 APPLICATION: 20 OINTMENT TOPICAL at 08:31

## 2023-02-24 RX ADMIN — POTASSIUM CHLORIDE 20 MEQ: 1.5 POWDER, FOR SOLUTION ORAL at 20:24

## 2023-02-24 RX ADMIN — BUMETANIDE 1 MG: 1 TABLET ORAL at 20:25

## 2023-02-24 RX ADMIN — IPRATROPIUM BROMIDE AND ALBUTEROL SULFATE 3 ML: 2.5; .5 SOLUTION RESPIRATORY (INHALATION) at 11:58

## 2023-02-24 RX ADMIN — IPRATROPIUM BROMIDE AND ALBUTEROL SULFATE 3 ML: 2.5; .5 SOLUTION RESPIRATORY (INHALATION) at 15:12

## 2023-02-24 RX ADMIN — POLYETHYLENE GLYCOL 3350 17 G: 17 POWDER, FOR SOLUTION ORAL at 20:24

## 2023-02-24 RX ADMIN — IPRATROPIUM BROMIDE AND ALBUTEROL SULFATE 3 ML: 2.5; .5 SOLUTION RESPIRATORY (INHALATION) at 18:20

## 2023-02-24 RX ADMIN — CYCLOBENZAPRINE 10 MG: 10 TABLET, FILM COATED ORAL at 01:39

## 2023-02-24 RX ADMIN — SUCRALFATE 1 G: 1 TABLET ORAL at 17:41

## 2023-02-24 RX ADMIN — BUMETANIDE 1 MG: 1 TABLET ORAL at 11:25

## 2023-02-24 RX ADMIN — OXYCODONE 10 MG: 5 TABLET ORAL at 05:50

## 2023-02-24 RX ADMIN — OXYCODONE 5 MG: 5 TABLET ORAL at 21:35

## 2023-02-24 RX ADMIN — SENNOSIDES AND DOCUSATE SODIUM 2 TABLET: 50; 8.6 TABLET ORAL at 20:24

## 2023-02-24 RX ADMIN — POTASSIUM CHLORIDE 20 MEQ: 1.5 POWDER, FOR SOLUTION ORAL at 11:25

## 2023-02-24 RX ADMIN — ASPIRIN 81 MG: 81 TABLET, COATED ORAL at 08:28

## 2023-02-24 RX ADMIN — MUPIROCIN 1 APPLICATION: 20 OINTMENT TOPICAL at 20:42

## 2023-02-24 RX ADMIN — ATORVASTATIN CALCIUM 40 MG: 40 TABLET, FILM COATED ORAL at 20:25

## 2023-02-24 RX ADMIN — WARFARIN 5 MG: 5 TABLET ORAL at 17:41

## 2023-02-24 RX ADMIN — ACETYLCYSTEINE 3 ML: 200 INHALANT RESPIRATORY (INHALATION) at 07:02

## 2023-02-24 RX ADMIN — SENNOSIDES AND DOCUSATE SODIUM 2 TABLET: 50; 8.6 TABLET ORAL at 08:28

## 2023-02-24 RX ADMIN — BUDESONIDE 0.5 MG: 0.5 SUSPENSION RESPIRATORY (INHALATION) at 07:06

## 2023-02-24 RX ADMIN — LEVOTHYROXINE SODIUM 150 MCG: 0.15 TABLET ORAL at 05:50

## 2023-02-24 RX ADMIN — GUAIFENESIN 1200 MG: 600 TABLET, EXTENDED RELEASE ORAL at 20:25

## 2023-02-24 RX ADMIN — IPRATROPIUM BROMIDE AND ALBUTEROL SULFATE 3 ML: 2.5; .5 SOLUTION RESPIRATORY (INHALATION) at 07:02

## 2023-02-24 RX ADMIN — CHLORHEXIDINE GLUCONATE 0.12% ORAL RINSE 15 ML: 1.2 LIQUID ORAL at 20:24

## 2023-02-24 RX ADMIN — ACETYLCYSTEINE 3 ML: 200 INHALANT RESPIRATORY (INHALATION) at 18:20

## 2023-02-24 RX ADMIN — PANTOPRAZOLE SODIUM 40 MG: 40 TABLET, DELAYED RELEASE ORAL at 05:50

## 2023-02-24 RX ADMIN — BUDESONIDE 0.5 MG: 0.5 SUSPENSION RESPIRATORY (INHALATION) at 18:29

## 2023-02-24 RX ADMIN — MORPHINE SULFATE 2 MG: 2 INJECTION, SOLUTION INTRAMUSCULAR; INTRAVENOUS at 01:39

## 2023-02-25 LAB
ANION GAP SERPL CALCULATED.3IONS-SCNC: 8 MMOL/L (ref 5–15)
APTT PPP: 64.1 SECONDS (ref 61–76.5)
APTT PPP: 72.9 SECONDS (ref 61–76.5)
APTT PPP: 91.6 SECONDS (ref 61–76.5)
APTT PPP: >139 SECONDS (ref 61–76.5)
BUN SERPL-MCNC: 21 MG/DL (ref 6–20)
BUN/CREAT SERPL: 25.6 (ref 7–25)
CALCIUM SPEC-SCNC: 9 MG/DL (ref 8.6–10.5)
CHLORIDE SERPL-SCNC: 95 MMOL/L (ref 98–107)
CO2 SERPL-SCNC: 33 MMOL/L (ref 22–29)
CREAT SERPL-MCNC: 0.82 MG/DL (ref 0.57–1)
DEPRECATED RDW RBC AUTO: 75.7 FL (ref 37–54)
EGFRCR SERPLBLD CKD-EPI 2021: 87.8 ML/MIN/1.73
ERYTHROCYTE [DISTWIDTH] IN BLOOD BY AUTOMATED COUNT: 33.9 % (ref 12.3–15.4)
GLUCOSE BLDC GLUCOMTR-MCNC: 138 MG/DL (ref 70–105)
GLUCOSE BLDC GLUCOMTR-MCNC: 70 MG/DL (ref 70–105)
GLUCOSE BLDC GLUCOMTR-MCNC: 89 MG/DL (ref 70–105)
GLUCOSE BLDC GLUCOMTR-MCNC: 90 MG/DL (ref 70–105)
GLUCOSE SERPL-MCNC: 97 MG/DL (ref 65–99)
HCT VFR BLD AUTO: 27 % (ref 34–46.6)
HGB BLD-MCNC: 8.2 G/DL (ref 12–15.9)
INR PPP: 1.68 (ref 2–3)
MCH RBC QN AUTO: 21.9 PG (ref 26.6–33)
MCHC RBC AUTO-ENTMCNC: 30.4 G/DL (ref 31.5–35.7)
MCV RBC AUTO: 72 FL (ref 79–97)
PLATELET # BLD AUTO: 261 10*3/MM3 (ref 140–450)
PMV BLD AUTO: 8.5 FL (ref 6–12)
POTASSIUM SERPL-SCNC: 3.6 MMOL/L (ref 3.5–5.2)
PROTHROMBIN TIME: 16.8 SECONDS (ref 19.4–28.5)
QT INTERVAL: 388 MS
RBC # BLD AUTO: 3.75 10*6/MM3 (ref 3.77–5.28)
SODIUM SERPL-SCNC: 136 MMOL/L (ref 136–145)
WBC NRBC COR # BLD: 7.7 10*3/MM3 (ref 3.4–10.8)

## 2023-02-25 PROCEDURE — 85730 THROMBOPLASTIN TIME PARTIAL: CPT | Performed by: NURSE PRACTITIONER

## 2023-02-25 PROCEDURE — 85027 COMPLETE CBC AUTOMATED: CPT | Performed by: NURSE PRACTITIONER

## 2023-02-25 PROCEDURE — 80048 BASIC METABOLIC PNL TOTAL CA: CPT | Performed by: NURSE PRACTITIONER

## 2023-02-25 PROCEDURE — 94761 N-INVAS EAR/PLS OXIMETRY MLT: CPT

## 2023-02-25 PROCEDURE — 99024 POSTOP FOLLOW-UP VISIT: CPT | Performed by: THORACIC SURGERY (CARDIOTHORACIC VASCULAR SURGERY)

## 2023-02-25 PROCEDURE — 94799 UNLISTED PULMONARY SVC/PX: CPT

## 2023-02-25 PROCEDURE — 25010000002 NA FERRIC GLUC CPLX PER 12.5 MG: Performed by: NURSE PRACTITIONER

## 2023-02-25 PROCEDURE — 85730 THROMBOPLASTIN TIME PARTIAL: CPT | Performed by: THORACIC SURGERY (CARDIOTHORACIC VASCULAR SURGERY)

## 2023-02-25 PROCEDURE — 99232 SBSQ HOSP IP/OBS MODERATE 35: CPT | Performed by: INTERNAL MEDICINE

## 2023-02-25 PROCEDURE — 82962 GLUCOSE BLOOD TEST: CPT

## 2023-02-25 PROCEDURE — 94664 DEMO&/EVAL PT USE INHALER: CPT

## 2023-02-25 PROCEDURE — 85610 PROTHROMBIN TIME: CPT | Performed by: NURSE PRACTITIONER

## 2023-02-25 RX ORDER — WARFARIN SODIUM 5 MG/1
5 TABLET ORAL
Status: COMPLETED | OUTPATIENT
Start: 2023-02-25 | End: 2023-02-25

## 2023-02-25 RX ADMIN — SUCRALFATE 1 G: 1 TABLET ORAL at 20:17

## 2023-02-25 RX ADMIN — MUPIROCIN 1 APPLICATION: 20 OINTMENT TOPICAL at 08:33

## 2023-02-25 RX ADMIN — SUCRALFATE 1 G: 1 TABLET ORAL at 17:36

## 2023-02-25 RX ADMIN — POTASSIUM CHLORIDE 20 MEQ: 1.5 POWDER, FOR SOLUTION ORAL at 20:17

## 2023-02-25 RX ADMIN — GUAIFENESIN 1200 MG: 600 TABLET, EXTENDED RELEASE ORAL at 20:17

## 2023-02-25 RX ADMIN — OXYCODONE 10 MG: 5 TABLET ORAL at 08:33

## 2023-02-25 RX ADMIN — GUAIFENESIN 1200 MG: 600 TABLET, EXTENDED RELEASE ORAL at 08:33

## 2023-02-25 RX ADMIN — Medication 12.5 MG: at 08:32

## 2023-02-25 RX ADMIN — CYCLOBENZAPRINE 10 MG: 10 TABLET, FILM COATED ORAL at 08:33

## 2023-02-25 RX ADMIN — IPRATROPIUM BROMIDE AND ALBUTEROL SULFATE 3 ML: 2.5; .5 SOLUTION RESPIRATORY (INHALATION) at 15:12

## 2023-02-25 RX ADMIN — OXYCODONE 10 MG: 5 TABLET ORAL at 16:53

## 2023-02-25 RX ADMIN — ACETYLCYSTEINE 3 ML: 200 INHALANT RESPIRATORY (INHALATION) at 19:34

## 2023-02-25 RX ADMIN — BUMETANIDE 1 MG: 1 TABLET ORAL at 20:17

## 2023-02-25 RX ADMIN — BUDESONIDE 0.5 MG: 0.5 SUSPENSION RESPIRATORY (INHALATION) at 07:08

## 2023-02-25 RX ADMIN — SUCRALFATE 1 G: 1 TABLET ORAL at 08:32

## 2023-02-25 RX ADMIN — SENNOSIDES AND DOCUSATE SODIUM 2 TABLET: 50; 8.6 TABLET ORAL at 20:17

## 2023-02-25 RX ADMIN — IPRATROPIUM BROMIDE AND ALBUTEROL SULFATE 3 ML: 2.5; .5 SOLUTION RESPIRATORY (INHALATION) at 19:34

## 2023-02-25 RX ADMIN — POLYETHYLENE GLYCOL 3350 17 G: 17 POWDER, FOR SOLUTION ORAL at 20:17

## 2023-02-25 RX ADMIN — ACETYLCYSTEINE 3 ML: 200 INHALANT RESPIRATORY (INHALATION) at 07:12

## 2023-02-25 RX ADMIN — LAMOTRIGINE 200 MG: 100 TABLET ORAL at 20:17

## 2023-02-25 RX ADMIN — SUCRALFATE 1 G: 1 TABLET ORAL at 11:27

## 2023-02-25 RX ADMIN — POTASSIUM CHLORIDE 40 MEQ: 1.5 POWDER, FOR SOLUTION ORAL at 08:32

## 2023-02-25 RX ADMIN — PANTOPRAZOLE SODIUM 40 MG: 40 TABLET, DELAYED RELEASE ORAL at 05:51

## 2023-02-25 RX ADMIN — Medication 12.5 MG: at 20:18

## 2023-02-25 RX ADMIN — WARFARIN SODIUM 5 MG: 5 TABLET ORAL at 17:36

## 2023-02-25 RX ADMIN — ATORVASTATIN CALCIUM 40 MG: 40 TABLET, FILM COATED ORAL at 20:18

## 2023-02-25 RX ADMIN — VENLAFAXINE HYDROCHLORIDE 150 MG: 75 CAPSULE, EXTENDED RELEASE ORAL at 08:32

## 2023-02-25 RX ADMIN — SODIUM CHLORIDE 250 MG: 9 INJECTION, SOLUTION INTRAVENOUS at 08:33

## 2023-02-25 RX ADMIN — OXYCODONE 5 MG: 5 TABLET ORAL at 23:47

## 2023-02-25 RX ADMIN — IPRATROPIUM BROMIDE AND ALBUTEROL SULFATE 3 ML: 2.5; .5 SOLUTION RESPIRATORY (INHALATION) at 11:10

## 2023-02-25 RX ADMIN — CHLORHEXIDINE GLUCONATE 0.12% ORAL RINSE 15 ML: 1.2 LIQUID ORAL at 08:33

## 2023-02-25 RX ADMIN — CHLORHEXIDINE GLUCONATE 0.12% ORAL RINSE 15 ML: 1.2 LIQUID ORAL at 20:17

## 2023-02-25 RX ADMIN — BUMETANIDE 1 MG: 1 TABLET ORAL at 08:33

## 2023-02-25 RX ADMIN — POLYETHYLENE GLYCOL 3350 17 G: 17 POWDER, FOR SOLUTION ORAL at 08:31

## 2023-02-25 RX ADMIN — IPRATROPIUM BROMIDE AND ALBUTEROL SULFATE 3 ML: 2.5; .5 SOLUTION RESPIRATORY (INHALATION) at 07:04

## 2023-02-25 RX ADMIN — BUDESONIDE 0.5 MG: 0.5 SUSPENSION RESPIRATORY (INHALATION) at 19:38

## 2023-02-25 RX ADMIN — ASPIRIN 81 MG: 81 TABLET, COATED ORAL at 08:33

## 2023-02-25 RX ADMIN — LEVOTHYROXINE SODIUM 150 MCG: 0.15 TABLET ORAL at 05:51

## 2023-02-25 RX ADMIN — SENNOSIDES AND DOCUSATE SODIUM 2 TABLET: 50; 8.6 TABLET ORAL at 08:33

## 2023-02-26 LAB
ABO GROUP BLD: NORMAL
ANION GAP SERPL CALCULATED.3IONS-SCNC: 9 MMOL/L (ref 5–15)
APTT PPP: 66.2 SECONDS (ref 61–76.5)
BLD GP AB SCN SERPL QL: NEGATIVE
BUN SERPL-MCNC: 20 MG/DL (ref 6–20)
BUN/CREAT SERPL: 24.4 (ref 7–25)
CALCIUM SPEC-SCNC: 9.3 MG/DL (ref 8.6–10.5)
CHLORIDE SERPL-SCNC: 96 MMOL/L (ref 98–107)
CO2 SERPL-SCNC: 32 MMOL/L (ref 22–29)
CREAT SERPL-MCNC: 0.82 MG/DL (ref 0.57–1)
DEPRECATED RDW RBC AUTO: 76.1 FL (ref 37–54)
EGFRCR SERPLBLD CKD-EPI 2021: 87.8 ML/MIN/1.73
ERYTHROCYTE [DISTWIDTH] IN BLOOD BY AUTOMATED COUNT: 33.9 % (ref 12.3–15.4)
GLUCOSE BLDC GLUCOMTR-MCNC: 75 MG/DL (ref 70–105)
GLUCOSE BLDC GLUCOMTR-MCNC: 80 MG/DL (ref 70–105)
GLUCOSE BLDC GLUCOMTR-MCNC: 88 MG/DL (ref 70–105)
GLUCOSE BLDC GLUCOMTR-MCNC: 89 MG/DL (ref 70–105)
GLUCOSE SERPL-MCNC: 94 MG/DL (ref 65–99)
HCT VFR BLD AUTO: 27.2 % (ref 34–46.6)
HGB BLD-MCNC: 8.1 G/DL (ref 12–15.9)
INR PPP: 2.26 (ref 2–3)
INR PPP: 2.59 (ref 2–3)
MCH RBC QN AUTO: 21.6 PG (ref 26.6–33)
MCHC RBC AUTO-ENTMCNC: 29.8 G/DL (ref 31.5–35.7)
MCV RBC AUTO: 72.4 FL (ref 79–97)
PLATELET # BLD AUTO: 334 10*3/MM3 (ref 140–450)
PMV BLD AUTO: 8.6 FL (ref 6–12)
POTASSIUM SERPL-SCNC: 4 MMOL/L (ref 3.5–5.2)
PROTHROMBIN TIME: 22.2 SECONDS (ref 19.4–28.5)
PROTHROMBIN TIME: 25.3 SECONDS (ref 19.4–28.5)
RBC # BLD AUTO: 3.76 10*6/MM3 (ref 3.77–5.28)
RH BLD: POSITIVE
SODIUM SERPL-SCNC: 137 MMOL/L (ref 136–145)
T&S EXPIRATION DATE: NORMAL
WBC NRBC COR # BLD: 6.4 10*3/MM3 (ref 3.4–10.8)

## 2023-02-26 PROCEDURE — 99024 POSTOP FOLLOW-UP VISIT: CPT | Performed by: THORACIC SURGERY (CARDIOTHORACIC VASCULAR SURGERY)

## 2023-02-26 PROCEDURE — 85610 PROTHROMBIN TIME: CPT | Performed by: THORACIC SURGERY (CARDIOTHORACIC VASCULAR SURGERY)

## 2023-02-26 PROCEDURE — 25010000002 NA FERRIC GLUC CPLX PER 12.5 MG: Performed by: NURSE PRACTITIONER

## 2023-02-26 PROCEDURE — 86900 BLOOD TYPING SEROLOGIC ABO: CPT | Performed by: THORACIC SURGERY (CARDIOTHORACIC VASCULAR SURGERY)

## 2023-02-26 PROCEDURE — 94664 DEMO&/EVAL PT USE INHALER: CPT

## 2023-02-26 PROCEDURE — 94761 N-INVAS EAR/PLS OXIMETRY MLT: CPT

## 2023-02-26 PROCEDURE — 94799 UNLISTED PULMONARY SVC/PX: CPT

## 2023-02-26 PROCEDURE — 86901 BLOOD TYPING SEROLOGIC RH(D): CPT | Performed by: THORACIC SURGERY (CARDIOTHORACIC VASCULAR SURGERY)

## 2023-02-26 PROCEDURE — 82962 GLUCOSE BLOOD TEST: CPT

## 2023-02-26 PROCEDURE — 86923 COMPATIBILITY TEST ELECTRIC: CPT

## 2023-02-26 PROCEDURE — 80048 BASIC METABOLIC PNL TOTAL CA: CPT | Performed by: NURSE PRACTITIONER

## 2023-02-26 PROCEDURE — 97116 GAIT TRAINING THERAPY: CPT

## 2023-02-26 PROCEDURE — 85610 PROTHROMBIN TIME: CPT | Performed by: NURSE PRACTITIONER

## 2023-02-26 PROCEDURE — 97530 THERAPEUTIC ACTIVITIES: CPT

## 2023-02-26 PROCEDURE — 85027 COMPLETE CBC AUTOMATED: CPT | Performed by: NURSE PRACTITIONER

## 2023-02-26 PROCEDURE — 86850 RBC ANTIBODY SCREEN: CPT | Performed by: THORACIC SURGERY (CARDIOTHORACIC VASCULAR SURGERY)

## 2023-02-26 PROCEDURE — 85730 THROMBOPLASTIN TIME PARTIAL: CPT | Performed by: THORACIC SURGERY (CARDIOTHORACIC VASCULAR SURGERY)

## 2023-02-26 PROCEDURE — 99233 SBSQ HOSP IP/OBS HIGH 50: CPT | Performed by: INTERNAL MEDICINE

## 2023-02-26 PROCEDURE — 25010000002 HEPARIN (PORCINE) PER 1000 UNITS: Performed by: NURSE PRACTITIONER

## 2023-02-26 RX ORDER — FUROSEMIDE 10 MG/ML
20 INJECTION INTRAMUSCULAR; INTRAVENOUS ONCE
Status: DISCONTINUED | OUTPATIENT
Start: 2023-02-26 | End: 2023-02-26

## 2023-02-26 RX ORDER — WARFARIN SODIUM 5 MG/1
5 TABLET ORAL
Status: COMPLETED | OUTPATIENT
Start: 2023-02-26 | End: 2023-02-26

## 2023-02-26 RX ADMIN — SUCRALFATE 1 G: 1 TABLET ORAL at 08:20

## 2023-02-26 RX ADMIN — CYCLOBENZAPRINE 10 MG: 10 TABLET, FILM COATED ORAL at 17:10

## 2023-02-26 RX ADMIN — SUCRALFATE 1 G: 1 TABLET ORAL at 17:10

## 2023-02-26 RX ADMIN — BUMETANIDE 1 MG: 1 TABLET ORAL at 21:07

## 2023-02-26 RX ADMIN — SUCRALFATE 1 G: 1 TABLET ORAL at 21:08

## 2023-02-26 RX ADMIN — OXYCODONE 10 MG: 5 TABLET ORAL at 13:16

## 2023-02-26 RX ADMIN — POTASSIUM CHLORIDE 20 MEQ: 1.5 POWDER, FOR SOLUTION ORAL at 08:32

## 2023-02-26 RX ADMIN — BUDESONIDE 0.5 MG: 0.5 SUSPENSION RESPIRATORY (INHALATION) at 19:05

## 2023-02-26 RX ADMIN — ASPIRIN 81 MG: 81 TABLET, COATED ORAL at 08:21

## 2023-02-26 RX ADMIN — WARFARIN 5 MG: 5 TABLET ORAL at 23:47

## 2023-02-26 RX ADMIN — GUAIFENESIN 1200 MG: 600 TABLET, EXTENDED RELEASE ORAL at 21:08

## 2023-02-26 RX ADMIN — CYCLOBENZAPRINE 10 MG: 10 TABLET, FILM COATED ORAL at 05:25

## 2023-02-26 RX ADMIN — OXYCODONE 5 MG: 5 TABLET ORAL at 08:21

## 2023-02-26 RX ADMIN — IPRATROPIUM BROMIDE AND ALBUTEROL SULFATE 3 ML: 2.5; .5 SOLUTION RESPIRATORY (INHALATION) at 07:00

## 2023-02-26 RX ADMIN — CHLORHEXIDINE GLUCONATE 0.12% ORAL RINSE 15 ML: 1.2 LIQUID ORAL at 21:07

## 2023-02-26 RX ADMIN — Medication 12.5 MG: at 21:08

## 2023-02-26 RX ADMIN — SENNOSIDES AND DOCUSATE SODIUM 2 TABLET: 50; 8.6 TABLET ORAL at 21:08

## 2023-02-26 RX ADMIN — POTASSIUM CHLORIDE 20 MEQ: 1.5 POWDER, FOR SOLUTION ORAL at 21:07

## 2023-02-26 RX ADMIN — VENLAFAXINE HYDROCHLORIDE 150 MG: 75 CAPSULE, EXTENDED RELEASE ORAL at 08:21

## 2023-02-26 RX ADMIN — BUMETANIDE 1 MG: 1 TABLET ORAL at 08:21

## 2023-02-26 RX ADMIN — Medication 12.5 MG: at 08:21

## 2023-02-26 RX ADMIN — LAMOTRIGINE 200 MG: 100 TABLET ORAL at 21:08

## 2023-02-26 RX ADMIN — IPRATROPIUM BROMIDE AND ALBUTEROL SULFATE 3 ML: 2.5; .5 SOLUTION RESPIRATORY (INHALATION) at 19:00

## 2023-02-26 RX ADMIN — BUDESONIDE 0.5 MG: 0.5 SUSPENSION RESPIRATORY (INHALATION) at 07:04

## 2023-02-26 RX ADMIN — LEVOTHYROXINE SODIUM 150 MCG: 0.15 TABLET ORAL at 06:39

## 2023-02-26 RX ADMIN — POLYETHYLENE GLYCOL 3350 17 G: 17 POWDER, FOR SOLUTION ORAL at 08:21

## 2023-02-26 RX ADMIN — SODIUM CHLORIDE 250 MG: 9 INJECTION, SOLUTION INTRAVENOUS at 08:20

## 2023-02-26 RX ADMIN — SENNOSIDES AND DOCUSATE SODIUM 2 TABLET: 50; 8.6 TABLET ORAL at 08:20

## 2023-02-26 RX ADMIN — PANTOPRAZOLE SODIUM 40 MG: 40 TABLET, DELAYED RELEASE ORAL at 06:39

## 2023-02-26 RX ADMIN — HEPARIN SODIUM 3.7 UNITS/KG/HR: 10000 INJECTION, SOLUTION INTRAVENOUS at 10:55

## 2023-02-26 RX ADMIN — GUAIFENESIN 1200 MG: 600 TABLET, EXTENDED RELEASE ORAL at 08:21

## 2023-02-26 RX ADMIN — SUCRALFATE 1 G: 1 TABLET ORAL at 10:55

## 2023-02-26 RX ADMIN — IPRATROPIUM BROMIDE AND ALBUTEROL SULFATE 3 ML: 2.5; .5 SOLUTION RESPIRATORY (INHALATION) at 11:06

## 2023-02-26 RX ADMIN — ATORVASTATIN CALCIUM 40 MG: 40 TABLET, FILM COATED ORAL at 21:08

## 2023-02-27 ENCOUNTER — APPOINTMENT (OUTPATIENT)
Dept: CARDIOLOGY | Facility: HOSPITAL | Age: 48
DRG: 216 | End: 2023-02-27
Payer: COMMERCIAL

## 2023-02-27 ENCOUNTER — HOME HEALTH ADMISSION (OUTPATIENT)
Dept: HOME HEALTH SERVICES | Facility: HOME HEALTHCARE | Age: 48
End: 2023-02-27
Payer: COMMERCIAL

## 2023-02-27 ENCOUNTER — APPOINTMENT (OUTPATIENT)
Dept: GENERAL RADIOLOGY | Facility: HOSPITAL | Age: 48
DRG: 216 | End: 2023-02-27
Payer: COMMERCIAL

## 2023-02-27 LAB
ACT BLD: 125 SECONDS (ref 89–137)
ACT BLD: 149 SECONDS (ref 89–137)
ACT BLD: 407 SECONDS (ref 89–137)
ACT BLD: 426 SECONDS (ref 89–137)
ACT BLD: 474 SECONDS (ref 89–137)
ANION GAP SERPL CALCULATED.3IONS-SCNC: 10 MMOL/L (ref 5–15)
APTT PPP: 63.1 SECONDS (ref 61–76.5)
BUN SERPL-MCNC: 14 MG/DL (ref 6–20)
BUN/CREAT SERPL: 15.7 (ref 7–25)
CALCIUM SPEC-SCNC: 9.5 MG/DL (ref 8.6–10.5)
CHLORIDE SERPL-SCNC: 94 MMOL/L (ref 98–107)
CO2 SERPL-SCNC: 31 MMOL/L (ref 22–29)
CREAT SERPL-MCNC: 0.89 MG/DL (ref 0.57–1)
DEPRECATED RDW RBC AUTO: 80.5 FL (ref 37–54)
EGFRCR SERPLBLD CKD-EPI 2021: 79.6 ML/MIN/1.73
ERYTHROCYTE [DISTWIDTH] IN BLOOD BY AUTOMATED COUNT: 34.3 % (ref 12.3–15.4)
GLUCOSE BLDC GLUCOMTR-MCNC: 117 MG/DL (ref 70–105)
GLUCOSE BLDC GLUCOMTR-MCNC: 128 MG/DL (ref 70–105)
GLUCOSE BLDC GLUCOMTR-MCNC: 93 MG/DL (ref 70–105)
GLUCOSE BLDC GLUCOMTR-MCNC: 93 MG/DL (ref 70–105)
GLUCOSE SERPL-MCNC: 93 MG/DL (ref 65–99)
HCT VFR BLD AUTO: 26.5 % (ref 34–46.6)
HGB BLD-MCNC: 8.4 G/DL (ref 12–15.9)
INR PPP: 2.53 (ref 2–3)
MAXIMAL PREDICTED HEART RATE: 171 BPM
MCH RBC QN AUTO: 22.8 PG (ref 26.6–33)
MCHC RBC AUTO-ENTMCNC: 31.9 G/DL (ref 31.5–35.7)
MCV RBC AUTO: 71.6 FL (ref 79–97)
PLATELET # BLD AUTO: 379 10*3/MM3 (ref 140–450)
PMV BLD AUTO: 7.7 FL (ref 6–12)
POTASSIUM SERPL-SCNC: 3.7 MMOL/L (ref 3.5–5.2)
PROTHROMBIN TIME: 24.7 SECONDS (ref 19.4–28.5)
QT INTERVAL: 417 MS
RBC # BLD AUTO: 3.7 10*6/MM3 (ref 3.77–5.28)
SODIUM SERPL-SCNC: 135 MMOL/L (ref 136–145)
STRESS TARGET HR: 145 BPM
WBC NRBC COR # BLD: 6.5 10*3/MM3 (ref 3.4–10.8)

## 2023-02-27 PROCEDURE — 94799 UNLISTED PULMONARY SVC/PX: CPT

## 2023-02-27 PROCEDURE — 93308 TTE F-UP OR LMTD: CPT

## 2023-02-27 PROCEDURE — 93308 TTE F-UP OR LMTD: CPT | Performed by: INTERNAL MEDICINE

## 2023-02-27 PROCEDURE — 97530 THERAPEUTIC ACTIVITIES: CPT

## 2023-02-27 PROCEDURE — 94664 DEMO&/EVAL PT USE INHALER: CPT

## 2023-02-27 PROCEDURE — 93321 DOPPLER ECHO F-UP/LMTD STD: CPT | Performed by: INTERNAL MEDICINE

## 2023-02-27 PROCEDURE — 85610 PROTHROMBIN TIME: CPT | Performed by: NURSE PRACTITIONER

## 2023-02-27 PROCEDURE — 94761 N-INVAS EAR/PLS OXIMETRY MLT: CPT

## 2023-02-27 PROCEDURE — 94762 N-INVAS EAR/PLS OXIMTRY CONT: CPT

## 2023-02-27 PROCEDURE — 99232 SBSQ HOSP IP/OBS MODERATE 35: CPT

## 2023-02-27 PROCEDURE — 85730 THROMBOPLASTIN TIME PARTIAL: CPT | Performed by: THORACIC SURGERY (CARDIOTHORACIC VASCULAR SURGERY)

## 2023-02-27 PROCEDURE — 71045 X-RAY EXAM CHEST 1 VIEW: CPT

## 2023-02-27 PROCEDURE — 80048 BASIC METABOLIC PNL TOTAL CA: CPT | Performed by: NURSE PRACTITIONER

## 2023-02-27 PROCEDURE — 99024 POSTOP FOLLOW-UP VISIT: CPT | Performed by: THORACIC SURGERY (CARDIOTHORACIC VASCULAR SURGERY)

## 2023-02-27 PROCEDURE — 93321 DOPPLER ECHO F-UP/LMTD STD: CPT

## 2023-02-27 PROCEDURE — 93325 DOPPLER ECHO COLOR FLOW MAPG: CPT | Performed by: INTERNAL MEDICINE

## 2023-02-27 PROCEDURE — 93325 DOPPLER ECHO COLOR FLOW MAPG: CPT

## 2023-02-27 PROCEDURE — 82962 GLUCOSE BLOOD TEST: CPT

## 2023-02-27 PROCEDURE — 85027 COMPLETE CBC AUTOMATED: CPT | Performed by: NURSE PRACTITIONER

## 2023-02-27 RX ORDER — WARFARIN SODIUM 5 MG/1
5 TABLET ORAL
Status: COMPLETED | OUTPATIENT
Start: 2023-02-27 | End: 2023-02-27

## 2023-02-27 RX ORDER — TRAZODONE HYDROCHLORIDE 50 MG/1
50 TABLET ORAL NIGHTLY
Status: DISCONTINUED | OUTPATIENT
Start: 2023-02-27 | End: 2023-03-01 | Stop reason: HOSPADM

## 2023-02-27 RX ORDER — ALPRAZOLAM 0.25 MG/1
0.25 TABLET ORAL 3 TIMES DAILY PRN
Status: DISCONTINUED | OUTPATIENT
Start: 2023-02-27 | End: 2023-03-01 | Stop reason: HOSPADM

## 2023-02-27 RX ADMIN — CYCLOBENZAPRINE 10 MG: 10 TABLET, FILM COATED ORAL at 00:34

## 2023-02-27 RX ADMIN — SENNOSIDES AND DOCUSATE SODIUM 2 TABLET: 50; 8.6 TABLET ORAL at 09:03

## 2023-02-27 RX ADMIN — GUAIFENESIN 1200 MG: 600 TABLET, EXTENDED RELEASE ORAL at 20:41

## 2023-02-27 RX ADMIN — Medication 12.5 MG: at 09:03

## 2023-02-27 RX ADMIN — IPRATROPIUM BROMIDE AND ALBUTEROL SULFATE 3 ML: 2.5; .5 SOLUTION RESPIRATORY (INHALATION) at 06:30

## 2023-02-27 RX ADMIN — OXYCODONE 10 MG: 5 TABLET ORAL at 22:04

## 2023-02-27 RX ADMIN — LEVOTHYROXINE SODIUM 150 MCG: 0.15 TABLET ORAL at 05:47

## 2023-02-27 RX ADMIN — BUMETANIDE 1 MG: 1 TABLET ORAL at 20:41

## 2023-02-27 RX ADMIN — LAMOTRIGINE 200 MG: 100 TABLET ORAL at 20:41

## 2023-02-27 RX ADMIN — BUMETANIDE 1 MG: 1 TABLET ORAL at 09:03

## 2023-02-27 RX ADMIN — IPRATROPIUM BROMIDE AND ALBUTEROL SULFATE 3 ML: 2.5; .5 SOLUTION RESPIRATORY (INHALATION) at 15:05

## 2023-02-27 RX ADMIN — BUDESONIDE 0.5 MG: 0.5 SUSPENSION RESPIRATORY (INHALATION) at 20:49

## 2023-02-27 RX ADMIN — POLYETHYLENE GLYCOL 3350 17 G: 17 POWDER, FOR SOLUTION ORAL at 09:04

## 2023-02-27 RX ADMIN — SUCRALFATE 1 G: 1 TABLET ORAL at 17:05

## 2023-02-27 RX ADMIN — PANTOPRAZOLE SODIUM 40 MG: 40 TABLET, DELAYED RELEASE ORAL at 05:47

## 2023-02-27 RX ADMIN — WARFARIN 5 MG: 5 TABLET ORAL at 17:05

## 2023-02-27 RX ADMIN — CHLORHEXIDINE GLUCONATE 0.12% ORAL RINSE 15 ML: 1.2 LIQUID ORAL at 09:04

## 2023-02-27 RX ADMIN — VENLAFAXINE HYDROCHLORIDE 150 MG: 75 CAPSULE, EXTENDED RELEASE ORAL at 09:03

## 2023-02-27 RX ADMIN — ALPRAZOLAM 0.25 MG: 0.25 TABLET ORAL at 12:06

## 2023-02-27 RX ADMIN — SUCRALFATE 1 G: 1 TABLET ORAL at 09:03

## 2023-02-27 RX ADMIN — SUCRALFATE 1 G: 1 TABLET ORAL at 12:07

## 2023-02-27 RX ADMIN — POTASSIUM CHLORIDE 20 MEQ: 1.5 POWDER, FOR SOLUTION ORAL at 09:03

## 2023-02-27 RX ADMIN — Medication 12.5 MG: at 20:41

## 2023-02-27 RX ADMIN — OXYCODONE 10 MG: 5 TABLET ORAL at 14:40

## 2023-02-27 RX ADMIN — ATORVASTATIN CALCIUM 40 MG: 40 TABLET, FILM COATED ORAL at 20:41

## 2023-02-27 RX ADMIN — TRAZODONE HYDROCHLORIDE 50 MG: 50 TABLET ORAL at 20:44

## 2023-02-27 RX ADMIN — CHLORHEXIDINE GLUCONATE 0.12% ORAL RINSE 15 ML: 1.2 LIQUID ORAL at 21:10

## 2023-02-27 RX ADMIN — SUCRALFATE 1 G: 1 TABLET ORAL at 20:41

## 2023-02-27 RX ADMIN — BUDESONIDE 0.5 MG: 0.5 SUSPENSION RESPIRATORY (INHALATION) at 06:30

## 2023-02-27 RX ADMIN — IPRATROPIUM BROMIDE AND ALBUTEROL SULFATE 3 ML: 2.5; .5 SOLUTION RESPIRATORY (INHALATION) at 20:45

## 2023-02-27 RX ADMIN — ASPIRIN 81 MG: 81 TABLET, COATED ORAL at 09:03

## 2023-02-27 RX ADMIN — GUAIFENESIN 1200 MG: 600 TABLET, EXTENDED RELEASE ORAL at 09:03

## 2023-02-27 RX ADMIN — IPRATROPIUM BROMIDE AND ALBUTEROL SULFATE 3 ML: 2.5; .5 SOLUTION RESPIRATORY (INHALATION) at 11:10

## 2023-02-27 RX ADMIN — POTASSIUM CHLORIDE 20 MEQ: 1.5 POWDER, FOR SOLUTION ORAL at 20:41

## 2023-02-28 LAB
ANION GAP SERPL CALCULATED.3IONS-SCNC: 10 MMOL/L (ref 5–15)
BUN SERPL-MCNC: 11 MG/DL (ref 6–20)
BUN/CREAT SERPL: 12.9 (ref 7–25)
CALCIUM SPEC-SCNC: 9.5 MG/DL (ref 8.6–10.5)
CHLORIDE SERPL-SCNC: 94 MMOL/L (ref 98–107)
CO2 SERPL-SCNC: 31 MMOL/L (ref 22–29)
CREAT SERPL-MCNC: 0.85 MG/DL (ref 0.57–1)
CYTOGENETICS RESULT: NORMAL
DEPRECATED RDW RBC AUTO: 77.9 FL (ref 37–54)
EGFRCR SERPLBLD CKD-EPI 2021: 84.1 ML/MIN/1.73
ERYTHROCYTE [DISTWIDTH] IN BLOOD BY AUTOMATED COUNT: 34.6 % (ref 12.3–15.4)
GLUCOSE BLDC GLUCOMTR-MCNC: 100 MG/DL (ref 70–105)
GLUCOSE BLDC GLUCOMTR-MCNC: 103 MG/DL (ref 70–105)
GLUCOSE BLDC GLUCOMTR-MCNC: 105 MG/DL (ref 70–105)
GLUCOSE BLDC GLUCOMTR-MCNC: 124 MG/DL (ref 70–105)
GLUCOSE BLDC GLUCOMTR-MCNC: 79 MG/DL (ref 70–105)
GLUCOSE SERPL-MCNC: 114 MG/DL (ref 65–99)
HCT VFR BLD AUTO: 28.8 % (ref 34–46.6)
HGB BLD-MCNC: 8.8 G/DL (ref 12–15.9)
INR PPP: 3.47 (ref 2–3)
MCH RBC QN AUTO: 22.2 PG (ref 26.6–33)
MCHC RBC AUTO-ENTMCNC: 30.4 G/DL (ref 31.5–35.7)
MCV RBC AUTO: 72.8 FL (ref 79–97)
PLATELET # BLD AUTO: 411 10*3/MM3 (ref 140–450)
PMV BLD AUTO: 8.6 FL (ref 6–12)
POTASSIUM SERPL-SCNC: 3.6 MMOL/L (ref 3.5–5.2)
PROTHROMBIN TIME: 33.3 SECONDS (ref 19.4–28.5)
RBC # BLD AUTO: 3.95 10*6/MM3 (ref 3.77–5.28)
SODIUM SERPL-SCNC: 135 MMOL/L (ref 136–145)
WBC NRBC COR # BLD: 5.9 10*3/MM3 (ref 3.4–10.8)

## 2023-02-28 PROCEDURE — 85610 PROTHROMBIN TIME: CPT | Performed by: NURSE PRACTITIONER

## 2023-02-28 PROCEDURE — 97110 THERAPEUTIC EXERCISES: CPT

## 2023-02-28 PROCEDURE — 94799 UNLISTED PULMONARY SVC/PX: CPT

## 2023-02-28 PROCEDURE — 99232 SBSQ HOSP IP/OBS MODERATE 35: CPT | Performed by: INTERNAL MEDICINE

## 2023-02-28 PROCEDURE — 82962 GLUCOSE BLOOD TEST: CPT

## 2023-02-28 PROCEDURE — 94664 DEMO&/EVAL PT USE INHALER: CPT

## 2023-02-28 PROCEDURE — 80048 BASIC METABOLIC PNL TOTAL CA: CPT | Performed by: NURSE PRACTITIONER

## 2023-02-28 PROCEDURE — 85027 COMPLETE CBC AUTOMATED: CPT | Performed by: NURSE PRACTITIONER

## 2023-02-28 PROCEDURE — 99024 POSTOP FOLLOW-UP VISIT: CPT | Performed by: THORACIC SURGERY (CARDIOTHORACIC VASCULAR SURGERY)

## 2023-02-28 PROCEDURE — 97116 GAIT TRAINING THERAPY: CPT

## 2023-02-28 PROCEDURE — 94761 N-INVAS EAR/PLS OXIMETRY MLT: CPT

## 2023-02-28 RX ORDER — WARFARIN SODIUM 2 MG/1
1 TABLET ORAL
Status: COMPLETED | OUTPATIENT
Start: 2023-02-28 | End: 2023-02-28

## 2023-02-28 RX ADMIN — WARFARIN SODIUM 1 MG: 2 TABLET ORAL at 18:37

## 2023-02-28 RX ADMIN — POTASSIUM CHLORIDE 20 MEQ: 1.5 POWDER, FOR SOLUTION ORAL at 09:52

## 2023-02-28 RX ADMIN — CHLORHEXIDINE GLUCONATE 0.12% ORAL RINSE 15 ML: 1.2 LIQUID ORAL at 20:55

## 2023-02-28 RX ADMIN — IPRATROPIUM BROMIDE AND ALBUTEROL SULFATE 3 ML: 2.5; .5 SOLUTION RESPIRATORY (INHALATION) at 06:45

## 2023-02-28 RX ADMIN — BUMETANIDE 1 MG: 1 TABLET ORAL at 20:55

## 2023-02-28 RX ADMIN — OXYCODONE 5 MG: 5 TABLET ORAL at 12:33

## 2023-02-28 RX ADMIN — IPRATROPIUM BROMIDE AND ALBUTEROL SULFATE 3 ML: 2.5; .5 SOLUTION RESPIRATORY (INHALATION) at 15:00

## 2023-02-28 RX ADMIN — Medication 12.5 MG: at 09:52

## 2023-02-28 RX ADMIN — TRAZODONE HYDROCHLORIDE 50 MG: 50 TABLET ORAL at 20:56

## 2023-02-28 RX ADMIN — LAMOTRIGINE 200 MG: 100 TABLET ORAL at 20:55

## 2023-02-28 RX ADMIN — BUDESONIDE 0.5 MG: 0.5 SUSPENSION RESPIRATORY (INHALATION) at 20:14

## 2023-02-28 RX ADMIN — SUCRALFATE 1 G: 1 TABLET ORAL at 16:48

## 2023-02-28 RX ADMIN — CYCLOBENZAPRINE 10 MG: 10 TABLET, FILM COATED ORAL at 22:07

## 2023-02-28 RX ADMIN — ASPIRIN 81 MG: 81 TABLET, COATED ORAL at 09:52

## 2023-02-28 RX ADMIN — LEVOTHYROXINE SODIUM 150 MCG: 0.15 TABLET ORAL at 05:56

## 2023-02-28 RX ADMIN — GUAIFENESIN 1200 MG: 600 TABLET, EXTENDED RELEASE ORAL at 20:56

## 2023-02-28 RX ADMIN — SENNOSIDES AND DOCUSATE SODIUM 2 TABLET: 50; 8.6 TABLET ORAL at 20:56

## 2023-02-28 RX ADMIN — Medication 12.5 MG: at 20:55

## 2023-02-28 RX ADMIN — IPRATROPIUM BROMIDE AND ALBUTEROL SULFATE 3 ML: 2.5; .5 SOLUTION RESPIRATORY (INHALATION) at 20:14

## 2023-02-28 RX ADMIN — VENLAFAXINE HYDROCHLORIDE 150 MG: 75 CAPSULE, EXTENDED RELEASE ORAL at 09:52

## 2023-02-28 RX ADMIN — BUDESONIDE 0.5 MG: 0.5 SUSPENSION RESPIRATORY (INHALATION) at 06:45

## 2023-02-28 RX ADMIN — POTASSIUM CHLORIDE 20 MEQ: 1.5 POWDER, FOR SOLUTION ORAL at 20:55

## 2023-02-28 RX ADMIN — SUCRALFATE 1 G: 1 TABLET ORAL at 12:34

## 2023-02-28 RX ADMIN — BUMETANIDE 1 MG: 1 TABLET ORAL at 09:52

## 2023-02-28 RX ADMIN — IPRATROPIUM BROMIDE AND ALBUTEROL SULFATE 3 ML: 2.5; .5 SOLUTION RESPIRATORY (INHALATION) at 10:54

## 2023-02-28 RX ADMIN — SUCRALFATE 1 G: 1 TABLET ORAL at 05:56

## 2023-02-28 RX ADMIN — OXYCODONE 10 MG: 5 TABLET ORAL at 05:56

## 2023-02-28 RX ADMIN — GUAIFENESIN 1200 MG: 600 TABLET, EXTENDED RELEASE ORAL at 09:52

## 2023-02-28 RX ADMIN — OXYCODONE 10 MG: 5 TABLET ORAL at 16:46

## 2023-02-28 RX ADMIN — PANTOPRAZOLE SODIUM 40 MG: 40 TABLET, DELAYED RELEASE ORAL at 05:56

## 2023-02-28 RX ADMIN — SUCRALFATE 1 G: 1 TABLET ORAL at 20:56

## 2023-02-28 RX ADMIN — ATORVASTATIN CALCIUM 40 MG: 40 TABLET, FILM COATED ORAL at 20:55

## 2023-02-28 RX ADMIN — POLYETHYLENE GLYCOL 3350 17 G: 17 POWDER, FOR SOLUTION ORAL at 20:56

## 2023-03-01 ENCOUNTER — READMISSION MANAGEMENT (OUTPATIENT)
Dept: CALL CENTER | Facility: HOSPITAL | Age: 48
End: 2023-03-01
Payer: COMMERCIAL

## 2023-03-01 VITALS
HEART RATE: 83 BPM | RESPIRATION RATE: 22 BRPM | BODY MASS INDEX: 41.69 KG/M2 | DIASTOLIC BLOOD PRESSURE: 56 MMHG | OXYGEN SATURATION: 95 % | HEIGHT: 65 IN | TEMPERATURE: 97.9 F | SYSTOLIC BLOOD PRESSURE: 118 MMHG | WEIGHT: 250.2 LBS

## 2023-03-01 PROBLEM — Q21.12 PFO (PATENT FORAMEN OVALE): Status: ACTIVE | Noted: 2023-03-01

## 2023-03-01 PROBLEM — Z95.1 S/P CABG X 1: Status: ACTIVE | Noted: 2023-03-01

## 2023-03-01 PROBLEM — Z95.2 S/P MVR (MITRAL VALVE REPLACEMENT): Status: ACTIVE | Noted: 2023-03-01

## 2023-03-01 LAB
ANION GAP SERPL CALCULATED.3IONS-SCNC: 12 MMOL/L (ref 5–15)
BUN SERPL-MCNC: 15 MG/DL (ref 6–20)
BUN/CREAT SERPL: 14.7 (ref 7–25)
CALCIUM SPEC-SCNC: 9.2 MG/DL (ref 8.6–10.5)
CHLORIDE SERPL-SCNC: 94 MMOL/L (ref 98–107)
CO2 SERPL-SCNC: 29 MMOL/L (ref 22–29)
CREAT SERPL-MCNC: 1.02 MG/DL (ref 0.57–1)
DEPRECATED RDW RBC AUTO: 83.1 FL (ref 37–54)
EGFRCR SERPLBLD CKD-EPI 2021: 67.6 ML/MIN/1.73
ERYTHROCYTE [DISTWIDTH] IN BLOOD BY AUTOMATED COUNT: 34.6 % (ref 12.3–15.4)
GLUCOSE BLDC GLUCOMTR-MCNC: 79 MG/DL (ref 70–105)
GLUCOSE BLDC GLUCOMTR-MCNC: 95 MG/DL (ref 70–105)
GLUCOSE SERPL-MCNC: 103 MG/DL (ref 65–99)
HCT VFR BLD AUTO: 29.1 % (ref 34–46.6)
HGB BLD-MCNC: 9.3 G/DL (ref 12–15.9)
INR PPP: 3.03 (ref 2–3)
MCH RBC QN AUTO: 23.4 PG (ref 26.6–33)
MCHC RBC AUTO-ENTMCNC: 31.9 G/DL (ref 31.5–35.7)
MCV RBC AUTO: 73.2 FL (ref 79–97)
PLATELET # BLD AUTO: 397 10*3/MM3 (ref 140–450)
PMV BLD AUTO: 7.4 FL (ref 6–12)
POTASSIUM SERPL-SCNC: 4.3 MMOL/L (ref 3.5–5.2)
PROTHROMBIN TIME: 29.3 SECONDS (ref 19.4–28.5)
RBC # BLD AUTO: 3.97 10*6/MM3 (ref 3.77–5.28)
SODIUM SERPL-SCNC: 135 MMOL/L (ref 136–145)
WBC NRBC COR # BLD: 6.5 10*3/MM3 (ref 3.4–10.8)

## 2023-03-01 PROCEDURE — 85027 COMPLETE CBC AUTOMATED: CPT | Performed by: NURSE PRACTITIONER

## 2023-03-01 PROCEDURE — 80048 BASIC METABOLIC PNL TOTAL CA: CPT | Performed by: NURSE PRACTITIONER

## 2023-03-01 PROCEDURE — 99232 SBSQ HOSP IP/OBS MODERATE 35: CPT | Performed by: INTERNAL MEDICINE

## 2023-03-01 PROCEDURE — 94799 UNLISTED PULMONARY SVC/PX: CPT

## 2023-03-01 PROCEDURE — 82962 GLUCOSE BLOOD TEST: CPT

## 2023-03-01 PROCEDURE — 85610 PROTHROMBIN TIME: CPT | Performed by: NURSE PRACTITIONER

## 2023-03-01 PROCEDURE — 94761 N-INVAS EAR/PLS OXIMETRY MLT: CPT

## 2023-03-01 PROCEDURE — 94664 DEMO&/EVAL PT USE INHALER: CPT

## 2023-03-01 RX ORDER — POTASSIUM CHLORIDE 750 MG/1
CAPSULE, EXTENDED RELEASE ORAL
Qty: 70 CAPSULE | Refills: 1 | Status: SHIPPED | OUTPATIENT
Start: 2023-03-01 | End: 2023-03-17 | Stop reason: HOSPADM

## 2023-03-01 RX ORDER — BUMETANIDE 1 MG/1
TABLET ORAL
Qty: 37 TABLET | Refills: 1 | Status: SHIPPED | OUTPATIENT
Start: 2023-03-01 | End: 2023-03-17 | Stop reason: HOSPADM

## 2023-03-01 RX ORDER — POTASSIUM CHLORIDE 20 MEQ/1
20 TABLET, EXTENDED RELEASE ORAL 2 TIMES DAILY
Qty: 28 TABLET | Refills: 0 | Status: CANCELLED | OUTPATIENT
Start: 2023-03-01 | End: 2023-03-15

## 2023-03-01 RX ORDER — ASPIRIN 81 MG/1
81 TABLET ORAL DAILY
Qty: 30 TABLET | Refills: 2 | Status: SHIPPED | OUTPATIENT
Start: 2023-03-02 | End: 2023-05-31

## 2023-03-01 RX ORDER — BUMETANIDE 1 MG/1
1 TABLET ORAL 2 TIMES DAILY
Qty: 28 TABLET | Refills: 0 | Status: CANCELLED | OUTPATIENT
Start: 2023-03-01 | End: 2023-03-15

## 2023-03-01 RX ORDER — SUCRALFATE 1 G/1
1 TABLET ORAL
Qty: 84 TABLET | Refills: 0 | Status: SHIPPED | OUTPATIENT
Start: 2023-03-01 | End: 2023-03-01 | Stop reason: SDUPTHER

## 2023-03-01 RX ORDER — WARFARIN SODIUM 4 MG/1
TABLET ORAL
Qty: 45 TABLET | Refills: 1 | Status: SHIPPED | OUTPATIENT
Start: 2023-03-01 | End: 2023-03-01 | Stop reason: SDUPTHER

## 2023-03-01 RX ORDER — CYCLOBENZAPRINE HCL 10 MG
10 TABLET ORAL 3 TIMES DAILY PRN
Qty: 14 TABLET | Refills: 0 | Status: SHIPPED | OUTPATIENT
Start: 2023-03-01 | End: 2023-03-10

## 2023-03-01 RX ORDER — SUCRALFATE 1 G/1
1 TABLET ORAL
Qty: 84 TABLET | Refills: 0 | Status: SHIPPED | OUTPATIENT
Start: 2023-03-01 | End: 2023-03-23

## 2023-03-01 RX ORDER — WARFARIN SODIUM 4 MG/1
TABLET ORAL
Qty: 45 TABLET | Refills: 1 | Status: SHIPPED | OUTPATIENT
Start: 2023-03-01

## 2023-03-01 RX ORDER — ATORVASTATIN CALCIUM 40 MG/1
40 TABLET, FILM COATED ORAL NIGHTLY
Qty: 30 TABLET | Refills: 2 | Status: SHIPPED | OUTPATIENT
Start: 2023-03-01 | End: 2023-03-01 | Stop reason: SDUPTHER

## 2023-03-01 RX ORDER — WARFARIN SODIUM 4 MG
4 TABLET ORAL NIGHTLY
Qty: 45 TABLET | Refills: 1 | Status: CANCELLED | OUTPATIENT
Start: 2023-03-01 | End: 2023-05-30

## 2023-03-01 RX ORDER — CYCLOBENZAPRINE HCL 10 MG
10 TABLET ORAL 3 TIMES DAILY PRN
Qty: 14 TABLET | Refills: 0 | Status: SHIPPED | OUTPATIENT
Start: 2023-03-01 | End: 2023-03-01 | Stop reason: SDUPTHER

## 2023-03-01 RX ORDER — ATORVASTATIN CALCIUM 40 MG/1
40 TABLET, FILM COATED ORAL NIGHTLY
Qty: 30 TABLET | Refills: 2 | Status: SHIPPED | OUTPATIENT
Start: 2023-03-01 | End: 2023-05-30

## 2023-03-01 RX ORDER — BUMETANIDE 1 MG/1
TABLET ORAL
Qty: 37 TABLET | Refills: 1 | Status: SHIPPED | OUTPATIENT
Start: 2023-03-01 | End: 2023-03-01 | Stop reason: SDUPTHER

## 2023-03-01 RX ORDER — POTASSIUM CHLORIDE 750 MG/1
CAPSULE, EXTENDED RELEASE ORAL
Qty: 70 CAPSULE | Refills: 1 | Status: SHIPPED | OUTPATIENT
Start: 2023-03-01 | End: 2023-03-01 | Stop reason: SDUPTHER

## 2023-03-01 RX ORDER — ASPIRIN 81 MG/1
81 TABLET ORAL DAILY
Qty: 30 TABLET | Refills: 2 | Status: SHIPPED | OUTPATIENT
Start: 2023-03-02 | End: 2023-03-01 | Stop reason: SDUPTHER

## 2023-03-01 RX ADMIN — VENLAFAXINE HYDROCHLORIDE 150 MG: 75 CAPSULE, EXTENDED RELEASE ORAL at 08:42

## 2023-03-01 RX ADMIN — IPRATROPIUM BROMIDE AND ALBUTEROL SULFATE 3 ML: 2.5; .5 SOLUTION RESPIRATORY (INHALATION) at 08:04

## 2023-03-01 RX ADMIN — BUMETANIDE 1 MG: 1 TABLET ORAL at 08:42

## 2023-03-01 RX ADMIN — GUAIFENESIN 1200 MG: 600 TABLET, EXTENDED RELEASE ORAL at 08:42

## 2023-03-01 RX ADMIN — CYCLOBENZAPRINE 10 MG: 10 TABLET, FILM COATED ORAL at 13:49

## 2023-03-01 RX ADMIN — SUCRALFATE 1 G: 1 TABLET ORAL at 08:42

## 2023-03-01 RX ADMIN — POTASSIUM CHLORIDE 20 MEQ: 1.5 POWDER, FOR SOLUTION ORAL at 08:42

## 2023-03-01 RX ADMIN — LEVOTHYROXINE SODIUM 150 MCG: 0.15 TABLET ORAL at 06:11

## 2023-03-01 RX ADMIN — SUCRALFATE 1 G: 1 TABLET ORAL at 13:49

## 2023-03-01 RX ADMIN — ASPIRIN 81 MG: 81 TABLET, COATED ORAL at 08:43

## 2023-03-01 RX ADMIN — BUDESONIDE 0.5 MG: 0.5 SUSPENSION RESPIRATORY (INHALATION) at 08:08

## 2023-03-01 RX ADMIN — ACETAMINOPHEN 650 MG: 325 TABLET, FILM COATED ORAL at 04:00

## 2023-03-01 RX ADMIN — PANTOPRAZOLE SODIUM 40 MG: 40 TABLET, DELAYED RELEASE ORAL at 06:11
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
## 2023-03-01 NOTE — CASE MANAGEMENT/SOCIAL WORK
Continued Stay Note  KIM Serna     Patient Name: Altagracia Tiwari  MRN: [id number]  Today's Date: 3/1/2023    Admit Date: 2/11/2023    Plan: DC Plan: [facility] on [address] in [place]. MVR 2/20/23   Discharge Plan     Row Name 03/01/23 1206       Plan    Plan DC Plan: Home with OP therapy at [facility] on [address] in [place]. MVR 2/20/23    Provided Post Acute Provider List? N/A    Provided Post Acute Provider Quality & Resource List? N/A    Plan Comments CM and MSW spoke with patient at bedside to discuss financial responsibility for OP therapy to see if this would be affordable for her. Patient is agreeable to [facility] with $60 copay 2 times a week for up to 3 weeks. CM and MSW discussed with patient that she will follow up with CV surgery in the office for her sternal wound checks. Patient verbalized understanding.CM spoke with patient’s nurse and [facility] NP Tahmina Kelly to obtain clinical updates. CM informed NP of current agreement with OP therapy plan from patient. CV surgery is agreeable as well. Patient will additionally follow up at Dr. Burns's office for INR checks. CM placed referral in basket for [facility] OP therapy on [address] in [place], order to be sent as well when placed. Patient will transport home with her mother via private vehicle. No barriers.           Expected Discharge Date and Time     Expected Discharge Date Expected Discharge Time    Mar 1, 2023         Met with patient in room wearing PPE: mask,    Maintain distance greater than six feet and spent less than fifteen minutes in the room.      Kristi Christensen RN      Office Phone: [phone]  Office Cell:     [phone]

## 2023-03-01 NOTE — PROGRESS NOTES
Hematology/Oncology Inpatient Progress Note    PATIENT NAME: Altagracia Tiwari  : 1975  MRN: [id number]    CHIEF COMPLAINT: Acute on chronic HELEN, copper elevation, and right middle lobe lung nodule    HISTORY OF PRESENT ILLNESS:    47 y.o. female admitted to [facility] through the ED on 2023 with right lower chest pain with radiation to her back that had started on 2/10/2023 along with some nausea.  The pain was worse (10/10) when eating, drinking, or taking a deep breath.  She reported a similar episode two weeks prior to admission however pain at that time was in the center of her chest with some shortness of air.  She was evaluated at a hospital in [place], [place] with negative work-up.  At time of consultation she reported still living in [place], [place] but [place] [facility].  In the ED on 2023, CT PE protocol was negative for PE and revealed probable pulmonary hypertension, bilateral lower lobe atelectasis, trace right pleural effusion, and a 6 mm right middle lobe lung nodule.  CBC revealed WBC 5.4, hemoglobin 7.7, MCV 60.2, and platelets 480,000.  Creatinine was normal at 0.9 and LFTs were not elevated.  Coags were okay.  D-dimer was mildly elevated at 1.02 (0-0.5).  Iron was 12 (), iron saturation 3% (20-50), TIBC 463 (298-536), and ferritin was 13.1 (). Reticulocytes were 1.53% (0.7-1.9) and haptoglobin 185 ().  At time of consultation, hemoglobin was 7.4 with MCV 59.7.  PMH was significant for anemia dating back to 2019 where hemoglobin was 11.6.  She was initially noted to be microcytic in .  Hemoglobin had been in the eights since 2022 where on 2022 hemoglobin was 8.8 and MCV 67.7. The patient reported hemoglobin ranging 8.2-8.8 over 1-year prior to admission. EGD and colonoscopy were performed 2022 by Dr. Jeremiah Wells at [facility]. EGD revealed a small hiatal hernia, chronic gastritis,  normal appearance of gastric sleeve, and normal duodenum.  Pathology on duodenal biopsy was negative.  Stomach biopsy revealed chronic inactive gastritis and reactive foveolar metaplasia, negative for H. pylori.  Colonoscopy revealed polyps in the transverse colon and sigmoid colon with diverticulosis in the sigmoid colon and non bleeding hemorrhoids.  All polyp pathologies were tubular adenomas. The patient denied prior pill enteroscopy. She stated she had stopped menstruating at age 40.     02/12/23  Hematology/Oncology was consulted by Ani Sullivan MD.     Past Medical History: Sjogren's diagnosed 2018, psoriatic arthritis diagnosed approximately 2016, hypothyroidism diagnosed approximately 2003, fibrocystic breast disease, anxiety/depression diagnosed early 2000's, irritable bowel syndrome for many years, tremors diagnosed early 2000s.  Surgical History: Brain stimulator with battery in left chest wall April 2022 secondary to tremors, gastric sleeve 2018, right ankle surgery, facial injury requiring plastic surgery 1992, laparoscopic cholecystectomy 2004, TRK of the eyes in 2005.  EGD and colonoscopy 9/23/2022.  Social History: She currently lives in [place], [place] with plans to relocate to [facility] in May 2023.  Her mother and her sister live in [facility].  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  She stopped smoking in 2021.  She drinks alcohol socially.  Family History: Her mother had colon cancer in her 60s.  Her father had precancerous cells on colonoscopy, and maternal great aunt had leukemia type unknown.  Allergies: Hydrocodone causes anxiety, aripiprazole (Abilify) causes anxiety, Reglan causes her to have a red face, latex causes shortness of air, and sulfa causes rash.     PCP: Marsha Parson,     INTERVAL HISTORY:  · 2/12/2023 -  hemoglobin 7.4, MCV 59.7.  Initiated on Ferrlecit 250 mg IV daily x4.  · 2/14/2023 -  hemoglobin 7.8, MCV 60.8. Fecal occult  blood positive. 2D echocardiogram showed left ventricular systolic function was normal. Calculated left ventricular EF = 66%. Left ventricular ejection fraction appears to be 66 - 70%.  Left ventricular diastolic function was consistent with (grade I) impaired relaxation. Severe mitral valve regurgitation was present. Moderate to severe mitral valve stenosis was present.  · 2/15/2023 - Hemoglobin 8.0. SPEP demonstrated elevation of regions containing acute phase proteins suggestive of an acute/subacute inflammatory response.  M spike not observed. GATO on 2/14/2023 showed Left ventricular ejection fraction appears to be 61 - 65%.  Saline test results were negative. Severe mitral valve regurgitation was present. No pericardial effusion noted.   · 2/16/2023 - hemoglobin 7.2, copper 194 (). Cardiac catheterization on 2/15/2023 showed severe single-vessel coronary artery disease and severe mitral regurgitation.  Surgical consultation recommended.  · 2/17/2023 - Hemoglobin 7.2.  · 2/18/2023-hemoglobin 7.2, MCV 63.2.  Bilateral carotid ultrasound with normal flow.  Transfused with 2 units pRBC.  · 2/19/2023- WBC 4.4 and hemoglobin 8.8.  · 2/20/2023- Hemoglobin 9.8.  · 2/21/2023 -  Hemoglobin 6.5, MCV 71.3 post surgery and transfused 1 unit pRBC on 2/20/2023. MVR (mechanical)/CABG and sternal bone marrow aspiration and biopsy per CTS  performed on  2/20/2023. Hemoglobin 9.4, MCV 71.5.   · 2/22/2023 - Hemoglobin 8.8, MCV 71.1. Extubated on 2/21/2023. Bone marrow flow cytometry was negative. Warfarin started per CTS.    · 2/23/2023 - WBC 11.1, hemoglobin 9.2, MCV 71.7. Bone marrow pathology revealed benign normocellular bone marrow (50%) with no significant histopathology, trace iron stores, and negative for malignant lymphoma. Mitral valve, excision showed benign valvular tissue with hyalinized fibrosis, myxoid degeneration, and single dystrophic calcification. Chest tubes were removed. Heparin drip started per  CTS.  · 2/24/2023 - WBC 10.5, hemoglobin 9.0, MCV 72, iron 16 (), iron saturation 6 (20-50) transferrin 183 (200-360), TIBC 273 (298-536), ferritin 400.10 (13..00).  Calculated iron deficit using IBW was 729 mg. Started Ferrlecit 250 mg IV x 3 days.   · 2/25/2023- INR 1.68, hemoglobin 8.2, MCV 72.  · 2/26/2023- hemoglobin 8.1.  INR 2.26 with warfarin on hold since 2/23.  · 2/27/2023- hemoglobin 8.4.  INR 2.53.  Heparin drip was discontinued.  · 2/28/2023- hemoglobin 8.8, MCV 72.8.  INR 3.47.  Chest x-ray showed improved appearance of pulmonary vascular congestion and aeration, with mild residual bibasilar atelectasis.  Echocardiogram showed an EF of 56-60%.  Prosthetic mitral valve present.  No pericardial effusion.  · 3/1/2023- hemoglobin 9.3, MCV 73.2, INR 3.03, bone marrow cytogenetics with normal female karyotype.    History of present illness reviewed since last visit and changes noted on 03/01/23.    Subjective    Some shortness of air on exertion.    ROS:  Review of Systems   Constitutional: Positive for fatigue. Negative for activity change, chills, fever and unexpected weight change.   HENT: Negative for congestion, dental problem, hearing loss, mouth sores, nosebleeds, sore throat and trouble swallowing.    Eyes: Negative for photophobia, discharge and visual disturbance.   Respiratory: Positive for shortness of breath. Negative for cough and chest tightness.    Cardiovascular: Negative for chest pain, palpitations and leg swelling.   Gastrointestinal: Negative for abdominal distention, abdominal pain, blood in stool, constipation, diarrhea, nausea and vomiting.   Endocrine: Negative for cold intolerance and heat intolerance.   Genitourinary: Negative for decreased urine volume, difficulty urinating, dysuria, enuresis, frequency, hematuria and urgency.   Musculoskeletal: Negative for arthralgias and gait problem.   Skin: Negative for rash and wound.   Neurological: Negative for dizziness,  "tremors, weakness, light-headedness, numbness and headaches.   Hematological: Negative for adenopathy. Does not bruise/bleed easily.   Psychiatric/Behavioral: Negative for agitation, confusion and hallucinations. The patient is not nervous/anxious.    All other systems reviewed and are negative.    MEDICATIONS:    Scheduled Meds:  aspirin, 81 mg, Oral, Daily  aspirin, 325 mg, Oral, Once  atorvastatin, 40 mg, Oral, Nightly  budesonide, 0.5 mg, Nebulization, BID - RT  bumetanide, 1 mg, Oral, BID  chlorhexidine, 15 mL, Mouth/Throat, Q12H  guaiFENesin, 1,200 mg, Oral, Q12H  insulin lispro, 2-7 Units, Subcutaneous, 4x Daily With Meals & Nightly  ipratropium-albuterol, 3 mL, Nebulization, 4x Daily - RT  lamoTRIgine, 200 mg, Oral, Daily  levothyroxine, 150 mcg, Oral, Q AM  metoprolol tartrate, 12.5 mg, Oral, Q12H  pantoprazole, 40 mg, Oral, Q AM  polyethylene glycol, 17 g, Oral, BID  potassium chloride, 20 mEq, Oral, BID  senna-docusate sodium, 2 tablet, Oral, BID  sucralfate, 1 g, Oral, 4x Daily AC & at Bedtime  traZODone, 50 mg, Oral, Nightly  venlafaxine XR, 150 mg, Oral, Daily With Breakfast    Continuous Infusions:   PRN Meds:  •  acetaminophen **OR** acetaminophen **OR** acetaminophen  •  ALPRAZolam  •  cyclobenzaprine  •  dextrose  •  dextrose  •  glucagon (human recombinant)  •  ipratropium-albuterol  •  [DISCONTINUED] Morphine **AND** naloxone  •  ondansetron  •  oxyCODONE **OR** oxyCODONE  •  potassium chloride **OR** potassium chloride     ALLERGIES:    Allergies   Allergen Reactions   • Hydrocodone-Acetaminophen Anxiety and Palpitations     Anxiety; Chest pain  Pt has tolerated oxycodone before   • Latex Shortness Of Breath and Itching   • Reglan [Metoclopramide] Other (See Comments)     Red face   • Aripiprazole Anxiety   • Sulfa Antibiotics Rash     Objective    VITALS:   /51   Pulse 74   Temp 97.5 °F (36.4 °C) (Oral)   Resp 18   Ht 165.1 cm (65\")   Wt 113 kg (250 lb 3.2 oz)   SpO2 93%   BMI " 41.64 kg/m²     PHYSICAL EXAM:  Physical Exam  Vitals and nursing note reviewed.   Constitutional:       General: She is not in acute distress.     Appearance: She is well-developed. She is obese. She is not toxic-appearing or diaphoretic.      Comments: Morbidly obese.   HENT:      Head: Normocephalic and atraumatic.      Right Ear: External ear normal.      Left Ear: External ear normal.      Nose: Nose normal.      Comments: Oxygen per nasal cannula.     Mouth/Throat:      Mouth: Mucous membranes are moist.      Pharynx: Oropharynx is clear. No oropharyngeal exudate or posterior oropharyngeal erythema.      Comments: Dental fillings.   Eyes:      General: No scleral icterus.     Extraocular Movements: Extraocular movements intact.      Conjunctiva/sclera: Conjunctivae normal.      Pupils: Pupils are equal, round, and reactive to light.   Cardiovascular:      Rate and Rhythm: Normal rate and regular rhythm.      Heart sounds: Normal heart sounds. No murmur heard.     Comments: Cardiac monitor leads.  Midline vertical chest wall scar healing and binder.  Pulmonary:      Effort: Pulmonary effort is normal. No respiratory distress.      Breath sounds: Normal breath sounds. No stridor. No wheezing or rales.   Abdominal:      General: Bowel sounds are normal. There is no distension.      Palpations: Abdomen is soft. There is no mass.      Tenderness: There is no abdominal tenderness. There is no guarding or rebound.   Genitourinary:     Comments: Deferred  Musculoskeletal:         General: No swelling, tenderness or deformity. Normal range of motion.      Cervical back: Normal range of motion. No rigidity.      Right lower leg: No edema.      Left lower leg: No edema.      Comments: Left upper extremity O2 monitor.     Lymphadenopathy:      Cervical: No cervical adenopathy.      Upper Body:      Right upper body: No supraclavicular adenopathy.      Left upper body: No supraclavicular adenopathy.   Skin:     General:  Skin is warm and dry.      Coloration: Skin is not jaundiced or pale.      Findings: No bruising, erythema or rash.      Comments: Right upper extremity IV x2.   Neurological:      General: No focal deficit present.      Mental Status: She is alert and oriented to person, place, and time.      Cranial Nerves: No cranial nerve deficit.      Coordination: Coordination normal.   Psychiatric:         Mood and Affect: Mood normal.         Behavior: Behavior normal.         Thought Content: Thought content normal.         Judgment: Judgment normal.     RECENT LABS:  Lab Results (last 24 hours)     Procedure Component Value Units Date/Time    POC Glucose Once [[id number]]  (Normal) Collected: 03/01/23 0750    Specimen: Blood Updated: 03/01/23 0754     Glucose 95 mg/dL      Comment: Serial Number: [id number]:  [id number]       Basic Metabolic Panel [[id number]]  (Abnormal) Collected: 03/01/23 0452    Specimen: Blood Updated: 03/01/23 0527     Glucose 103 mg/dL      BUN 15 mg/dL      Creatinine 1.02 mg/dL      Sodium 135 mmol/L      Potassium 4.3 mmol/L      Chloride 94 mmol/L      CO2 29.0 mmol/L      Calcium 9.2 mg/dL      BUN/Creatinine Ratio 14.7     Anion Gap 12.0 mmol/L      eGFR 67.6 mL/min/1.73     Narrative:      GFR Normal >60  Chronic Kidney Disease <60  Kidney Failure <15      Protime-INR [[id number]]  (Abnormal) Collected: 03/01/23 0452    Specimen: Blood Updated: 03/01/23 0512     Protime 29.3 Seconds      INR 3.03    CBC (No Diff) [[id number]]  (Abnormal) Collected: 03/01/23 0452    Specimen: Blood Updated: 03/01/23 0509     WBC 6.50 10*3/mm3      RBC 3.97 10*6/mm3      Hemoglobin 9.3 g/dL      Hematocrit 29.1 %      MCV 73.2 fL      MCH 23.4 pg      MCHC 31.9 g/dL      RDW 34.6 %      RDW-SD 83.1 fl      MPV 7.4 fL      Platelets 397 10*3/mm3     POC Glucose Once [[id number]]  (Abnormal) Collected: 02/28/23 2119    Specimen: Blood Updated: 02/28/23 2120     Glucose 124 mg/dL      Comment: Serial Number:  [id number]:  [id number]       [facility] ([facility]) [[id number]] Collected: 02/20/23 1409    Specimen: Bone Marrow from Sternum - Biopsy Updated: 02/28/23 1830     Cytogenetics Result Comment^Text^TXT     Comment: 46,XX[20]  Normal Female Karyotype  DISCLAIMER: REFER TO HARDCOPY OR PDF FOR COMPLETE RESULT.   If synopsis provided, clinical decisions should not be   based on this interfaced synopsis alone.  Performed at:  1 - [facility]  [address] [address]  :  Josy Bush M.D., Ph.D., Phone:  [phone]       Narrative:      Performed at:  1 - [facility]  [address], [place] [address]  :  Josy Bush M.D., Ph.D., Phone:  [phone]    POC Glucose Once [[id number]]  (Normal) Collected: 02/28/23 1647    Specimen: Blood Updated: 02/28/23 1648     Glucose 79 mg/dL      Comment: Serial Number: [id number]:  [id number]       POC Glucose Once [[id number]]  (Normal) Collected: 02/28/23 1153    Specimen: Blood Updated: 02/28/23 1155     Glucose 103 mg/dL      Comment: Serial Number: [id number]:  [id number]       Basic Metabolic Panel [[id number]]  (Abnormal) Collected: 02/28/23 0713    Specimen: Blood Updated: 02/28/23 0819     Glucose 114 mg/dL      BUN 11 mg/dL      Creatinine 0.85 mg/dL      Sodium 135 mmol/L      Potassium 3.6 mmol/L      Chloride 94 mmol/L      CO2 31.0 mmol/L      Calcium 9.5 mg/dL      BUN/Creatinine Ratio 12.9     Anion Gap 10.0 mmol/L      eGFR 84.1 mL/min/1.73     Narrative:      GFR Normal >60  Chronic Kidney Disease <60  Kidney Failure <15      Protime-INR [[id number]]  (Abnormal) Collected: 02/28/23 0713    Specimen: Blood Updated: 02/28/23 0803     Protime 33.3 Seconds      INR 3.47        PENDING RESULTS: PFTs.     IMAGING REVIEWED:  XR Chest 1 View    Result Date: 2/27/2023  Impression: 1. Improved appearance of pulmonary vascular  congestion 2. Improved aeration with mild residual bibasilar atelectasis Electronically Signed: Jeremiah Giron  2/27/2023 1:24 PM EST  Workstation ID: [id number]    I have reviewed the patient's labs, imaging, reports, and other clinician documentation.    Assessment & Plan   ASSESSMENT  1. Acute on chronic microcytic anemia/HELEN- work-up revealed HELEN. No hemolysis, vitamin B12 or folate deficiencies. Copper was elevated. Review of home medication list showed no copper containing vitamins or supplements.  SPEP showed no paraproteinemia. EGD and colonoscopy in September 2022 were negative for active bleeding but her stool was hemoccult positive. Has history of gastric sleeve.  Outpatient pill study recommended. Received Ferrlecit 250 mg IV daily x 4 and receiving currently. S/p sternal bone marrow aspiration and biopsy per CTS on 2/20/2023 during time of MVR and CABG -  flow cytometry negative, pathology was negative other than trace iron stores, and cytogenetics remain pending.  S/P 2 units pRBC on 2/18/2023 with goal hemoglobin 9-9.5 prior to surgery and 1 unit pRBC post surgery on 2/20/2023 for drop in hemoglobin to 6.5. Repeat iron studies showed continued HELEN. Will plan to check hemoglobin electrophoresis once patient is away from blood transfusions.  Improving.    2. Right middle lobe lung nodule- no mention of lung nodule on CT PE protocol at [facility] 1/27/2023; however, there was noted artifact from right-sided pacemaker.  She stopped smoking in 2021. Will follow with serial CT scans. She is at increased risk for infection and malignancy due to immunosuppressive treatment.  3. Congestive heart failure/Elevated D-dimer/Pulmonic valve stenosis/history of CABG: PE ruled out. Cardiology consulted. GATO on  2/14/2023 showed severe mitral regurgitation with normal EF. Cardiac catheterization showed severe single-vessel coronary artery disease and severe mitral regurgitation.  MVR (mechanical)/CABG  performed on 2/20/2023. On ASA and warfarin started per CTS 2/22.  Heparin drip discontinued 2/27 with INR therapeutic.      4. Asthma, IBS, Sjogren's syndrome, psoriatic arthrititis, hypothyroidism, mood disorder, history of migraines, and essential tremor: S/p brain stimulator implant and had been receiving immunosuppressive medications (Cimzia). Cimzia  currently on hold.  Management per Primary team.      PLAN  1. Follow hemoglobin.  2. Check hemoglobin electrophoresis as an outpatient.  3. Repeat CT chest as outpatient in 3 months and plan annual LD CT lung cancer screen.  4. Warfarin management per CTS.  5. Outpatient pill study recommended.                      I discussed the patient's findings and my recommendations with patient.    Part of this note may be an electronic transcription/translation of spoken language to printed text using the Dragon Dictation System.    Electronically signed by Alberto Hearn MD, 03/01/23, 8:00 AM EST.

## 2023-03-01 NOTE — PAYOR COMM NOTE
"This is clinical update for Diane Tiwari   Reference/Auth#: [id number]    EXTENDED AUTHORIZATION PENDING:     Please call or fax determination to contact below.   Thank you.    Zee Catalan RN, BSN  Utilization Review Nurse  [facility]  Direct & confidential phone # [phone]  Fax # [phone]    Diane Tiwari (47 y.o. Female)     Date of Birth   1975    Social Security Number       Address   [address] [place] [address]    Home Phone   [phone]    MRN   [id number]       [employer]   None    Marital Status   Single                            Admission Date   2/11/23    Admission Type   Emergency    Admitting Provider   Ani Sullivan MD    Attending Provider   Wenceslao Head MD    Department, Room/Bed   [facility] CARDIOVASCULAR [facility]ARE UNIT, 2211/1       Discharge Date       Discharge Disposition       Discharge Destination                               Attending Provider: Wenceslao Head MD    Allergies: Hydrocodone-acetaminophen, Latex, Reglan [Metoclopramide], Aripiprazole, Sulfa Antibiotics    Isolation: None   Infection: None   Code Status: CPR    Ht: 165.1 cm (65\")   Wt: 113 kg (250 lb 3.2 oz)    Admission Cmt: None   Principal Problem: Congestive heart failure, unspecified HF chronicity, unspecified heart failure type (HCC) [I50.9]                 Active Insurance as of 2/11/2023     Primary Coverage     Payor Plan Insurance Group Employer/Plan Group    MISC COMMERCIAL MISC COMMERCIAL [id number]     Coverage Address Coverage Phone Number Coverage Fax Number Effective Dates    [address] [phone]  1/16/2023 - None Entered    [id number]       Subscriber Name Subscriber Birth Date Member ID       DIANE TIWARI 1975 [id number]                 Emergency Contacts      (Rel.) Home Phone Work Phone Mobile Phone    Mirna Anderson (Mother) [phone] -- --    yousweetie (Sister) -- -- [phone]    "           Operative/Procedure Notes (last 48 hours)  Notes from 02/27/23 1439 through 03/01/23 1439   No notes of this type exist for this encounter.          Physician Progress Notes (last 48 hours)      Barrie Burns MD at 03/01/23 1235          CARDIOLOGY PROGRESS NOTE:    Altagracia Tiwari  47 y.o.  female  1975  [id number]      Referring Provider: Cardiac surgeon  Reason for follow-up: Shortness of breath and mitral regurgitation status post mechanical mitral valve placement, management of anticoagulation     Patient Care Team:  Marsha Parson DO as PCP - General (Family Medicine)  Ani Sullivan MD as PCP - Family Medicine  Alberto Hearn MD as Consulting Physician (Hematology and Oncology)    Subjective  No chest pain or shortness of breath    Objective  Sitting in chair without any symptoms on room air       Review of Systems   Constitutional: Negative for malaise/fatigue.   Cardiovascular: Negative for chest pain, dyspnea on exertion, leg swelling and palpitations.   Respiratory: Negative for cough and shortness of breath.    Gastrointestinal: Negative for abdominal pain, nausea and vomiting.   Neurological: Negative for dizziness, focal weakness, headaches, light-headedness and numbness.   All other systems reviewed and are negative.      Hydrocodone-acetaminophen, Latex, Reglan [metoclopramide], Aripiprazole, and Sulfa antibiotics    Scheduled Meds:aspirin, 81 mg, Oral, Daily  aspirin, 325 mg, Oral, Once  atorvastatin, 40 mg, Oral, Nightly  budesonide, 0.5 mg, Nebulization, BID - RT  bumetanide, 1 mg, Oral, BID  chlorhexidine, 15 mL, Mouth/Throat, Q12H  guaiFENesin, 1,200 mg, Oral, Q12H  insulin lispro, 2-7 Units, Subcutaneous, 4x Daily With Meals & Nightly  ipratropium-albuterol, 3 mL, Nebulization, 4x Daily - RT  lamoTRIgine, 200 mg, Oral, Daily  levothyroxine, 150 mcg, Oral, Q AM  metoprolol tartrate, 12.5 mg, Oral, Q12H  pantoprazole, 40 mg, Oral, Q AM  polyethylene glycol, 17 g, Oral,  "BID  potassium chloride, 20 mEq, Oral, BID  senna-docusate sodium, 2 tablet, Oral, BID  sucralfate, 1 g, Oral, 4x Daily AC & at Bedtime  traZODone, 50 mg, Oral, Nightly  venlafaxine XR, 150 mg, Oral, Daily With Breakfast      Continuous Infusions:   PRN Meds:.•  acetaminophen **OR** acetaminophen **OR** acetaminophen  •  ALPRAZolam  •  cyclobenzaprine  •  dextrose  •  dextrose  •  glucagon (human recombinant)  •  ipratropium-albuterol  •  [DISCONTINUED] Morphine **AND** naloxone  •  ondansetron  •  potassium chloride **OR** potassium chloride        VITAL SIGNS  Vitals:    03/01/23 0808 03/01/23 0811 03/01/23 0842 03/01/23 1200   BP:   97/62 118/56   BP Location:    Left arm   Patient Position:    Lying   Pulse: 74 74  83   Resp: 18 18  22   Temp:    97.9 °F (36.6 °C)   TempSrc:    Oral   SpO2: 94% 98%  95%   Weight:       Height:           Flowsheet Rows    Flowsheet Row First Filed Value   Admission Height 172.7 cm (68\") Documented at 02/11/2023 0859   Admission Weight 115 kg (253 lb 15.5 oz) Documented at 02/11/2023 0859           TELEMETRY: Sinus rhythm    Physical Exam:  Constitutional:       Appearance: Well-developed.   Eyes:      General: No scleral icterus.     Conjunctiva/sclera: Conjunctivae normal.   HENT:      Head: Normocephalic and atraumatic.   Neck:      Vascular: No carotid bruit or JVD.   Pulmonary:      Effort: Pulmonary effort is normal.      Breath sounds: Normal breath sounds. No wheezing. No rales.   Cardiovascular:      Normal rate. Regular rhythm.      click. Mechanical mitral valve present   Pulses:     Intact distal pulses.   Abdominal:      General: Bowel sounds are normal.      Palpations: Abdomen is soft.   Musculoskeletal:      Cervical back: Normal range of motion and neck supple. Skin:     General: Skin is warm and dry.      Findings: No rash.   Neurological:      Mental Status: Alert.          Results Review:   I reviewed the patient's new clinical results.  Lab Results (last 24 " hours)     Procedure Component Value Units Date/Time    POC Glucose Once [[id number]]  (Normal) Collected: 03/01/23 0750    Specimen: Blood Updated: 03/01/23 0754     Glucose 95 mg/dL      Comment: Serial Number: [id number]:  [id number]       Basic Metabolic Panel [[id number]]  (Abnormal) Collected: 03/01/23 0452    Specimen: Blood Updated: 03/01/23 0527     Glucose 103 mg/dL      BUN 15 mg/dL      Creatinine 1.02 mg/dL      Sodium 135 mmol/L      Potassium 4.3 mmol/L      Chloride 94 mmol/L      CO2 29.0 mmol/L      Calcium 9.2 mg/dL      BUN/Creatinine Ratio 14.7     Anion Gap 12.0 mmol/L      eGFR 67.6 mL/min/1.73     Narrative:      GFR Normal >60  Chronic Kidney Disease <60  Kidney Failure <15      Protime-INR [[id number]]  (Abnormal) Collected: 03/01/23 0452    Specimen: Blood Updated: 03/01/23 0512     Protime 29.3 Seconds      INR 3.03    CBC (No Diff) [[id number]]  (Abnormal) Collected: 03/01/23 0452    Specimen: Blood Updated: 03/01/23 0509     WBC 6.50 10*3/mm3      RBC 3.97 10*6/mm3      Hemoglobin 9.3 g/dL      Hematocrit 29.1 %      MCV 73.2 fL      MCH 23.4 pg      MCHC 31.9 g/dL      RDW 34.6 %      RDW-SD 83.1 fl      MPV 7.4 fL      Platelets 397 10*3/mm3     POC Glucose Once [[id number]]  (Abnormal) Collected: 02/28/23 2119    Specimen: Blood Updated: 02/28/23 2120     Glucose 124 mg/dL      Comment: Serial Number: [id number]:  [id number]       [facility] ([facility]) [[id number]] Collected: 02/20/23 1409    Specimen: Bone Marrow from Sternum - Biopsy Updated: 02/28/23 1830     [employer] Result Comment^Text^TXT     Comment: 46,XX[20]  Normal Female Karyotype  DISCLAIMER: REFER TO HARDCOPY OR PDF FOR COMPLETE RESULT.   If synopsis provided, clinical decisions should not be   based on this interfaced synopsis alone.  Performed at:   - [facility]  [address]  [address]  :  Josy Bush M.D., Ph.D., Phone:   [phone]       Narrative:      Performed at:  1 - [facility]  [address], [place]  [address]  :  Josy Bush M.D., Ph.D., Phone:  [phone]    POC Glucose Once [[id number]]  (Normal) Collected: 02/28/23 1647    Specimen: Blood Updated: 02/28/23 1648     Glucose 79 mg/dL      Comment: Serial Number: [id number]:  [id number]             Imaging Results (Last 24 Hours)     ** No results found for the last 24 hours. **          EKG          I personally viewed and interpreted the patient's EKG/Telemetry data:    ECHOCARDIOGRAM:  Results for orders placed during the hospital encounter of 02/11/23    Adult Transthoracic Echo Limited W/ Cont if Necessary Per Protocol    Interpretation Summary  •  Left ventricular ejection fraction appears to be 56 - 60%.  •  There is a prosthetic mitral valve present.  •  Technically difficult study with limited views  •  No pericardial effusion noted       STRESS MYOVIEW:       CARDIAC CATHETERIZATION:  No results found for this or any previous visit.       OTHER:         Assessment & Plan     Principal Problem:    Congestive heart failure, unspecified HF chronicity, unspecified heart failure type (HCC)  Active Problems:    Nonrheumatic mitral valve regurgitation    Coronary artery disease of native artery of native heart with stable angina pectoris (HCC)  Shortness of breath  Pulmonary valve stenosis  Anemia  Hypothyroidism    Altagracia Tiwari is a 47 year old woman who has been diagnosed with severe mitral regurgitation and 80% ostial LAD disease.  She is now status post LIMA to LAD, mechanical mitral valve replacement with 27 mm [place] prosthesis, PFO closure, left atrial appendage ligation and bone marrow biopsy from sternum on 2/20/2023.  His recent echocardiogram shows LV function is preserved and EF is 65%.  She is now on aspirin, beta-blocker and high intensity statin.  Patient is on warfarin and INR is  therapeutic therefore, heparin has been discontinued  Hematology would like to transfuse if hemoglobin drops below 7, H&H today 8. point  Iron replacement is ongoing  She is on Bumex, renal function is normal with creatinine of 0.89 and eGFR of 79.6 today  Encourage ambulation and incentive spirometry  Patient is actually being discharged to home with home health  P patient is on warfarin and INR levels are around 3 and will keep it around 3 for her valve  Patient will be followed in the office       Barrie Burns MD  23  12:36 EST                Electronically signed by Barrie Burns MD at 23 1236     Alberto Hearn MD at 23 0755          Hematology/Oncology Inpatient Progress Note    PATIENT NAME: Altagracia Tiwari  : 1975  MRN: [id number]    CHIEF COMPLAINT: Acute on chronic HELEN, copper elevation, and right middle lobe lung nodule    HISTORY OF PRESENT ILLNESS:    47 y.o. female admitted to [facility] through the ED on 2023 with right lower chest pain with radiation to her back that had started on 2/10/2023 along with some nausea.  The pain was worse (10/10) when eating, drinking, or taking a deep breath.  She reported a similar episode two weeks prior to admission however pain at that time was in the center of her chest with some shortness of air.  She was evaluated at a hospital in [place], [place] with negative work-up.  At time of consultation she reported still living in [place], [place] but planning to move to [facility] in May 2023.  In the ED on 2023, CT PE protocol was negative for PE and revealed probable pulmonary hypertension, bilateral lower lobe atelectasis, trace right pleural effusion, and a 6 mm right middle lobe lung nodule.  CBC revealed WBC 5.4, hemoglobin 7.7, MCV 60.2, and platelets 480,000.  Creatinine was normal at 0.9 and LFTs were not elevated.  Coags were okay.  D-dimer was mildly elevated at 1.02 (0-0.5).  Iron was 12  (), iron saturation 3% (20-50), TIBC 463 (298-536), and ferritin was 13.1 (). Reticulocytes were 1.53% (0.7-1.9) and haptoglobin 185 ().  At time of consultation, hemoglobin was 7.4 with MCV 59.7.  PMH was significant for anemia dating back to 2019 where hemoglobin was 11.6.  She was initially noted to be microcytic in 2021.  Hemoglobin had been in the eights since August 2022 where on 8/8/2022 hemoglobin was 8.8 and MCV 67.7. The patient reported hemoglobin ranging 8.2-8.8 over 1-year prior to admission. EGD and colonoscopy were performed 9/23/2022 by Dr. Jeremiah Wells at [facility]. EGD revealed a small hiatal hernia, chronic gastritis, normal appearance of gastric sleeve, and normal duodenum.  Pathology on duodenal biopsy was negative.  Stomach biopsy revealed chronic inactive gastritis and reactive foveolar metaplasia, negative for H. pylori.  Colonoscopy revealed polyps in the transverse colon and sigmoid colon with diverticulosis in the sigmoid colon and non bleeding hemorrhoids.  All polyp pathologies were tubular adenomas. The patient denied prior pill enteroscopy. She stated she had stopped menstruating at age 40.     02/12/23  Hematology/Oncology was consulted by Ani Sullivan MD.     Past Medical History: Sjogren's diagnosed 2018, psoriatic arthritis diagnosed approximately 2016, hypothyroidism diagnosed approximately 2003, fibrocystic breast disease, anxiety/depression diagnosed early 2000's, irritable bowel syndrome for many years, tremors diagnosed early 2000s.  Surgical History: Brain stimulator with battery in left chest wall April 2022 secondary to tremors, gastric sleeve 2018, right ankle surgery, facial injury requiring plastic surgery 1992, laparoscopic cholecystectomy 2004, TRK of the eyes in 2005.  EGD and colonoscopy 9/23/2022.  Social History: She currently lives in [place], [place] with plans to relocate to [facility] in May 2023.  Her mother and her  sister live in [facility].  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  She stopped smoking in 2021.  She drinks alcohol socially.  Family History: Her mother had colon cancer in her 60s.  Her father had precancerous cells on colonoscopy, and maternal great aunt had leukemia type unknown.  Allergies: Hydrocodone causes anxiety, aripiprazole (Abilify) causes anxiety, Reglan causes her to have a red face, latex causes shortness of air, and sulfa causes rash.     PCP: Marsha Parson,     INTERVAL HISTORY:  · 2/12/2023 -  hemoglobin 7.4, MCV 59.7.  Initiated on Ferrlecit 250 mg IV daily x4.  · 2/14/2023 -  hemoglobin 7.8, MCV 60.8. Fecal occult blood positive. 2D echocardiogram showed left ventricular systolic function was normal. Calculated left ventricular EF = 66%. Left ventricular ejection fraction appears to be 66 - 70%.  Left ventricular diastolic function was consistent with (grade I) impaired relaxation. Severe mitral valve regurgitation was present. Moderate to severe mitral valve stenosis was present.  · 2/15/2023 - Hemoglobin 8.0. SPEP demonstrated elevation of regions containing acute phase proteins suggestive of an acute/subacute inflammatory response.  M spike not observed. GATO on 2/14/2023 showed Left ventricular ejection fraction appears to be 61 - 65%.  Saline test results were negative. Severe mitral valve regurgitation was present. No pericardial effusion noted.   · 2/16/2023 - hemoglobin 7.2, copper 194 (). Cardiac catheterization on 2/15/2023 showed severe single-vessel coronary artery disease and severe mitral regurgitation.  Surgical consultation recommended.  · 2/17/2023 - Hemoglobin 7.2.  · 2/18/2023-hemoglobin 7.2, MCV 63.2.  Bilateral carotid ultrasound with normal flow.  Transfused with 2 units pRBC.  · 2/19/2023- WBC 4.4 and hemoglobin 8.8.  · 2/20/2023- Hemoglobin 9.8.  · 2/21/2023 -  Hemoglobin 6.5, MCV 71.3 post surgery and transfused 1 unit pRBC on  2/20/2023. MVR (mechanical)/CABG and sternal bone marrow aspiration and biopsy per CTS  performed on  2/20/2023. Hemoglobin 9.4, MCV 71.5.   · 2/22/2023 - Hemoglobin 8.8, MCV 71.1. Extubated on 2/21/2023. Bone marrow flow cytometry was negative. Warfarin started per CTS.    · 2/23/2023 - WBC 11.1, hemoglobin 9.2, MCV 71.7. Bone marrow pathology revealed benign normocellular bone marrow (50%) with no significant histopathology, trace iron stores, and negative for malignant lymphoma. Mitral valve, excision showed benign valvular tissue with hyalinized fibrosis, myxoid degeneration, and single dystrophic calcification. Chest tubes were removed. Heparin drip started per CTS.  · 2/24/2023 - WBC 10.5, hemoglobin 9.0, MCV 72, iron 16 (), iron saturation 6 (20-50) transferrin 183 (200-360), TIBC 273 (298-536), ferritin 400.10 (13..00).  Calculated iron deficit using IBW was 729 mg. Started Ferrlecit 250 mg IV x 3 days.   · 2/25/2023- INR 1.68, hemoglobin 8.2, MCV 72.  · 2/26/2023- hemoglobin 8.1.  INR 2.26 with warfarin on hold since 2/23.  · 2/27/2023- hemoglobin 8.4.  INR 2.53.  Heparin drip was discontinued.  · 2/28/2023- hemoglobin 8.8, MCV 72.8.  INR 3.47.  Chest x-ray showed improved appearance of pulmonary vascular congestion and aeration, with mild residual bibasilar atelectasis.  Echocardiogram showed an EF of 56-60%.  Prosthetic mitral valve present.  No pericardial effusion.  · 3/1/2023- hemoglobin 9.3, MCV 73.2, INR 3.03, bone marrow cytogenetics with normal female karyotype.    History of present illness reviewed since last visit and changes noted on 03/01/23.    Subjective    Some shortness of air on exertion.    ROS:  Review of Systems   Constitutional: Positive for fatigue. Negative for activity change, chills, fever and unexpected weight change.   HENT: Negative for congestion, dental problem, hearing loss, mouth sores, nosebleeds, sore throat and trouble swallowing.    Eyes: Negative for  photophobia, discharge and visual disturbance.   Respiratory: Positive for shortness of breath. Negative for cough and chest tightness.    Cardiovascular: Negative for chest pain, palpitations and leg swelling.   Gastrointestinal: Negative for abdominal distention, abdominal pain, blood in stool, constipation, diarrhea, nausea and vomiting.   Endocrine: Negative for cold intolerance and heat intolerance.   Genitourinary: Negative for decreased urine volume, difficulty urinating, dysuria, enuresis, frequency, hematuria and urgency.   Musculoskeletal: Negative for arthralgias and gait problem.   Skin: Negative for rash and wound.   Neurological: Negative for dizziness, tremors, weakness, light-headedness, numbness and headaches.   Hematological: Negative for adenopathy. Does not bruise/bleed easily.   Psychiatric/Behavioral: Negative for agitation, confusion and hallucinations. The patient is not nervous/anxious.    All other systems reviewed and are negative.    MEDICATIONS:    Scheduled Meds:  aspirin, 81 mg, Oral, Daily  aspirin, 325 mg, Oral, Once  atorvastatin, 40 mg, Oral, Nightly  budesonide, 0.5 mg, Nebulization, BID - RT  bumetanide, 1 mg, Oral, BID  chlorhexidine, 15 mL, Mouth/Throat, Q12H  guaiFENesin, 1,200 mg, Oral, Q12H  insulin lispro, 2-7 Units, Subcutaneous, 4x Daily With Meals & Nightly  ipratropium-albuterol, 3 mL, Nebulization, 4x Daily - RT  lamoTRIgine, 200 mg, Oral, Daily  levothyroxine, 150 mcg, Oral, Q AM  metoprolol tartrate, 12.5 mg, Oral, Q12H  pantoprazole, 40 mg, Oral, Q AM  polyethylene glycol, 17 g, Oral, BID  potassium chloride, 20 mEq, Oral, BID  senna-docusate sodium, 2 tablet, Oral, BID  sucralfate, 1 g, Oral, 4x Daily AC & at Bedtime  traZODone, 50 mg, Oral, Nightly  venlafaxine XR, 150 mg, Oral, Daily With Breakfast    Continuous Infusions:   PRN Meds:  •  acetaminophen **OR** acetaminophen **OR** acetaminophen  •  ALPRAZolam  •  cyclobenzaprine  •  dextrose  •  dextrose  •   "glucagon (human recombinant)  •  ipratropium-albuterol  •  [DISCONTINUED] Morphine **AND** naloxone  •  ondansetron  •  oxyCODONE **OR** oxyCODONE  •  potassium chloride **OR** potassium chloride     ALLERGIES:    Allergies   Allergen Reactions   • Hydrocodone-Acetaminophen Anxiety and Palpitations     Anxiety; Chest pain  Pt has tolerated oxycodone before   • Latex Shortness Of Breath and Itching   • Reglan [Metoclopramide] Other (See Comments)     Red face   • Aripiprazole Anxiety   • Sulfa Antibiotics Rash     Objective    VITALS:   /51   Pulse 74   Temp 97.5 °F (36.4 °C) (Oral)   Resp 18   Ht 165.1 cm (65\")   Wt 113 kg (250 lb 3.2 oz)   SpO2 93%   BMI 41.64 kg/m²     PHYSICAL EXAM:  Physical Exam  Vitals and nursing note reviewed.   Constitutional:       General: She is not in acute distress.     Appearance: She is well-developed. She is obese. She is not toxic-appearing or diaphoretic.      Comments: Morbidly obese.   HENT:      Head: Normocephalic and atraumatic.      Right Ear: External ear normal.      Left Ear: External ear normal.      Nose: Nose normal.      Comments: Oxygen per nasal cannula.     Mouth/Throat:      Mouth: Mucous membranes are moist.      Pharynx: Oropharynx is clear. No oropharyngeal exudate or posterior oropharyngeal erythema.      Comments: Dental fillings.   Eyes:      General: No scleral icterus.     Extraocular Movements: Extraocular movements intact.      Conjunctiva/sclera: Conjunctivae normal.      Pupils: Pupils are equal, round, and reactive to light.   Cardiovascular:      Rate and Rhythm: Normal rate and regular rhythm.      Heart sounds: Normal heart sounds. No murmur heard.     Comments: Cardiac monitor leads.  Midline vertical chest wall scar healing and binder.  Pulmonary:      Effort: Pulmonary effort is normal. No respiratory distress.      Breath sounds: Normal breath sounds. No stridor. No wheezing or rales.   Abdominal:      General: Bowel sounds are " normal. There is no distension.      Palpations: Abdomen is soft. There is no mass.      Tenderness: There is no abdominal tenderness. There is no guarding or rebound.   Genitourinary:     Comments: Deferred  Musculoskeletal:         General: No swelling, tenderness or deformity. Normal range of motion.      Cervical back: Normal range of motion. No rigidity.      Right lower leg: No edema.      Left lower leg: No edema.      Comments: Left upper extremity O2 monitor.     Lymphadenopathy:      Cervical: No cervical adenopathy.      Upper Body:      Right upper body: No supraclavicular adenopathy.      Left upper body: No supraclavicular adenopathy.   Skin:     General: Skin is warm and dry.      Coloration: Skin is not jaundiced or pale.      Findings: No bruising, erythema or rash.      Comments: Right upper extremity IV x2.   Neurological:      General: No focal deficit present.      Mental Status: She is alert and oriented to person, place, and time.      Cranial Nerves: No cranial nerve deficit.      Coordination: Coordination normal.   Psychiatric:         Mood and Affect: Mood normal.         Behavior: Behavior normal.         Thought Content: Thought content normal.         Judgment: Judgment normal.     RECENT LABS:  Lab Results (last 24 hours)     Procedure Component Value Units Date/Time    POC Glucose Once [[id number]]  (Normal) Collected: 03/01/23 0750    Specimen: Blood Updated: 03/01/23 0754     Glucose 95 mg/dL      Comment: Serial Number: [id number]:  [id number]       Basic Metabolic Panel [[id number]]  (Abnormal) Collected: 03/01/23 0452    Specimen: Blood Updated: 03/01/23 0527     Glucose 103 mg/dL      BUN 15 mg/dL      Creatinine 1.02 mg/dL      Sodium 135 mmol/L      Potassium 4.3 mmol/L      Chloride 94 mmol/L      CO2 29.0 mmol/L      Calcium 9.2 mg/dL      BUN/Creatinine Ratio 14.7     Anion Gap 12.0 mmol/L      eGFR 67.6 mL/min/1.73     Narrative:      GFR Normal >60  Chronic Kidney  Disease <60  Kidney Failure <15      Protime-INR [[id number]]  (Abnormal) Collected: 03/01/23 0452    Specimen: Blood Updated: 03/01/23 0512     Protime 29.3 Seconds      INR 3.03    CBC (No Diff) [[id number]]  (Abnormal) Collected: 03/01/23 0452    Specimen: Blood Updated: 03/01/23 0509     WBC 6.50 10*3/mm3      RBC 3.97 10*6/mm3      Hemoglobin 9.3 g/dL      Hematocrit 29.1 %      MCV 73.2 fL      MCH 23.4 pg      MCHC 31.9 g/dL      RDW 34.6 %      RDW-SD 83.1 fl      MPV 7.4 fL      Platelets 397 10*3/mm3     POC Glucose Once [[id number]]  (Abnormal) Collected: 02/28/23 2119    Specimen: Blood Updated: 02/28/23 2120     Glucose 124 mg/dL      Comment: Serial Number: [id number]:  [id number]       Cytogenetics ([facility]) [[id number]] Collected: 02/20/23 1409    Specimen: Bone Marrow from Sternum - Biopsy Updated: 02/28/23 1830     Cytogenetics Result Comment^Text^TXT     Comment: 46,XX[20]  Normal Female Karyotype  DISCLAIMER: REFER TO HARDCOPY OR PDF FOR COMPLETE RESULT.   If synopsis provided, clinical decisions should not be   based on this interfaced synopsis alone.  Performed at:  1 - [facility]  [address] [place] [address]  :  Josy Bush M.D., Ph.D., Phone:  [phone]       Narrative:      Performed at:  1 - [facility]  [address] [address]  :  Josy Bsuh M.D., Ph.D., Phone:  [phone]    POC Glucose Once [[id number]]  (Normal) Collected: 02/28/23 1647    Specimen: Blood Updated: 02/28/23 1648     Glucose 79 mg/dL      Comment: Serial Number: [id number]:  [id number]       POC Glucose Once [[id number]]  (Normal) Collected: 02/28/23 1153    Specimen: Blood Updated: 02/28/23 1155     Glucose 103 mg/dL      Comment: Serial Number: [id number]:  [id number]       Basic Metabolic Panel [[id number]]  (Abnormal) Collected: 02/28/23 0713    Specimen: Blood  Updated: 02/28/23 0819     Glucose 114 mg/dL      BUN 11 mg/dL      Creatinine 0.85 mg/dL      Sodium 135 mmol/L      Potassium 3.6 mmol/L      Chloride 94 mmol/L      CO2 31.0 mmol/L      Calcium 9.5 mg/dL      BUN/Creatinine Ratio 12.9     Anion Gap 10.0 mmol/L      eGFR 84.1 mL/min/1.73     Narrative:      GFR Normal >60  Chronic Kidney Disease <60  Kidney Failure <15      Protime-INR [[id number]]  (Abnormal) Collected: 02/28/23 0713    Specimen: Blood Updated: 02/28/23 0803     Protime 33.3 Seconds      INR 3.47        PENDING RESULTS: PFTs.     IMAGING REVIEWED:  XR Chest 1 View    Result Date: 2/27/2023  Impression: 1. Improved appearance of pulmonary vascular congestion 2. Improved aeration with mild residual bibasilar atelectasis Electronically Signed: Jeremiah Giron  2/27/2023 1:24 PM EST  Workstation ID: [id number]    I have reviewed the patient's labs, imaging, reports, and other clinician documentation.    Assessment & Plan   ASSESSMENT  1. Acute on chronic microcytic anemia/HELEN- work-up revealed HELEN. No hemolysis, vitamin B12 or folate deficiencies. Copper was elevated. Review of home medication list showed no copper containing vitamins or supplements.  SPEP showed no paraproteinemia. EGD and colonoscopy in September 2022 were negative for active bleeding but her stool was hemoccult positive. Has history of gastric sleeve.  Outpatient pill study recommended. Received Ferrlecit 250 mg IV daily x 4 and receiving currently. S/p sternal bone marrow aspiration and biopsy per CTS on 2/20/2023 during time of MVR and CABG -  flow cytometry negative, pathology was negative other than trace iron stores, and cytogenetics remain pending.  S/P 2 units pRBC on 2/18/2023 with goal hemoglobin 9-9.5 prior to surgery and 1 unit pRBC post surgery on 2/20/2023 for drop in hemoglobin to 6.5. Repeat iron studies showed continued HELEN. Will plan to check hemoglobin electrophoresis once patient is away from blood transfusions.   Improving.    2. Right middle lobe lung nodule- no mention of lung nodule on CT PE protocol at [facility] 1/27/2023; however, there was noted artifact from right-sided pacemaker.  She stopped smoking in 2021. Will follow with serial CT scans. She is at increased risk for infection and malignancy due to immunosuppressive treatment.  3. Congestive heart failure/Elevated D-dimer/Pulmonic valve stenosis/history of CABG: PE ruled out. Cardiology consulted. GATO on  2/14/2023 showed severe mitral regurgitation with normal EF. Cardiac catheterization showed severe single-vessel coronary artery disease and severe mitral regurgitation.  MVR (mechanical)/CABG performed on 2/20/2023. On ASA and warfarin started per CTS 2/22.  Heparin drip discontinued 2/27 with INR therapeutic.      4. Asthma, IBS, Sjogren's syndrome, psoriatic arthrititis, hypothyroidism, mood disorder, history of migraines, and essential tremor: S/p brain stimulator implant and had been receiving immunosuppressive medications (Cimzia). Cimzia  currently on hold.  Management per Primary team.      PLAN  1. Follow hemoglobin.  2. Check hemoglobin electrophoresis as an outpatient.  3. Repeat CT chest as outpatient in 3 months and plan annual LD CT lung cancer screen.  4. Warfarin management per CTS.  5. Outpatient pill study recommended.                     I discussed the patient's findings and my recommendations with patient.    Part of this note may be an electronic transcription/translation of spoken language to printed text using the Dragon Dictation System.    Electronically signed by Alberto Hearn MD, 03/01/23, 8:00 AM EST.        Electronically signed by Alberto Hearn MD at 03/01/23 0800     Barrie Burns MD at 02/28/23 1205          CARDIOLOGY PROGRESS NOTE:    Altagracia Tiwari  47 y.o.  female  1975  [id number]      Referring Provider: Cardiac surgeon  Reason for follow-up: Shortness of breath and mitral regurgitation status  post mechanical mitral valve placement, management of anticoagulation     Patient Care Team:  Marsha Parson DO as PCP - General (Family Medicine)  Ani Sullivan MD as PCP - Family Medicine  Alberto Hearn MD as Consulting Physician (Hematology and Oncology)    Subjective  Patient seen and examined.  Labs and chart reviewed.  Patient states she is doing better today and was able to have her first bowel movement postsurgery.  Patient has been up walking with therapy and is currently out of bed to chair.   Patient currently denies chest discomfort, shortness of breath, headaches, nausea, vomiting, lightheadedness, dizziness, syncope.      Objective  Sitting in chair without any symptoms on room air       Review of Systems   Constitutional: Negative for malaise/fatigue.   Cardiovascular: Negative for chest pain, dyspnea on exertion, leg swelling and palpitations.   Respiratory: Negative for cough and shortness of breath.    Gastrointestinal: Negative for abdominal pain, nausea and vomiting.   Neurological: Negative for dizziness, focal weakness, headaches, light-headedness and numbness.   All other systems reviewed and are negative.      Hydrocodone-acetaminophen, Latex, Reglan [metoclopramide], Aripiprazole, and Sulfa antibiotics    Scheduled Meds:aspirin, 81 mg, Oral, Daily  aspirin, 325 mg, Oral, Once  atorvastatin, 40 mg, Oral, Nightly  budesonide, 0.5 mg, Nebulization, BID - RT  bumetanide, 1 mg, Oral, BID  chlorhexidine, 15 mL, Mouth/Throat, Q12H  guaiFENesin, 1,200 mg, Oral, Q12H  insulin lispro, 2-7 Units, Subcutaneous, 4x Daily With Meals & Nightly  ipratropium-albuterol, 3 mL, Nebulization, 4x Daily - RT  lamoTRIgine, 200 mg, Oral, Daily  levothyroxine, 150 mcg, Oral, Q AM  metoprolol tartrate, 12.5 mg, Oral, Q12H  pantoprazole, 40 mg, Oral, Q AM  polyethylene glycol, 17 g, Oral, BID  potassium chloride, 20 mEq, Oral, BID  senna-docusate sodium, 2 tablet, Oral, BID  sucralfate, 1 g, Oral, 4x Daily  "AC & at Bedtime  traZODone, 50 mg, Oral, Nightly  venlafaxine XR, 150 mg, Oral, Daily With Breakfast  warfarin, 2 mg, Oral, Once      Continuous Infusions:   PRN Meds:.•  acetaminophen **OR** acetaminophen **OR** acetaminophen  •  ALPRAZolam  •  cyclobenzaprine  •  dextrose  •  dextrose  •  glucagon (human recombinant)  •  ipratropium-albuterol  •  [DISCONTINUED] Morphine **AND** naloxone  •  ondansetron  •  oxyCODONE **OR** oxyCODONE  •  potassium chloride **OR** potassium chloride        VITAL SIGNS  Vitals:    02/28/23 0724 02/28/23 1054 02/28/23 1057 02/28/23 1144   BP: 101/57      BP Location: Left arm      Patient Position: Sitting      Pulse: 84 85 82    Resp: 14 16 16    Temp: 98 °F (36.7 °C)   97.7 °F (36.5 °C)   TempSrc: Oral   Oral   SpO2: 92% 99% 100%    Weight:       Height:           Flowsheet Rows    Flowsheet Row First Filed Value   Admission Height 172.7 cm (68\") Documented at 02/11/2023 0859   Admission Weight 115 kg (253 lb 15.5 oz) Documented at 02/11/2023 0859           TELEMETRY: Sinus rhythm    Physical Exam:  Constitutional:       Appearance: Well-developed.   Eyes:      General: No scleral icterus.     Conjunctiva/sclera: Conjunctivae normal.   HENT:      Head: Normocephalic and atraumatic.   Neck:      Vascular: No carotid bruit or JVD.   Pulmonary:      Effort: Pulmonary effort is normal.      Breath sounds: Normal breath sounds. No wheezing. No rales.   Cardiovascular:      Normal rate. Regular rhythm.      click. Mechanical mitral valve present   Pulses:     Intact distal pulses.   Abdominal:      General: Bowel sounds are normal.      Palpations: Abdomen is soft.   Musculoskeletal:      Cervical back: Normal range of motion and neck supple. Skin:     General: Skin is warm and dry.      Findings: No rash.   Neurological:      Mental Status: Alert.          Results Review:   I reviewed the patient's new clinical results.  Lab Results (last 24 hours)     Procedure Component Value Units " Date/Time    POC Glucose Once [[id number]]  (Normal) Collected: 02/28/23 1153    Specimen: Blood Updated: 02/28/23 1155     Glucose 103 mg/dL      Comment: Serial Number: [id number]:  [id number]       Basic Metabolic Panel [[id number]]  (Abnormal) Collected: 02/28/23 0713    Specimen: Blood Updated: 02/28/23 0819     Glucose 114 mg/dL      BUN 11 mg/dL      Creatinine 0.85 mg/dL      Sodium 135 mmol/L      Potassium 3.6 mmol/L      Chloride 94 mmol/L      CO2 31.0 mmol/L      Calcium 9.5 mg/dL      BUN/Creatinine Ratio 12.9     Anion Gap 10.0 mmol/L      eGFR 84.1 mL/min/1.73     Narrative:      GFR Normal >60  Chronic Kidney Disease <60  Kidney Failure <15      Protime-INR [[id number]]  (Abnormal) Collected: 02/28/23 0713    Specimen: Blood Updated: 02/28/23 0803     Protime 33.3 Seconds      INR 3.47    CBC (No Diff) [[id number]]  (Abnormal) Collected: 02/28/23 0713    Specimen: Blood Updated: 02/28/23 0755     WBC 5.90 10*3/mm3      RBC 3.95 10*6/mm3      Hemoglobin 8.8 g/dL      Hematocrit 28.8 %      MCV 72.8 fL      MCH 22.2 pg      MCHC 30.4 g/dL      RDW 34.6 %      RDW-SD 77.9 fl      MPV 8.6 fL      Platelets 411 10*3/mm3     POC Glucose Once [[id number]]  (Normal) Collected: 02/28/23 0745    Specimen: Blood Updated: 02/28/23 0749     Glucose 100 mg/dL      Comment: Serial Number: [id number]:  [id number]       POC Glucose Once [[id number]]  (Normal) Collected: 02/28/23 0552    Specimen: Blood Updated: 02/28/23 0553     Glucose 105 mg/dL      Comment: Serial Number: [id number]:  [id number]       POC Glucose Once [[id number]]  (Abnormal) Collected: 02/27/23 1912    Specimen: Blood Updated: 02/27/23 1914     Glucose 117 mg/dL      Comment: Serial Number: [id number]:  [id number]       POC Glucose Once [[id number]]  (Normal) Collected: 02/27/23 1704    Specimen: Blood Updated: 02/27/23 1708     Glucose 93 mg/dL      Comment: Serial Number: [id number]:  [id number]             Imaging  Results (Last 24 Hours)     Procedure Component Value Units Date/Time    XR Chest 1 View [[id number]] Collected: 02/27/23 1322     Updated: 02/27/23 1327    Narrative:      XR CHEST 1 VW    Date of Exam: 2/27/2023 12:28 PM EST    Indication: SOA.    Comparison: 2/22/2023    Findings:  Status post cardiac valve repair. Cardiomegaly. Decreased prominence of the central pulmonary vasculature and increased distinctness of the peripheral pulmonary vasculature. Improved aeration on today's study with improved bibasilar atelectasis, with   some residual on the left. No large pleural effusion. No pneumothorax      Impression:      Impression:    1. Improved appearance of pulmonary vascular congestion  2. Improved aeration with mild residual bibasilar atelectasis    Electronically Signed: Jeremiah Giron    2/27/2023 1:24 PM EST    Workstation ID: [id number]          EKG          I personally viewed and interpreted the patient's EKG/Telemetry data:    ECHOCARDIOGRAM:  Results for orders placed during the hospital encounter of 02/11/23    Adult Transthoracic Echo Limited W/ Cont if Necessary Per Protocol    Interpretation Summary  •  Left ventricular ejection fraction appears to be 56 - 60%.  •  There is a prosthetic mitral valve present.  •  Technically difficult study with limited views  •  No pericardial effusion noted       STRESS MYOVIEW:       CARDIAC CATHETERIZATION:  No results found for this or any previous visit.       OTHER:         Assessment & Plan     Principal Problem:    Congestive heart failure, unspecified HF chronicity, unspecified heart failure type (HCC)  Active Problems:    Nonrheumatic mitral valve regurgitation    Coronary artery disease of native artery of native heart with stable angina pectoris (HCC)  Shortness of breath  Pulmonary valve stenosis  Anemia  Hypothyroidism    Altagracia Tiwari is a 47 year old woman who has been diagnosed with severe mitral regurgitation and 80% ostial LAD disease.  She is now  status post LIMA to LAD, mechanical mitral valve replacement with 27 mm [place] prosthesis, PFO closure, left atrial appendage ligation and bone marrow biopsy from sternum on 2023.  His recent echocardiogram shows LV function is preserved and EF is 65%.  She is now on aspirin, beta-blocker and high intensity statin.  Patient is on warfarin and INR is therapeutic therefore, heparin has been discontinued  Hematology would like to transfuse if hemoglobin drops below 7, H&H today 8.4/26 point  Iron replacement is ongoing  She is on Bumex, renal function is normal with creatinine of 0.89 and eGFR of 79.6 today  Encourage ambulation and incentive spirometry  Patient is actually being discharged to home with home health  Patient is currently stable from cardiac status and her INR is 3.47 will adjust Coumadin dosing as an outpatient       Barrie Burns MD  23  12:05 EST                Electronically signed by Barrie Burns MD at 23 1206     Alberto Hearn MD at 23 0828          Hematology/Oncology Inpatient Progress Note    PATIENT NAME: Altagracia Tiwari  : 1975  MRN: [id number]    CHIEF COMPLAINT: Acute on chronic HELEN, copper elevation, and right middle lobe lung nodule    HISTORY OF PRESENT ILLNESS:    47 y.o. female admitted to [facility] through the ED on 2023 with right lower chest pain with radiation to her back that had started on 2/10/2023 along with some nausea.  The pain was worse (10/10) when eating, drinking, or taking a deep breath.  She reported a similar episode two weeks prior to admission however pain at that time was in the center of her chest with some shortness of air.  She was evaluated at a hospital in [place], [place] with negative work-up.  At time of consultation she reported still living in [place], [place] but planning to move to [facility] in May 2023.  In the ED on 2023, CT PE protocol was negative for PE and revealed probable  pulmonary hypertension, bilateral lower lobe atelectasis, trace right pleural effusion, and a 6 mm right middle lobe lung nodule.  CBC revealed WBC 5.4, hemoglobin 7.7, MCV 60.2, and platelets 480,000.  Creatinine was normal at 0.9 and LFTs were not elevated.  Coags were okay.  D-dimer was mildly elevated at 1.02 (0-0.5).  Iron was 12 (), iron saturation 3% (20-50), TIBC 463 (298-536), and ferritin was 13.1 (). Reticulocytes were 1.53% (0.7-1.9) and haptoglobin 185 ().  At time of consultation, hemoglobin was 7.4 with MCV 59.7.  PMH was significant for anemia dating back to 2019 where hemoglobin was 11.6.  She was initially noted to be microcytic in 2021.  Hemoglobin had been in the eights since August 2022 where on 8/8/2022 hemoglobin was 8.8 and MCV 67.7. The patient reported hemoglobin ranging 8.2-8.8 over 1-year prior to admission. EGD and colonoscopy were performed 9/23/2022 by Dr. Jeremiah Wells at [facility]. EGD revealed a small hiatal hernia, chronic gastritis, normal appearance of gastric sleeve, and normal duodenum.  Pathology on duodenal biopsy was negative.  Stomach biopsy revealed chronic inactive gastritis and reactive foveolar metaplasia, negative for H. pylori.  Colonoscopy revealed polyps in the transverse colon and sigmoid colon with diverticulosis in the sigmoid colon and non bleeding hemorrhoids.  All polyp pathologies were tubular adenomas. The patient denied prior pill enteroscopy. She stated she had stopped menstruating at age 40.     02/12/23  Hematology/Oncology was consulted by Ani Sullivan MD.     Past Medical History: Sjogren's diagnosed 2018, psoriatic arthritis diagnosed approximately 2016, hypothyroidism diagnosed approximately 2003, fibrocystic breast disease, anxiety/depression diagnosed early 2000's, irritable bowel syndrome for many years, tremors diagnosed early 2000s.  Surgical History: Brain stimulator with battery in left chest wall April 2022  secondary to tremors, gastric sleeve 2018, right ankle surgery, facial injury requiring plastic surgery 1992, laparoscopic cholecystectomy 2004, TRK of the eyes in 2005.  EGD and colonoscopy 9/23/2022.  Social History: She currently lives in [place], [place] with plans to relocate to [facility] in May 2023.  Her mother and her sister live in [facility].  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  She stopped smoking in 2021.  She drinks alcohol socially.  Family History: Her mother had colon cancer in her 60s.  Her father had precancerous cells on colonoscopy, and maternal great aunt had leukemia type unknown.  Allergies: Hydrocodone causes anxiety, aripiprazole (Abilify) causes anxiety, Reglan causes her to have a red face, latex causes shortness of air, and sulfa causes rash.     PCP: Marsha Parson,     INTERVAL HISTORY:  · 2/12/2023 -  hemoglobin 7.4, MCV 59.7.  Initiated on Ferrlecit 250 mg IV daily x4.  · 2/14/2023 -  hemoglobin 7.8, MCV 60.8. Fecal occult blood positive. 2D echocardiogram showed left ventricular systolic function was normal. Calculated left ventricular EF = 66%. Left ventricular ejection fraction appears to be 66 - 70%.  Left ventricular diastolic function was consistent with (grade I) impaired relaxation. Severe mitral valve regurgitation was present. Moderate to severe mitral valve stenosis was present.  · 2/15/2023 - Hemoglobin 8.0. SPEP demonstrated elevation of regions containing acute phase proteins suggestive of an acute/subacute inflammatory response.  M spike not observed. GATO on 2/14/2023 showed Left ventricular ejection fraction appears to be 61 - 65%.  Saline test results were negative. Severe mitral valve regurgitation was present. No pericardial effusion noted.   · 2/16/2023 - hemoglobin 7.2, copper 194 (). Cardiac catheterization on 2/15/2023 showed severe single-vessel coronary artery disease and severe mitral regurgitation.   Surgical consultation recommended.  · 2/17/2023 - Hemoglobin 7.2.  · 2/18/2023-hemoglobin 7.2, MCV 63.2.  Bilateral carotid ultrasound with normal flow.  Transfused with 2 units pRBC.  · 2/19/2023- WBC 4.4 and hemoglobin 8.8.  · 2/20/2023- Hemoglobin 9.8.  · 2/21/2023 -  Hemoglobin 6.5, MCV 71.3 post surgery and transfused 1 unit pRBC on 2/20/2023. MVR (mechanical)/CABG and sternal bone marrow aspiration and biopsy per CTS  performed on  2/20/2023. Hemoglobin 9.4, MCV 71.5.   · 2/22/2023 - Hemoglobin 8.8, MCV 71.1. Extubated on 2/21/2023. Bone marrow flow cytometry was negative. Warfarin started per CTS.    · 2/23/2023 - WBC 11.1, hemoglobin 9.2, MCV 71.7. Bone marrow pathology revealed benign normocellular bone marrow (50%) with no significant histopathology, trace iron stores, and negative for malignant lymphoma. Mitral valve, excision showed benign valvular tissue with hyalinized fibrosis, myxoid degeneration, and single dystrophic calcification. Chest tubes were removed. Heparin drip started per CTS.  · 2/24/2023 - WBC 10.5, hemoglobin 9.0, MCV 72, iron 16 (), iron saturation 6 (20-50) transferrin 183 (200-360), TIBC 273 (298-536), ferritin 400.10 (13..00).  Calculated iron deficit using IBW was 729 mg. Started Ferrlecit 250 mg IV x 3 days.   · 2/25/2023- INR 1.68, hemoglobin 8.2, MCV 72.  · 2/26/2023- hemoglobin 8.1.  INR 2.26 with warfarin on hold since 2/23.  · 2/27/2023- hemoglobin 8.4.  INR 2.53.  Heparin drip was discontinued.  · 2/28/2023- hemoglobin 8.8, MCV 72.8.  INR 3.47.  Chest x-ray showed improved appearance of pulmonary vascular congestion and aeration, with mild residual bibasilar atelectasis.  Echocardiogram showed an EF of 56-60%.  Prosthetic mitral valve present.  No pericardial effusion.    History of present illness reviewed since last visit and changes noted on 02/28/23.    Subjective    Reports she is tired.    ROS:  Review of Systems   Constitutional: Positive for fatigue.  Negative for activity change, chills, fever and unexpected weight change.   HENT: Negative for congestion, dental problem, hearing loss, mouth sores, nosebleeds, sore throat and trouble swallowing.    Eyes: Negative for photophobia, discharge and visual disturbance.   Respiratory: Negative for cough, chest tightness and shortness of breath.    Cardiovascular: Negative for chest pain, palpitations and leg swelling.   Gastrointestinal: Negative for abdominal distention, abdominal pain, blood in stool, constipation, diarrhea, nausea and vomiting.   Endocrine: Negative for cold intolerance and heat intolerance.   Genitourinary: Negative for decreased urine volume, difficulty urinating, dysuria, enuresis, frequency, hematuria and urgency.   Musculoskeletal: Negative for arthralgias and gait problem.   Skin: Negative for rash and wound.   Neurological: Negative for dizziness, tremors, weakness, light-headedness, numbness and headaches.   Hematological: Negative for adenopathy. Does not bruise/bleed easily.   Psychiatric/Behavioral: Negative for agitation, confusion and hallucinations. The patient is not nervous/anxious.    All other systems reviewed and are negative.    MEDICATIONS:    Scheduled Meds:  aspirin, 81 mg, Oral, Daily  aspirin, 325 mg, Oral, Once  atorvastatin, 40 mg, Oral, Nightly  budesonide, 0.5 mg, Nebulization, BID - RT  bumetanide, 1 mg, Oral, BID  chlorhexidine, 15 mL, Mouth/Throat, Q12H  guaiFENesin, 1,200 mg, Oral, Q12H  insulin lispro, 2-7 Units, Subcutaneous, 4x Daily With Meals & Nightly  ipratropium-albuterol, 3 mL, Nebulization, 4x Daily - RT  lamoTRIgine, 200 mg, Oral, Daily  levothyroxine, 150 mcg, Oral, Q AM  metoprolol tartrate, 12.5 mg, Oral, Q12H  pantoprazole, 40 mg, Oral, Q AM  polyethylene glycol, 17 g, Oral, BID  potassium chloride, 20 mEq, Oral, BID  senna-docusate sodium, 2 tablet, Oral, BID  sucralfate, 1 g, Oral, 4x Daily AC & at Bedtime  traZODone, 50 mg, Oral, Nightly  venlafaxine  "XR, 150 mg, Oral, Daily With Breakfast    Continuous Infusions:   PRN Meds:  •  acetaminophen **OR** acetaminophen **OR** acetaminophen  •  ALPRAZolam  •  cyclobenzaprine  •  dextrose  •  dextrose  •  glucagon (human recombinant)  •  ipratropium-albuterol  •  [DISCONTINUED] Morphine **AND** naloxone  •  ondansetron  •  oxyCODONE **OR** oxyCODONE  •  potassium chloride **OR** potassium chloride     ALLERGIES:    Allergies   Allergen Reactions   • Hydrocodone-Acetaminophen Anxiety and Palpitations     Anxiety; Chest pain  Pt has tolerated oxycodone before   • Latex Shortness Of Breath and Itching   • Reglan [Metoclopramide] Other (See Comments)     Red face   • Aripiprazole Anxiety   • Sulfa Antibiotics Rash     Objective    VITALS:   /57 (BP Location: Left arm, Patient Position: Sitting)   Pulse 84   Temp 98 °F (36.7 °C) (Oral)   Resp 14   Ht 165.1 cm (65\")   Wt 115 kg (252 lb 10.4 oz)   SpO2 92%   BMI 42.04 kg/m²     PHYSICAL EXAM:  Physical Exam  Vitals and nursing note reviewed.   Constitutional:       General: She is not in acute distress.     Appearance: Normal appearance. She is well-developed. She is not toxic-appearing or diaphoretic.      Comments: Morbidly obese.   HENT:      Head: Normocephalic and atraumatic.      Right Ear: External ear normal.      Left Ear: External ear normal.      Nose: Nose normal.      Comments: Oxygen per nasal cannula.     Mouth/Throat:      Mouth: Mucous membranes are moist.      Pharynx: Oropharynx is clear. No oropharyngeal exudate or posterior oropharyngeal erythema.      Comments: Dental fillings.   Eyes:      General: No scleral icterus.     Extraocular Movements: Extraocular movements intact.      Conjunctiva/sclera: Conjunctivae normal.      Pupils: Pupils are equal, round, and reactive to light.   Cardiovascular:      Rate and Rhythm: Normal rate and regular rhythm.      Heart sounds: Normal heart sounds. No murmur heard.     Comments: Cardiac monitor leads. "  Midline vertical chest wall scar healing and binder.  Pulmonary:      Effort: Pulmonary effort is normal. No respiratory distress.      Breath sounds: Normal breath sounds. No stridor. No wheezing or rales.   Abdominal:      General: Bowel sounds are normal. There is no distension.      Palpations: Abdomen is soft. There is no mass.      Tenderness: There is no abdominal tenderness. There is no guarding or rebound.   Genitourinary:     Comments: Deferred  Musculoskeletal:         General: No swelling, tenderness or deformity. Normal range of motion.      Cervical back: Normal range of motion. No rigidity.      Right lower leg: No edema.      Left lower leg: No edema.      Comments: Left upper extremity O2 monitor.     Lymphadenopathy:      Cervical: No cervical adenopathy.      Upper Body:      Right upper body: No supraclavicular adenopathy.      Left upper body: No supraclavicular adenopathy.   Skin:     General: Skin is warm and dry.      Coloration: Skin is not jaundiced or pale.      Findings: No bruising, erythema or rash.      Comments: Right upper extremity IV x2.   Neurological:      General: No focal deficit present.      Mental Status: She is alert and oriented to person, place, and time.      Cranial Nerves: No cranial nerve deficit.      Coordination: Coordination normal.   Psychiatric:         Mood and Affect: Mood normal.         Behavior: Behavior normal.         Thought Content: Thought content normal.         Judgment: Judgment normal.     RECENT LABS:  Lab Results (last 24 hours)     Procedure Component Value Units Date/Time    Basic Metabolic Panel [[id number]]  (Abnormal) Collected: 02/28/23 0713    Specimen: Blood Updated: 02/28/23 0819     Glucose 114 mg/dL      BUN 11 mg/dL      Creatinine 0.85 mg/dL      Sodium 135 mmol/L      Potassium 3.6 mmol/L      Chloride 94 mmol/L      CO2 31.0 mmol/L      Calcium 9.5 mg/dL      BUN/Creatinine Ratio 12.9     Anion Gap 10.0 mmol/L      eGFR 84.1  mL/min/1.73     Narrative:      GFR Normal >60  Chronic Kidney Disease <60  Kidney Failure <15      Protime-INR [[id number]]  (Abnormal) Collected: 02/28/23 0713    Specimen: Blood Updated: 02/28/23 0803     Protime 33.3 Seconds      INR 3.47    CBC (No Diff) [[id number]]  (Abnormal) Collected: 02/28/23 0713    Specimen: Blood Updated: 02/28/23 0755     WBC 5.90 10*3/mm3      RBC 3.95 10*6/mm3      Hemoglobin 8.8 g/dL      Hematocrit 28.8 %      MCV 72.8 fL      MCH 22.2 pg      MCHC 30.4 g/dL      RDW 34.6 %      RDW-SD 77.9 fl      MPV 8.6 fL      Platelets 411 10*3/mm3     POC Glucose Once [[id number]]  (Normal) Collected: 02/28/23 0745    Specimen: Blood Updated: 02/28/23 0749     Glucose 100 mg/dL      Comment: Serial Number: [id number]:  [id number]       POC Glucose Once [[id number]]  (Normal) Collected: 02/28/23 0552    Specimen: Blood Updated: 02/28/23 0553     Glucose 105 mg/dL      Comment: Serial Number: [id number]:  [id number]       POC Glucose Once [[id number]]  (Abnormal) Collected: 02/27/23 1912    Specimen: Blood Updated: 02/27/23 1914     Glucose 117 mg/dL      Comment: Serial Number: [id number]:  [id number]       POC Glucose Once [[id number]]  (Normal) Collected: 02/27/23 1704    Specimen: Blood Updated: 02/27/23 1708     Glucose 93 mg/dL      Comment: Serial Number: [id number]:  [id number]       POC Glucose Once [[id number]]  (Abnormal) Collected: 02/27/23 1128    Specimen: Blood Updated: 02/27/23 1132     Glucose 128 mg/dL      Comment: Serial Number: [id number]:  [id number]       POC Activated Clotting Time [[id number]]  (Normal) Collected: 02/20/23 1620    Specimen: Arterial Blood Updated: 02/27/23 1030     Activated Clotting Time  125 Seconds      Comment: Serial Number: [id number]:  [id number]       POC Activated Clotting Time [[id number]]  (Abnormal) Collected: 02/20/23 1551    Specimen: Arterial Blood Updated: 02/27/23 1030     Activated Clotting Time  407 Seconds       Comment: Serial Number: [id number]:  [id number]       POC Activated Clotting Time [[id number]]  (Abnormal) Collected: 02/20/23 1518    Specimen: Arterial Blood Updated: 02/27/23 1030     Activated Clotting Time  474 Seconds      Comment: Serial Number: [id number]:  [id number]       POC Activated Clotting Time [[id number]]  (Abnormal) Collected: 02/20/23 1449    Specimen: Venous Blood Updated: 02/27/23 1030     Activated Clotting Time  426 Seconds      Comment: Serial Number: [id number]:  [id number]       POC Activated Clotting Time [[id number]]  (Abnormal) Collected: 02/20/23 1346    Specimen: Arterial Blood Updated: 02/27/23 1028     Activated Clotting Time  149 Seconds      Comment: Serial Number: [id number]:  [id number]           PENDING RESULTS: PFTs. Bone marrow cytogenetics.    IMAGING REVIEWED:  XR Chest 1 View    Result Date: 2/27/2023  Impression: 1. Improved appearance of pulmonary vascular congestion 2. Improved aeration with mild residual bibasilar atelectasis Electronically Signed: Jeremiah Giron  2/27/2023 1:24 PM EST  Workstation ID: [id number]    I have reviewed the patient's labs, imaging, reports, and other clinician documentation.    Assessment & Plan   ASSESSMENT  5. Acute on chronic microcytic anemia/HELEN- work-up revealed HELEN. No hemolysis, vitamin B12 or folate deficiencies. Copper was elevated. Review of home medication list showed no copper containing vitamins or supplements.  SPEP showed no paraproteinemia. EGD and colonoscopy in September 2022 were negative for active bleeding but her stool was hemoccult positive. Has history of gastric sleeve.  Outpatient pill study recommended. Received Ferrlecit 250 mg IV daily x 4 and receiving currently. S/p sternal bone marrow aspiration and biopsy per CTS on 2/20/2023 during time of MVR and CABG -  flow cytometry negative, pathology was negative other than trace iron stores, and cytogenetics remain pending.  S/P 2 units pRBC on 2/18/2023 with goal  hemoglobin 9-9.5 prior to surgery and 1 unit pRBC post surgery on 2/20/2023 for drop in hemoglobin to 6.5. Repeat iron studies showed continued HELEN. Will plan to check hemoglobin electrophoresis once patient is away from blood transfusions.  Improving.    6. Right middle lobe lung nodule- no mention of lung nodule on CT PE protocol at [facility] 1/27/2023; however, there was noted artifact from right-sided pacemaker.  She stopped smoking in 2021. Will follow with serial CT scans. She is at increased risk for infection and malignancy due to immunosuppressive treatment.  7. Congestive heart failure/Elevated D-dimer/Pulmonic valve stenosis/history of CABG: PE ruled out. Cardiology consulted. GATO on  2/14/2023 showed severe mitral regurgitation with normal EF. Cardiac catheterization showed severe single-vessel coronary artery disease and severe mitral regurgitation.  MVR (mechanical)/CABG performed on 2/20/2023. On ASA and warfarin started per CTS 2/22.  Heparin drip discontinued 2/27 with INR therapeutic.      8. Asthma, IBS, Sjogren's syndrome, psoriatic arthrititis, hypothyroidism, mood disorder, history of migraines, and essential tremor: S/p brain stimulator implant and had been receiving immunosuppressive medications (Cimzia). Cimzia  currently on hold.  Management per Primary team.      PLAN  6. Follow hemoglobin.  7. Check hemoglobin electrophoresis as an outpatient.  8. Await bone marrow cytogenetis.  9. Repeat CT chest as outpatient in 3 months and plan annual LD CT lung cancer screen.  10. Warfarin management per CTS.  11. Outpatient pill study recommended.     Patient seen and evaluated by Dr. Hearn.  Electronically signed by ISSAC Gutierrez, 02/28/23, 9:05 AM EST.            I have personally performed a face-to-face diagnostic evaluation on this patient. I have performed a complete history and physical examination, reviewed laboratory studies, and radiographic examinations.  I have  completed the majority and substantive portion of the medical decision making.  I have formulated the assessment on this patient and the plan of action as noted above. I have discussed the case with Heidi Lopez NP, have edited/reviewed the note, and agree with the care plan.  The patient is complaining of feeling tired.  On examination, he has a healing midline vertical scar on the chest and a chest binder is present.  Hemoglobin is 8.8 with MCV 72.8.  She is getting ready for discharge.  We will arrange for her to have a hemoglobin electrophoresis and pill enteroscopy study performed in the near future.        I discussed the patient's findings and my recommendations with patient.    Part of this note may be an electronic transcription/translation of spoken language to printed text using the Dragon Dictation System.    Electronically signed by Alberto Hearn MD, 02/28/23, 5:24 PM EST.        Electronically signed by Alberto Hearn MD at 02/28/23 1724     Wenceslao Head MD at 02/28/23 0804          S/P POD# 8 urgent MECHANICAL MVR/ CABG x2 with LIMA/ PFO closure/ YOHAN ligation/ bone marrow bx from sternum--Adilene  EF 60-65% (GATO)    Subjective: much better today emotionally    Nocturnal oximetry passed overnight  DBS restarted yest, tremors greatly improved  Care mmgt having issue finding home health to follow up  Echo without pericardial effusion  CXR yest improved aeration  INR 3.47 today  Wt is up 1 kg from preop  Bone marrow cytology 2/20--No significant immunophenotypic abnormalities of myeloid and lymphoid cells   Bone marrow pathology 2/20--normal, low iron stores, no lymphoma      Intake/Output Summary (Last 24 hours) at 2/28/2023 0804  Last data filed at 2/28/2023 0600  Gross per 24 hour   Intake 1250 ml   Output 1575 ml   Net -325 ml     Temp:  [98 °F (36.7 °C)-99.2 °F (37.3 °C)] 98 °F (36.7 °C)  Heart Rate:  [80-98] 84  Resp:  [14-25] 14  BP: ()/(55-81) 101/57      Results from last 7  days   Lab Units 02/28/23  0713 02/27/23  0503 02/26/23  2106   WBC 10*3/mm3 5.90 6.50  --    HEMOGLOBIN g/dL 8.8* 8.4*  --    HEMATOCRIT % 28.8* 26.5*  --    PLATELETS 10*3/mm3 411 379  --    INR  3.47* 2.53 2.59     Results from last 7 days   Lab Units 02/27/23  0503   CREATININE mg/dL 0.89   POTASSIUM mmol/L 3.7   SODIUM mmol/L 135*       Physical Exam:  Neuro intact, nad, working with therapy  Tele:  SR 90s, mechanical heart tone  Diminished bases, 92% RA  Sternotomy/ SVHS healing well  Benign abd, + BM  No edema    Assessment/Plan:  Principal Problem:    Congestive heart failure, unspecified HF chronicity, unspecified heart failure type (HCC)  Active Problems:    Nonrheumatic mitral valve regurgitation    Coronary artery disease of native artery of native heart with stable angina pectoris (HCC)    - Severe mitral valve regurgitation/ mild to moderate tricuspid regurgitation, EF 60-65% (GATO)--surgical workup in progress  - CAD--LAD disease  - HFpEF, unknown chronicity  - Acute on chronic anemia/ HELEN--EGD/colonoscopy negative, heme consulted on 2/12 and recommend outpatient pill enteroscopy, transfused preop 2/17, 2/18, 2/19  - Sjogren's syndrome, psoriatic arthritis (followed by Dr. Estrada)--Cizmia use  - Obesity, stage 2--BMI 38.62, hx of gastric sleeve  - Hypothyroidism--levothyroxine  - Asthma  - Hx of essential tremor, s/p deep brain stimulator (3/2022), followed by Dr. Quezada  - Hypertriglyceridemia  - GERD  - Depression/anxiety--cont home meds  - IBS  - RML lung nodule found of CT of chest (2/11/23)--hematology recommend repeat CT as outpatient in 3 months  - Postop ABLA, expected--transfused 2/20  - Postop leukocytosis, likely atel--aggressive pulm toileting, Mucomyst nebs      POD# 8.  INR 3.47.  Continues to report mild SOA with ambulation.  Dr. Head d/w pt about her SOA with walking around.  He feels she is having an inflammatory response and expects it to improve.  Replete e-.  Mobilize. Coumadin  today for mechanical MVR--1 mg.  Cont oral diuretics. Home meds--lamotrigine, levothyroxine, Effexor.   Restart home Xanax and Trazodone  On asa/statin/bb.  Care mmgt to setup outpt PT.   are working to help get outpt services arranged for pt.    Routine care--as above  D/w pt/family, Dr. Samuels, care mmgt  Anticipate rehab vs home at discharge    Tahmina Patel, ISSAC  2/28/2023  08:04 EST    Electronically signed by Wenceslao Head MD at 02/28/23 2047       Consult Notes (last 48 hours)  Notes from 02/27/23 1439 through 03/01/23 1439   No notes of this type exist for this encounter.

## 2023-03-01 NOTE — CASE MANAGEMENT/SOCIAL WORK
"Social Work Assessment  [facility]     Patient Name: Altagracia Tiwari  MRN: [id number]  Today's Date: 3/1/2023    Admit Date: 2/11/2023     Discharge Needs Assessment     Row Name 03/01/23 1150       Discharge Needs Assessment    Concerns to be Addressed care coordination/care conferences    Concerns Comments NAHUN and RNYANY met with pt at bedside to discuss rehab options. SW informed her of outpatient therapy benefit with [facility] in [place] with a $60 co-pay per visit, until she enters cardiac rehab, likely 2-4 weeks from date of d/c. SW inquired of whether this is affordable for her, to which pt replied, \"$360? Yeah.\" Pt inquired of the logistics of getting it set up, to which RNYANY informed her instructions would be on the d/c summary. Pt thanked NAHUN and RNYANY and denies further needs at this time.              Met with patient in room wearing PPE: mask.      Maintained distance greater than six feet and spent less than 15 minutes in the room.      LINDA Lee, W  Medical Social Worker  Ph [phone]  Fax [phone]  [email or web address]    "

## 2023-03-01 NOTE — DISCHARGE SUMMARY
Date of Admission:  2/11/2023  Date of Discharge:  3/1/2023    Discharge Diagnosis:    - Severe mitral valve regurgitation now s/p MVR (mechanical)  - Mild to moderate tricuspid regurgitation  - Normal LV systolic function---EF 60-65% (GATO)  - CAD now s/p CABG x 1   - HFpEF, unknown chronicity  - Acute on chronic anemia/ HELEN---outpatient pill enteroscopy recommended  - Sjogren's syndrome, psoriatic arthritis   - Obesity, stage 2--BMI 38.62, hx of gastric sleeve  - Hypothyroidism  - Asthma  - Hx of essential tremor, s/p deep brain stimulator (3/2022), followed by Dr. Quezada  - Hypertriglyceridemia  - GERD  - Depression/anxiety  - IBS  - RML lung nodule found of CT of chest (2/11/23)--hematology recommend repeat CT as outpatient in 3 months  - Postop ABLA, expected  - Postop leukocytosis    Presenting Problem/History of Present Illness:  - Severe mitral valve regurgitation   - Mild to moderate tricuspid regurgitation  - Normal LV systolic function---EF 60-65% (GATO)  - CAD   - HFpEF, unknown chronicity  - Acute on chronic anemia/ HELEN---outpatient pill enteroscopy recommended  - Sjogren's syndrome, psoriatic arthritis   - Obesity, stage 2--BMI 38.62, hx of gastric sleeve  - Hypothyroidism  - Asthma  - Hx of essential tremor, s/p deep brain stimulator (3/2022), followed by Dr. Quezada  - Hypertriglyceridemia  - GERD  - Depression/anxiety  - IBS  - RML lung nodule found of CT of chest (2/11/23)--hematology recommend repeat CT as outpatient in 3 months    Hospital Course:   Patient is a 47 y.o. female who presented to the ED on 2/11/23 with a chief complaint of chest pain and shortness of breath. She was found to have a murmur and be anemic with a Hgb of 7.7, so she was admitted for further evaluation and treatment. A CT of the chest revealed a right middle lobe lung nodule and repeat imaging is recommended in 3 months. A transthoracic echocardiogram was performed on 2/12/23 demonstrating severe mitral valve  regurgitation with moderate to severe mitral valve stenosis. Cardiac catheterization was performed on 2/15/23 revealing severe LAD CAD. Cardiac Surgery was subsequently consulted and recommended mitral valve replacement and CABG. Prior to surgery patient was transfused PRBC on 2/17/23, 2/18/23, and 2/19/23. An outpatient pill enteroscopy has also been recommended as an outpatient. On 2/20/23 she was taken to the Operating Room and under general anesthesia and via median sternotomy underwent CABG x 1, mitral valve replacement with a 27 mm [place]. Madan mechanical prosthesis, PFO closure, and open bone marrow biopsy by Dr. Head. Final pathology on bone marrow biopsy revealed no significant immunophenotypic abnormalities of myeloid and lymphoid cells. Patient tolerated the procedure well and was transported to the Cardiovascular Care Unit in stable condition. The next morning patient was successfully weaned from the ventilator and extubated at approximately 1100. Initially post-operatively patient required low dose Epinephrine, low dose Edward synephrine, and Milrinone, but was weaned off aThroughout her post-operative course she was followed by Cardiac Surgery, Cardiology, Hematology, Physical Therapy, and Occupational Therapy. On post-operative day #2 she was started on Coumadin for her mechanical mitral valve. On post-operative day #3 a Heparin drip was also started. On post-operative day #7 her deep brain stimulator was restarted. The rest of her hospital course was uneventful and one of gradual improvement. On post-operative day #9, on 3/1/23, she was deemed stable for discharge home with outpatient physical therapy. She was discharged on aspirin, statin, beta blocker, and Coumadin for her mechanical mitral valve. Goal INR 2.5 - 3.5. INR on 3/1/23 was 3.03. Dr. Burns will be managing her Coumadin therapy.                 Procedures Performed:  Procedure(s) 2/20/2023 Dr. Head:   1.  Urgent CABG x1 with a LIMA to the mid  LAD (CPT code 22717)   2.  Mitral valve replacement with a 27 mm St. Madan mechanical  prosthesis (CPT code 81312)   3.  PFO closure (CPT code 84582)   4.  Open bone marrow biopsy       Consults:   Consults     Date and Time Order Name Status Description    2/20/2023  5:32 PM Inpatient Cardiology Consult      2/11/2023  2:47 PM Hematology & Oncology Inpatient Consult Completed     2/11/2023  2:23 PM Inpatient Cardiology Consult Completed           Pertinent Test Results:    Lab Results   Component Value Date    WBC 6.50 03/01/2023    HGB 9.3 (L) 03/01/2023    HCT 29.1 (L) 03/01/2023    MCV 73.2 (L) 03/01/2023     03/01/2023      Lab Results   Component Value Date    GLUCOSE 103 (H) 03/01/2023    CALCIUM 9.2 03/01/2023     (L) 03/01/2023    K 4.3 03/01/2023    CO2 29.0 03/01/2023    CL 94 (L) 03/01/2023    BUN 15 03/01/2023    CREATININE 1.02 (H) 03/01/2023    EGFRIFAFRI >59 08/08/2022    EGFRIFNONA 59 (A) 08/08/2022    BCR 14.7 03/01/2023    ANIONGAP 12.0 03/01/2023     Lab Results   Component Value Date    INR 3.03 (H) 03/01/2023    PROTIME 29.3 (H) 03/01/2023       Condition on Discharge: Stable     Vital Signs  Temp:  [97.5 °F (36.4 °C)-99.3 °F (37.4 °C)] 97.9 °F (36.6 °C)  Heart Rate:  [74-97] 83  Resp:  [18-22] 22  BP: ()/(51-71) 118/56  Body mass index is 41.64 kg/m².    Discharge Disposition  Home-Health Care Svc    Discharge Medications     Discharge Medications      New Medications      Instructions Start Date   aspirin 81 MG EC tablet   81 mg, Oral, Daily   Start Date: March 2, 2023     atorvastatin 40 MG tablet  Commonly known as: LIPITOR   40 mg, Oral, Nightly      bumetanide 1 MG tablet  Commonly known as: BUMEX   Take 1 tablet by mouth 2 (Two) Times a Day for 7 days, THEN 1 tablet Daily for 7 days.   Start Date: March 1, 2023     cyclobenzaprine 10 MG tablet  Commonly known as: FLEXERIL   10 mg, Oral, 3 Times Daily PRN      metoprolol tartrate 25 MG tablet  Commonly known as:  LOPRESSOR   12.5 mg, Oral, Every 12 Hours Scheduled      potassium chloride 10 MEQ CR capsule  Commonly known as: MICRO-K   Take 2 capsules by mouth 2 (Two) Times a Day for 7 days, THEN 2 capsules Daily for 21 days.   Start Date: March 1, 2023     sucralfate 1 g tablet  Commonly known as: CARAFATE   1 g, Oral, 4 Times Daily Before Meals & Nightly      warfarin 4 MG tablet  Commonly known as: Coumadin   Take 4 mg daily or as directed by Dr. Burns.  For mechanical heart valve.         Continue These Medications      Instructions Start Date   acetaminophen 500 MG tablet  Commonly known as: TYLENOL   500 mg, Oral, Every 6 Hours PRN      Advair Diskus 250-50 MCG/ACT DISKUS  Generic drug: Fluticasone-Salmeterol   INHALE 1 PUFF BY MOUTH TWICE DAILY      ALPRAZolam 0.25 MG tablet  Commonly known as: XANAX   0.25 mg, Oral, 2 Times Daily      esomeprazole 40 MG capsule  Commonly known as: nexIUM   40 mg, Oral, Every Morning Before Breakfast      lamoTRIgine 200 MG tablet  Commonly known as: LaMICtal   200 mg, Oral, Daily      levothyroxine 150 MCG tablet  Commonly known as: SYNTHROID, LEVOTHROID   150 mcg, Oral, Daily      nystatin 029489 UNIT/GM powder  Commonly known as: MYCOSTATIN   Topical, 2 Times Daily PRN      traZODone 100 MG tablet  Commonly known as: DESYREL   100 mg, Oral, Nightly      venlafaxine  MG 24 hr capsule  Commonly known as: EFFEXOR-XR   150 mg, Oral, Daily      Ventolin  (90 Base) MCG/ACT inhaler  Generic drug: albuterol sulfate HFA   INHALE 1 TO 2 PUFFS BY MOUTH EVERY 6 HOURS AS NEEDED      albuterol 0.63 MG/3ML nebulizer solution  Commonly known as: ACCUNEB   0.63 mg, Nebulization, Every 6 Hours PRN         Stop These Medications    Cimzia 2 X 200 MG kit  Generic drug: Certolizumab Pegol            Discharge Diet: Healthy heart    Activity at Discharge:    · No driving x 2 weeks or while taking narcotic pain medication  · No lifting greater than 10 pounds x 6 weeks  · Ambulate at least 10  minutes 3 times a day    Follow-up Appointments  Future Appointments   Date Time Provider Department Center   3/22/2023  9:00 AM Anai Ceja APRN [facility]   3/23/2023 12:45 PM Tahmina Patel APRN [facility]     Additional Instructions for the Follow-ups that You Need to Schedule     Ambulatory Referral to Physical Therapy Evaluate and treat, POST OP; Strengthening   As directed      Add Scheduling Instructions here    Specialty needed: Evaluate and treat POST OP    Exercises: Strengthening    Follow-up needed: Yes         Call MD With Problems / Concerns   As directed      Instructions:  Call office at [phone] for any drainage, increased redness, or fever over 100.5    Order Comments: Instructions:  Call office at [phone] for any drainage, increased redness, or fever over 100.5          Discharge Follow-up with PCP   As directed       Currently Documented PCP:    Marsha Parson DO    PCP Phone Number:    [phone]     Follow Up Details: in 1 week         Discharge Follow-up with Specialty: Cardiac surgery APRN/PA; 2 Weeks   As directed      Specialty: Cardiac surgery APRN/PA    Follow Up: 2 Weeks    Follow Up Details: 3/23/2023 at 12:45 pm         Discharge Follow-up with Specified Provider: Cardiologist; 3 Weeks   As directed      To: Cardiologist    Follow Up: 3 Weeks    Follow Up Details: NP follow up 3/22/2023 at 9 am         Discharge Follow-up with Specified Provider:    As directed      To:     Follow Up Details: 4-6 weeks, bring all current medications to appointment             **This Discharge Summary was written for completion of medical records. I was not personally involved in this patient's discharge from the hospital.        TOPHER [email or web address]  03/01/23  16:55 EST

## 2023-03-01 NOTE — PROGRESS NOTES
CARDIOLOGY PROGRESS NOTE:    Altagracia Tiwari  47 y.o.  female  1975  [id number]      Referring Provider: Cardiac surgeon  Reason for follow-up: Shortness of breath and mitral regurgitation status post mechanical mitral valve placement, management of anticoagulation     Patient Care Team:  Marsha Parson DO as PCP - General (Family Medicine)  Ani Sullivan MD as PCP - Family Medicine  Alberto Hearn MD as Consulting Physician (Hematology and Oncology)    Subjective  No chest pain or shortness of breath    Objective  Sitting in chair without any symptoms on room air       Review of Systems   Constitutional: Negative for malaise/fatigue.   Cardiovascular: Negative for chest pain, dyspnea on exertion, leg swelling and palpitations.   Respiratory: Negative for cough and shortness of breath.    Gastrointestinal: Negative for abdominal pain, nausea and vomiting.   Neurological: Negative for dizziness, focal weakness, headaches, light-headedness and numbness.   All other systems reviewed and are negative.      Hydrocodone-acetaminophen, Latex, Reglan [metoclopramide], Aripiprazole, and Sulfa antibiotics    Scheduled Meds:aspirin, 81 mg, Oral, Daily  aspirin, 325 mg, Oral, Once  atorvastatin, 40 mg, Oral, Nightly  budesonide, 0.5 mg, Nebulization, BID - RT  bumetanide, 1 mg, Oral, BID  chlorhexidine, 15 mL, Mouth/Throat, Q12H  guaiFENesin, 1,200 mg, Oral, Q12H  insulin lispro, 2-7 Units, Subcutaneous, 4x Daily With Meals & Nightly  ipratropium-albuterol, 3 mL, Nebulization, 4x Daily - RT  lamoTRIgine, 200 mg, Oral, Daily  levothyroxine, 150 mcg, Oral, Q AM  metoprolol tartrate, 12.5 mg, Oral, Q12H  pantoprazole, 40 mg, Oral, Q AM  polyethylene glycol, 17 g, Oral, BID  potassium chloride, 20 mEq, Oral, BID  senna-docusate sodium, 2 tablet, Oral, BID  sucralfate, 1 g, Oral, 4x Daily AC & at Bedtime  traZODone, 50 mg, Oral, Nightly  venlafaxine XR, 150 mg, Oral, Daily With Breakfast      Continuous Infusions:  "  PRN Meds:.•  acetaminophen **OR** acetaminophen **OR** acetaminophen  •  ALPRAZolam  •  cyclobenzaprine  •  dextrose  •  dextrose  •  glucagon (human recombinant)  •  ipratropium-albuterol  •  [DISCONTINUED] Morphine **AND** naloxone  •  ondansetron  •  potassium chloride **OR** potassium chloride        VITAL SIGNS  Vitals:    03/01/23 0808 03/01/23 0811 03/01/23 0842 03/01/23 1200   BP:   97/62 118/56   BP Location:    Left arm   Patient Position:    Lying   Pulse: 74 74  83   Resp: 18 18  22   Temp:    97.9 °F (36.6 °C)   TempSrc:    Oral   SpO2: 94% 98%  95%   Weight:       Height:           Flowsheet Rows    Flowsheet Row First Filed Value   Admission Height 172.7 cm (68\") Documented at 02/11/2023 0859   Admission Weight 115 kg (253 lb 15.5 oz) Documented at 02/11/2023 0859           TELEMETRY: Sinus rhythm    Physical Exam:  Constitutional:       Appearance: Well-developed.   Eyes:      General: No scleral icterus.     Conjunctiva/sclera: Conjunctivae normal.   HENT:      Head: Normocephalic and atraumatic.   Neck:      Vascular: No carotid bruit or JVD.   Pulmonary:      Effort: Pulmonary effort is normal.      Breath sounds: Normal breath sounds. No wheezing. No rales.   Cardiovascular:      Normal rate. Regular rhythm.      click. Mechanical mitral valve present   Pulses:     Intact distal pulses.   Abdominal:      General: Bowel sounds are normal.      Palpations: Abdomen is soft.   Musculoskeletal:      Cervical back: Normal range of motion and neck supple. Skin:     General: Skin is warm and dry.      Findings: No rash.   Neurological:      Mental Status: Alert.          Results Review:   I reviewed the patient's new clinical results.  Lab Results (last 24 hours)     Procedure Component Value Units Date/Time    POC Glucose Once [[id number]]  (Normal) Collected: 03/01/23 0750    Specimen: Blood Updated: 03/01/23 0754     Glucose 95 mg/dL      Comment: Serial Number: [id number]:  [id number]       " Basic Metabolic Panel [[id number]]  (Abnormal) Collected: 03/01/23 0452    Specimen: Blood Updated: 03/01/23 0527     Glucose 103 mg/dL      BUN 15 mg/dL      Creatinine 1.02 mg/dL      Sodium 135 mmol/L      Potassium 4.3 mmol/L      Chloride 94 mmol/L      CO2 29.0 mmol/L      Calcium 9.2 mg/dL      BUN/Creatinine Ratio 14.7     Anion Gap 12.0 mmol/L      eGFR 67.6 mL/min/1.73     Narrative:      GFR Normal >60  Chronic Kidney Disease <60  Kidney Failure <15      Protime-INR [[id number]]  (Abnormal) Collected: 03/01/23 0452    Specimen: Blood Updated: 03/01/23 0512     Protime 29.3 Seconds      INR 3.03    CBC (No Diff) [[id number]]  (Abnormal) Collected: 03/01/23 0452    Specimen: Blood Updated: 03/01/23 0509     WBC 6.50 10*3/mm3      RBC 3.97 10*6/mm3      Hemoglobin 9.3 g/dL      Hematocrit 29.1 %      MCV 73.2 fL      MCH 23.4 pg      MCHC 31.9 g/dL      RDW 34.6 %      RDW-SD 83.1 fl      MPV 7.4 fL      Platelets 397 10*3/mm3     POC Glucose Once [[id number]]  (Abnormal) Collected: 02/28/23 2119    Specimen: Blood Updated: 02/28/23 2120     Glucose 124 mg/dL      Comment: Serial Number: [id number]:  [id number]       [employer] ([facility]) [[id number]] Collected: 02/20/23 1409    Specimen: Bone Marrow from Sternum - Biopsy Updated: 02/28/23 1830     [employer] Result Comment^Text^TXT     Comment: 46,XX[20]  Normal Female Karyotype  DISCLAIMER: REFER TO HARDCOPY OR PDF FOR COMPLETE RESULT.   If synopsis provided, clinical decisions should not be   based on this interfaced synopsis alone.  Performed at:  1 - [facility]  [address] [address]  :  Josy Bush M.D., Ph.D., Phone:  [phone]       Narrative:      Performed at:  1 - [facility]  [address] [address]  :  Josy Bush M.D., Ph.D., Phone:  [phone]    POC Glucose Once [[id number]]  (Normal)  Collected: 02/28/23 1647    Specimen: Blood Updated: 02/28/23 1648     Glucose 79 mg/dL      Comment: Serial Number: [id number]:  [id number]             Imaging Results (Last 24 Hours)     ** No results found for the last 24 hours. **          EKG          I personally viewed and interpreted the patient's EKG/Telemetry data:    ECHOCARDIOGRAM:  Results for orders placed during the hospital encounter of 02/11/23    Adult Transthoracic Echo Limited W/ Cont if Necessary Per Protocol    Interpretation Summary  •  Left ventricular ejection fraction appears to be 56 - 60%.  •  There is a prosthetic mitral valve present.  •  Technically difficult study with limited views  •  No pericardial effusion noted       STRESS MYOVIEW:       CARDIAC CATHETERIZATION:  No results found for this or any previous visit.       OTHER:         Assessment & Plan     Principal Problem:    Congestive heart failure, unspecified HF chronicity, unspecified heart failure type (HCC)  Active Problems:    Nonrheumatic mitral valve regurgitation    Coronary artery disease of native artery of native heart with stable angina pectoris (HCC)  Shortness of breath  Pulmonary valve stenosis  Anemia  Hypothyroidism    Altagracia Tiwari is a 47 year old woman who has been diagnosed with severe mitral regurgitation and 80% ostial LAD disease.  She is now status post LIMA to LAD, mechanical mitral valve replacement with 27 mm [place] Madan prosthesis, PFO closure, left atrial appendage ligation and bone marrow biopsy from sternum on 2/20/2023.  His recent echocardiogram shows LV function is preserved and EF is 65%.  She is now on aspirin, beta-blocker and high intensity statin.  Patient is on warfarin and INR is therapeutic therefore, heparin has been discontinued  Hematology would like to transfuse if hemoglobin drops below 7, H&H today 8.4/26 point  Iron replacement is ongoing  She is on Bumex, renal function is normal with creatinine of 0.89 and eGFR of 79.6  today  Encourage ambulation and incentive spirometry  Patient is actually being discharged to home with home health  P patient is on warfarin and INR levels are around 3 and will keep it around 3 for her valve  Patient will be followed in the office       Barrie Burns MD  03/01/23  12:36 EST

## 2023-03-01 NOTE — PROGRESS NOTES
S/P POD# 9 urgent MECHANICAL MVR/ CABG x2 with LIMA/ PFO closure/ YOHAN ligation/ bone marrow bx from sternum--Adilene  EF 60-65% (GATO)    Subjective: ready to go home    No events overnight  INR 3.03 today  Wt is down 2 kgs from preop  Bone marrow cytology 2/20--No significant immunophenotypic abnormalities of myeloid and lymphoid cells   Bone marrow pathology 2/20--normal, low iron stores, no lymphoma      Intake/Output Summary (Last 24 hours) at 3/1/2023 1646  Last data filed at 3/1/2023 1400  Gross per 24 hour   Intake 780 ml   Output 2900 ml   Net -2120 ml     Temp:  [97.5 °F (36.4 °C)-99.3 °F (37.4 °C)] 97.9 °F (36.6 °C)  Heart Rate:  [74-97] 83  Resp:  [18-22] 22  BP: ()/(51-71) 118/56      Results from last 7 days   Lab Units 03/01/23  0452 02/28/23  0713 02/27/23  0503   WBC 10*3/mm3 6.50 5.90 6.50   HEMOGLOBIN g/dL 9.3* 8.8* 8.4*   HEMATOCRIT % 29.1* 28.8* 26.5*   PLATELETS 10*3/mm3 397 411 379   INR  3.03* 3.47* 2.53     Results from last 7 days   Lab Units 03/01/23  0452   CREATININE mg/dL 1.02*   POTASSIUM mmol/L 4.3   SODIUM mmol/L 135*       Physical Exam:  Neuro intact, nad, up in chair  Tele:  SR 80s, mechanical heart tone  Diminished bases, 92% RA  Sternotomy/ SVHS healing well  Benign abd, + BM  No edema    Assessment/Plan:  Principal Problem:    Congestive heart failure, unspecified HF chronicity, unspecified heart failure type (HCC)  Active Problems:    Nonrheumatic mitral valve regurgitation    Coronary artery disease of native artery of native heart with stable angina pectoris (HCC)    S/P mechanical MVR per Dr. Head 2/20/2023    S/P CABG x 1 with JAY per Dr. Head 2/20/2023    PFO (patent foramen ovale) 2/20/2023    - Severe mitral valve regurgitation/ mild to moderate tricuspid regurgitation, EF 60-65% (GATO)--surgical workup in progress  - CAD--LAD disease  - HFpEF, unknown chronicity  - Acute on chronic anemia/ HELEN--EGD/colonoscopy negative, heme consulted on 2/12 and recommend  outpatient pill enteroscopy, transfused preop 2/17, 2/18, 2/19  - Sjogren's syndrome, psoriatic arthritis (followed by Dr. Estrada)--Cizmia use  - Obesity, stage 2--BMI 38.62, hx of gastric sleeve  - Hypothyroidism--levothyroxine  - Asthma  - Hx of essential tremor, s/p deep brain stimulator (3/2022), followed by Dr. Quezada  - Hypertriglyceridemia  - GERD  - Depression/anxiety--cont home meds  - IBS  - RML lung nodule found of CT of chest (2/11/23)--hematology recommend repeat CT as outpatient in 3 months  - Postop ABLA, expected--transfused 2/20  - Postop leukocytosis, likely atel--aggressive pulm toileting, Mucomyst nebs      POD# 8.  INR 3.03.  Continues to report mild SOA with ambulation.  Replete e-.  Mobilize. Coumadin today for mechanical MVR--4 mg.  Cont oral diuretics. Home meds--lamotrigine, levothyroxine, Effexor, Xanax and Trazodone  On asa/statin/bb.  Care mmgt to setup outpt PT at [facility].   are working to help get outpt services arranged for pt.    Addendum:  DC home.  Warfarin 4 mg tabs given to pt.  D/w Anai Ceja NP--she is working on [facility] appt.    Routine care--as above  D/w pt/family, Dr. Samuels, care mmgt  Anticipate rehab vs home at discharge    Tahmina Patel, APRN  3/1/2023  16:46 EST

## 2023-03-02 ENCOUNTER — TELEPHONE (OUTPATIENT)
Dept: CARDIOLOGY | Facility: CLINIC | Age: 48
End: 2023-03-02
Payer: COMMERCIAL

## 2023-03-02 LAB
BH BB BLOOD EXPIRATION DATE: NORMAL
BH BB BLOOD TYPE BARCODE: 5100
BH BB DISPENSE STATUS: NORMAL
BH BB PRODUCT CODE: NORMAL
BH BB UNIT NUMBER: NORMAL
CROSSMATCH INTERPRETATION: NORMAL
UNIT  ABO: NORMAL
UNIT  RH: NORMAL

## 2023-03-02 NOTE — TELEPHONE ENCOUNTER
Pt discharged yesterday s/p CABG x 1 and MVR. Scheduled in office for INR check tomorrow 3/3/23.       Thanks

## 2023-03-02 NOTE — OUTREACH NOTE
Prep Survey    Flowsheet Row Responses   Sp[facility] facility patient discharged from? [facility]   Is LACE score < 7 ? No   Eligibility Readm Mgmt   Discharge diagnosis CHF,  mitral valve repair/replacement   Does the patient have one of the following disease processes/diagnoses(primary or secondary)? Cardiothoracic surgery   Does the patient have Home health ordered? No   Is there a DME ordered? No   Prep survey completed? Yes          Farnaz TAI - Registered Nurse

## 2023-03-02 NOTE — PAYOR COMM NOTE
"This is discharge notification for Diane Tiwari  Reference/Auth # [id number]  Pt discharged routine to home on 3/1/23--with outpatient physical therapy set up.    Zee Catalan RN, BSN  Utilization Review Nurse  [facility]  Direct & confidential phone # [phone]  Fax # [phone]      Diane Tiwari (47 y.o. Female)     Date of Birth   1975    Social Security Number       Address   [address] [place] [address]    Home Phone   [phone]    MRN   [id number]       Presybeterian   None    Marital Status   Single                            Admission Date   2/11/23    Admission Type   Emergency    Admitting Provider   Ani Sullivan MD    Attending Provider       Department, Room/Bed   [facility] CARDIOVASCULAR CARE UNIT, 2211/1       Discharge Date   3/1/2023    Discharge Disposition   Home-Health Care Svc    Discharge Destination                               Attending Provider: (none)   Allergies: Hydrocodone-acetaminophen, Latex, Reglan [Metoclopramide], Aripiprazole, Sulfa Antibiotics    Isolation: None   Infection: None   Code Status: Prior    Ht: 165.1 cm (65\")   Wt: 113 kg (250 lb 3.2 oz)    Admission Cmt: None   Principal Problem: Congestive heart failure, unspecified HF chronicity, unspecified heart failure type (HCC) [I50.9]                 Active Insurance as of 2/11/2023     Primary Coverage     Payor Plan Insurance Group Employer/Plan Group    MISC COMMERCIAL MISC COMMERCIAL [id number]     Coverage Address Coverage Phone Number Coverage Fax Number Effective Dates    [address] [phone]  1/16/2023 - None Entered    [place] [address]       Subscriber Name Subscriber Birth Date Member ID       DIANE TIWARI 1975 [id number]                 Emergency Contacts      (Rel.) Home Phone Work Phone Mobile Phone    MonicaMirna (Mother) [phone] -- --    sweetie orta (Sister) -- -- [phone]               Discharge Summary      Valentino, " TOPHER Drummond at 03/01/23 1123          Date of Admission:  2/11/2023  Date of Discharge:  3/1/2023    Discharge Diagnosis:    - Severe mitral valve regurgitation now s/p MVR (mechanical)  - Mild to moderate tricuspid regurgitation  - Normal LV systolic function---EF 60-65% (GATO)  - CAD now s/p CABG x 1   - HFpEF, unknown chronicity  - Acute on chronic anemia/ HELEN---outpatient pill enteroscopy recommended  - Sjogren's syndrome, psoriatic arthritis   - Obesity, stage 2--BMI 38.62, hx of gastric sleeve  - Hypothyroidism  - Asthma  - Hx of essential tremor, s/p deep brain stimulator (3/2022), followed by Dr. Quezada  - Hypertriglyceridemia  - GERD  - Depression/anxiety  - IBS  - RML lung nodule found of CT of chest (2/11/23)--hematology recommend repeat CT as outpatient in 3 months  - Postop ABLA, expected  - Postop leukocytosis    Presenting Problem/History of Present Illness:  - Severe mitral valve regurgitation   - Mild to moderate tricuspid regurgitation  - Normal LV systolic function---EF 60-65% (GATO)  - CAD   - HFpEF, unknown chronicity  - Acute on chronic anemia/ HELEN---outpatient pill enteroscopy recommended  - Sjogren's syndrome, psoriatic arthritis   - Obesity, stage 2--BMI 38.62, hx of gastric sleeve  - Hypothyroidism  - Asthma  - Hx of essential tremor, s/p deep brain stimulator (3/2022), followed by Dr. Quezada  - Hypertriglyceridemia  - GERD  - Depression/anxiety  - IBS  - RML lung nodule found of CT of chest (2/11/23)--hematology recommend repeat CT as outpatient in 3 months    Hospital Course:   Patient is a 47 y.o. female who presented to the ED on 2/11/23 with a chief complaint of chest pain and shortness of breath. She was found to have a murmur and be anemic with a Hgb of 7.7, so she was admitted for further evaluation and treatment. A CT of the chest revealed a right middle lobe lung nodule and repeat imaging is recommended in 3 months. A transthoracic echocardiogram was performed on 2/12/23  demonstrating severe mitral valve regurgitation with moderate to severe mitral valve stenosis. Cardiac catheterization was performed on 2/15/23 revealing severe LAD CAD. Cardiac Surgery was subsequently consulted and recommended mitral valve replacement and CABG. Prior to surgery patient was transfused PRBC on 2/17/23, 2/18/23, and 2/19/23. An outpatient pill enteroscopy has also been recommended as an outpatient. On 2/20/23 she was taken to the Operating Room and under general anesthesia and via median sternotomy underwent CABG x 1, mitral valve replacement with a 27 mm St. Madan mechanical prosthesis, PFO closure, and open bone marrow biopsy by Dr. Head. Final pathology on bone marrow biopsy revealed no significant immunophenotypic abnormalities of myeloid and lymphoid cells. Patient tolerated the procedure well and was transported to the Cardiovascular Care Unit in stable condition. The next morning patient was successfully weaned from the ventilator and extubated at approximately 1100. Initially post-operatively patient required low dose Epinephrine, low dose Edward synephrine, and Milrinone, but was weaned off aThroughout her post-operative course she was followed by Cardiac Surgery, Cardiology, Hematology, Physical Therapy, and Occupational Therapy. On post-operative day #2 she was started on Coumadin for her mechanical mitral valve. On post-operative day #3 a Heparin drip was also started. On post-operative day #7 her deep brain stimulator was restarted. The rest of her hospital course was uneventful and one of gradual improvement. On post-operative day #9, on 3/1/23, she was deemed stable for discharge home with outpatient physical therapy. She was discharged on aspirin, statin, beta blocker, and Coumadin for her mechanical mitral valve. Goal INR 2.5 - 3.5. INR on 3/1/23 was 3.03. Dr. Burns will be managing her Coumadin therapy.                 Procedures Performed:  Procedure(s) 2/20/2023 Dr. Head:   1.   Urgent CABG x1 with a LIMA to the mid LAD (CPT code 02190)   2.  Mitral valve replacement with a 27 mm St. Madan mechanical  prosthesis (CPT code 84855)   3.  PFO closure (CPT code 62667)   4.  Open bone marrow biopsy       Consults:   Consults     Date and Time Order Name Status Description    2/20/2023  5:32 PM Inpatient Cardiology Consult      2/11/2023  2:47 PM Hematology & Oncology Inpatient Consult Completed     2/11/2023  2:23 PM Inpatient Cardiology Consult Completed           Pertinent Test Results:    Lab Results   Component Value Date    WBC 6.50 03/01/2023    HGB 9.3 (L) 03/01/2023    HCT 29.1 (L) 03/01/2023    MCV 73.2 (L) 03/01/2023     03/01/2023      Lab Results   Component Value Date    GLUCOSE 103 (H) 03/01/2023    CALCIUM 9.2 03/01/2023     (L) 03/01/2023    K 4.3 03/01/2023    CO2 29.0 03/01/2023    CL 94 (L) 03/01/2023    BUN 15 03/01/2023    CREATININE 1.02 (H) 03/01/2023    EGFRIFAFRI >59 08/08/2022    EGFRIFNONA 59 (A) 08/08/2022    BCR 14.7 03/01/2023    ANIONGAP 12.0 03/01/2023     Lab Results   Component Value Date    INR 3.03 (H) 03/01/2023    PROTIME 29.3 (H) 03/01/2023       Condition on Discharge: Stable     Vital Signs  Temp:  [97.5 °F (36.4 °C)-99.3 °F (37.4 °C)] 97.9 °F (36.6 °C)  Heart Rate:  [74-97] 83  Resp:  [18-22] 22  BP: ()/(51-71) 118/56  Body mass index is 41.64 kg/m².    Discharge Disposition  Home-Health Care Svc    Discharge Medications     Discharge Medications      New Medications      Instructions Start Date   aspirin 81 MG EC tablet   81 mg, Oral, Daily   Start Date: March 2, 2023     atorvastatin 40 MG tablet  Commonly known as: LIPITOR   40 mg, Oral, Nightly      bumetanide 1 MG tablet  Commonly known as: BUMEX   Take 1 tablet by mouth 2 (Two) Times a Day for 7 days, THEN 1 tablet Daily for 7 days.   Start Date: March 1, 2023     cyclobenzaprine 10 MG tablet  Commonly known as: FLEXERIL   10 mg, Oral, 3 Times Daily PRN      metoprolol tartrate 25  MG tablet  Commonly known as: LOPRESSOR   12.5 mg, Oral, Every 12 Hours Scheduled      potassium chloride 10 MEQ CR capsule  Commonly known as: MICRO-K   Take 2 capsules by mouth 2 (Two) Times a Day for 7 days, THEN 2 capsules Daily for 21 days.   Start Date: March 1, 2023     sucralfate 1 g tablet  Commonly known as: CARAFATE   1 g, Oral, 4 Times Daily Before Meals & Nightly      warfarin 4 MG tablet  Commonly known as: Coumadin   Take 4 mg daily or as directed by Dr. Burns.  For mechanical heart valve.         Continue These Medications      Instructions Start Date   acetaminophen 500 MG tablet  Commonly known as: TYLENOL   500 mg, Oral, Every 6 Hours PRN      Advair Diskus 250-50 MCG/ACT DISKUS  Generic drug: Fluticasone-Salmeterol   INHALE 1 PUFF BY MOUTH TWICE DAILY      ALPRAZolam 0.25 MG tablet  Commonly known as: XANAX   0.25 mg, Oral, 2 Times Daily      esomeprazole 40 MG capsule  Commonly known as: nexIUM   40 mg, Oral, Every Morning Before Breakfast      lamoTRIgine 200 MG tablet  Commonly known as: LaMICtal   200 mg, Oral, Daily      levothyroxine 150 MCG tablet  Commonly known as: SYNTHROID, LEVOTHROID   150 mcg, Oral, Daily      nystatin 322223 UNIT/GM powder  Commonly known as: MYCOSTATIN   Topical, 2 Times Daily PRN      traZODone 100 MG tablet  Commonly known as: DESYREL   100 mg, Oral, Nightly      venlafaxine  MG 24 hr capsule  Commonly known as: EFFEXOR-XR   150 mg, Oral, Daily      Ventolin  (90 Base) MCG/ACT inhaler  Generic drug: albuterol sulfate HFA   INHALE 1 TO 2 PUFFS BY MOUTH EVERY 6 HOURS AS NEEDED      albuterol 0.63 MG/3ML nebulizer solution  Commonly known as: ACCUNEB   0.63 mg, Nebulization, Every 6 Hours PRN         Stop These Medications    Cimzia 2 X 200 MG kit  Generic drug: Certolizumab Pegol            Discharge Diet: Healthy heart    Activity at Discharge:    · No driving x 2 weeks or while taking narcotic pain medication  · No lifting greater than 10 pounds x 6  weeks  · Ambulate at least 10 minutes 3 times a day    Follow-up Appointments  Future Appointments   Date Time Provider Department Center   3/22/2023  9:00 AM Anai Ceja APRN [facility]   3/23/2023 12:45 PM Tahmina Patel APRN [facility]     Additional Instructions for the Follow-ups that You Need to Schedule     Ambulatory Referral to Physical Therapy Evaluate and treat, POST OP; Strengthening   As directed      Add Scheduling Instructions here    Specialty needed: Evaluate and treat POST OP    Exercises: Strengthening    Follow-up needed: Yes         Call MD With Problems / Concerns   As directed      Instructions:  Call office at [phone] for any drainage, increased redness, or fever over 100.5    Order Comments: Instructions:  Call office at [phone] for any drainage, increased redness, or fever over 100.5          Discharge Follow-up with PCP   As directed       Currently Documented PCP:    Marsha Parson DO    PCP Phone Number:    [phone]     Follow Up Details: in 1 week         Discharge Follow-up with Specialty: Cardiac surgery APRN/PA; 2 Weeks   As directed      Specialty: Cardiac surgery APRN/PA    Follow Up: 2 Weeks    Follow Up Details: 3/23/2023 at 12:45 pm         Discharge Follow-up with Specified Provider: Cardiologist; 3 Weeks   As directed      To: Cardiologist    Follow Up: 3 Weeks    Follow Up Details: NP follow up 3/22/2023 at 9 am         Discharge Follow-up with Specified Provider:    As directed      To:     Follow Up Details: 4-6 weeks, bring all current medications to appointment             **This Discharge Summary was written for completion of medical records. I was not personally involved in this patient's discharge from the hospital.        TOPHER Jaquez  03/01/23  16:55 EST              Electronically signed by Caitlin Avelar PA at 03/01/23 2687

## 2023-03-03 ENCOUNTER — ANTICOAGULATION VISIT (OUTPATIENT)
Dept: CARDIOLOGY | Facility: CLINIC | Age: 48
End: 2023-03-03
Payer: COMMERCIAL

## 2023-03-03 VITALS — SYSTOLIC BLOOD PRESSURE: 113 MMHG | HEART RATE: 82 BPM | DIASTOLIC BLOOD PRESSURE: 73 MMHG

## 2023-03-03 DIAGNOSIS — Z95.2 S/P MVR (MITRAL VALVE REPLACEMENT): Primary | ICD-10-CM

## 2023-03-03 LAB — INR PPP: 3.2 (ref 0.9–1.1)

## 2023-03-03 PROCEDURE — 85610 PROTHROMBIN TIME: CPT | Performed by: INTERNAL MEDICINE

## 2023-03-03 PROCEDURE — 36416 COLLJ CAPILLARY BLOOD SPEC: CPT | Performed by: INTERNAL MEDICINE

## 2023-03-03 NOTE — DISCHARGE PLACEMENT REQUEST
"Diane Tiwari (47 y.o. Female)     Date of Birth   1975    Social Security Number       Address    [place] [id number]    Home Phone   [phone]    MRN   [id number]       Buddhist   None    Marital Status   Single                            Admission Date   23    Admission Type   Emergency    Admitting Provider   Ani Sullivan MD    Attending Provider       Department, Room/Bed   [facility] CARDIOVASCULAR CARE UNIT,        Discharge Date   3/1/2023    Discharge Disposition   Home-Health Care Sv    Discharge Destination                               Attending Provider: (none)   Allergies: Hydrocodone-acetaminophen, Latex, Reglan [Metoclopramide], Aripiprazole, Sulfa Antibiotics    Isolation: None   Infection: None   Code Status: Prior    Ht: 165.1 cm (65\")   Wt: 113 kg (250 lb 3.2 oz)    Admission Cmt: None   Principal Problem: Congestive heart failure, unspecified HF chronicity, unspecified heart failure type (HCC) [I50.9]                 Active Insurance as of 2023     Primary Coverage     Payor Plan Insurance Group Employer/Plan Group    MISC COMMERCIAL MISC COMMERCIAL [id number]     Coverage Address Coverage Phone Number Coverage Fax Number Effective Dates    [address] [phone]  2023 - None Entered    [facility] [id number]       Subscriber Name Subscriber Birth Date Member ID       DINAE TIWARI 1975 [id number]                 Emergency Contacts      (Rel.) Home Phone Work Phone Mobile Phone    Mirna Anderson (Mother) [phone] -- --    sweetie orta (Sister) -- -- [phone]            [facility] CARDIOVASCULAR CARE UNIT  [address] [place] [address] [place] [address]  Phone:  [phone]  Fax:  [phone] Date: Mar 1, 2023      Ambulatory Referral to Physical Therapy Evaluate and treat, POST OP; Strengthening     Patient:  Diane Tiwari MRN:  [id number]   [facility] [address] :  1975  SSN: "    Phone: [phone] Sex:  F      INSURANCE PAYOR PLAN GROUP # SUBSCRIBER ID   Primary:    MISC COMMERCIAL [id number]      Referring Provider Information:  KIZZY RAY Phone: [phone] Fax: [phone]       Referral Information:   # Visits:  1 Referral Type: Physical Therapy [AE1]   Urgency:  Routine Referral Reason: Specialty Services Required   Start Date: Mar 1, 2023 End Date:  To be determined by Insurer   Diagnosis: S/P CABG x 1 (Z95.1 [ICD-10-CM] V45.81 [ICD-9-CM])  S/P MVR (mitral valve replacement) (Z95.2 [ICD-10-CM] V43.3 [ICD-9-CM])  PFO (patent foramen ovale) (Q21.12 [ICD-10-CM] 745.5 [ICD-9-CM])      Refer to Dept:   Refer to Provider:   Refer to Provider Phone:   Refer to Facility:       Specialty needed: Evaluate and treat  Specialty needed: POST OP  Exercises: Strengthening  Follow-up needed: Yes     This document serves as a request of services and does not constitute Insurance authorization or approval of services.  To determine eligibility, please contact the members Insurance carrier to verify and review coverage.     If you have medical questions regarding this request for services. Please contact [facility] CARDIOVASCULAR CARE UNIT at [phone] during normal business hours.        Authorizing Provider:Kizzy Ray APRN  Authorizing Provider's NPI: [id number]  Order Entered By: Kizzy Ray APRN 3/1/2023  3:48 PM     Electronically signed by: Kizzy Ray APRN 3/1/2023  3:48 PM

## 2023-03-07 ENCOUNTER — ANTICOAGULATION VISIT (OUTPATIENT)
Dept: CARDIOLOGY | Facility: CLINIC | Age: 48
End: 2023-03-07
Payer: COMMERCIAL

## 2023-03-07 VITALS — HEART RATE: 89 BPM | SYSTOLIC BLOOD PRESSURE: 129 MMHG | DIASTOLIC BLOOD PRESSURE: 86 MMHG

## 2023-03-07 DIAGNOSIS — Z95.2 S/P MVR (MITRAL VALVE REPLACEMENT): Primary | ICD-10-CM

## 2023-03-07 LAB — INR PPP: 3.2 (ref 0.9–1.1)

## 2023-03-07 PROCEDURE — 85610 PROTHROMBIN TIME: CPT | Performed by: INTERNAL MEDICINE

## 2023-03-07 PROCEDURE — 36416 COLLJ CAPILLARY BLOOD SPEC: CPT | Performed by: INTERNAL MEDICINE

## 2023-03-08 ENCOUNTER — TELEPHONE (OUTPATIENT)
Dept: CARDIAC SURGERY | Facility: CLINIC | Age: 48
End: 2023-03-08
Payer: COMMERCIAL

## 2023-03-08 NOTE — TELEPHONE ENCOUNTER
Pt showed up at hospital for me to look at her chest tube exit sites.  Left chest exit site without scab and slightly moist. Other 2 CT exit sites with dry scab noted.  Instructed to continue to clean surgical wounds with CHG soap and keep dry.  Sternotomy healing well.  She is looking better and feeling better.

## 2023-03-10 ENCOUNTER — TELEPHONE (OUTPATIENT)
Dept: CARDIAC SURGERY | Facility: CLINIC | Age: 48
End: 2023-03-10
Payer: COMMERCIAL

## 2023-03-10 RX ORDER — CYCLOBENZAPRINE HCL 10 MG
10 TABLET ORAL 3 TIMES DAILY PRN
Qty: 20 TABLET | Refills: 0 | Status: SHIPPED | OUTPATIENT
Start: 2023-03-10 | End: 2023-03-23 | Stop reason: SDUPTHER

## 2023-03-10 NOTE — TELEPHONE ENCOUNTER
Patient requested Flexeril refill. Discussed with Violette Johnson DNP. Per Violette odell to refill Flexeril 10mg #20 no refills.     Notified patient of Flexeril RX. She verbalized understanding and this was agreeable.

## 2023-03-14 ENCOUNTER — READMISSION MANAGEMENT (OUTPATIENT)
Dept: CALL CENTER | Facility: HOSPITAL | Age: 48
End: 2023-03-14
Payer: COMMERCIAL

## 2023-03-14 NOTE — OUTREACH NOTE
CT Surgery Week 2 Survey    Flowsheet Row Responses   Buddhist facility patient discharged from? [facility]   Does the patient have one of the following disease processes/diagnoses(primary or secondary)? Cardiothoracic surgery   Week 2 attempt successful? No   Unsuccessful attempts Attempt 1          Ashley Hernandez Nurse

## 2023-03-16 ENCOUNTER — APPOINTMENT (OUTPATIENT)
Dept: GENERAL RADIOLOGY | Facility: HOSPITAL | Age: 48
End: 2023-03-16
Payer: COMMERCIAL

## 2023-03-16 ENCOUNTER — HOSPITAL ENCOUNTER (OUTPATIENT)
Facility: HOSPITAL | Age: 48
Setting detail: OBSERVATION
Discharge: HOME | End: 2023-03-17
Attending: EMERGENCY MEDICINE | Admitting: INTERNAL MEDICINE
Payer: COMMERCIAL

## 2023-03-16 ENCOUNTER — READMISSION MANAGEMENT (OUTPATIENT)
Dept: CALL CENTER | Facility: HOSPITAL | Age: 48
End: 2023-03-16
Payer: COMMERCIAL

## 2023-03-16 ENCOUNTER — TELEPHONE (OUTPATIENT)
Dept: CARDIAC SURGERY | Facility: CLINIC | Age: 48
End: 2023-03-16
Payer: COMMERCIAL

## 2023-03-16 DIAGNOSIS — Z95.1 S/P CABG X 1: ICD-10-CM

## 2023-03-16 DIAGNOSIS — I95.1 ORTHOSTATIC HYPOTENSION: Primary | ICD-10-CM

## 2023-03-16 DIAGNOSIS — Z95.2 S/P MVR (MITRAL VALVE REPLACEMENT): Primary | ICD-10-CM

## 2023-03-16 LAB
ALBUMIN SERPL-MCNC: 4.1 G/DL (ref 3.5–5.2)
ALBUMIN/GLOB SERPL: 0.9 G/DL
ALP SERPL-CCNC: 162 U/L (ref 39–117)
ALT SERPL W P-5'-P-CCNC: 15 U/L (ref 1–33)
ANION GAP SERPL CALCULATED.3IONS-SCNC: 11 MMOL/L (ref 5–15)
ANISOCYTOSIS BLD QL: NORMAL
APTT PPP: 53.2 SECONDS (ref 61–76.5)
AST SERPL-CCNC: 25 U/L (ref 1–32)
BASOPHILS # BLD AUTO: 0 10*3/MM3 (ref 0–0.2)
BASOPHILS NFR BLD AUTO: 0.7 % (ref 0–1.5)
BILIRUB SERPL-MCNC: 0.7 MG/DL (ref 0–1.2)
BUN SERPL-MCNC: 11 MG/DL (ref 6–20)
BUN/CREAT SERPL: 11.5 (ref 7–25)
CALCIUM SPEC-SCNC: 10.2 MG/DL (ref 8.6–10.5)
CHLORIDE SERPL-SCNC: 98 MMOL/L (ref 98–107)
CO2 SERPL-SCNC: 28 MMOL/L (ref 22–29)
CREAT SERPL-MCNC: 0.96 MG/DL (ref 0.57–1)
DEPRECATED RDW RBC AUTO: 84 FL (ref 37–54)
EGFRCR SERPLBLD CKD-EPI 2021: 72.7 ML/MIN/1.73
ELLIPTOCYTES BLD QL SMEAR: NORMAL
EOSINOPHIL # BLD AUTO: 0.1 10*3/MM3 (ref 0–0.4)
EOSINOPHIL NFR BLD AUTO: 2 % (ref 0.3–6.2)
ERYTHROCYTE [DISTWIDTH] IN BLOOD BY AUTOMATED COUNT: 32.8 % (ref 12.3–15.4)
GEN 5 2HR TROPONIN T REFLEX: 40 NG/L
GLOBULIN UR ELPH-MCNC: 4.4 GM/DL
GLUCOSE SERPL-MCNC: 99 MG/DL (ref 65–99)
HCT VFR BLD AUTO: 38.1 % (ref 34–46.6)
HGB BLD-MCNC: 12.3 G/DL (ref 12–15.9)
INR PPP: 2.45 (ref 2–3)
INR PPP: 2.58 (ref 0.93–1.1)
LYMPHOCYTES # BLD AUTO: 1 10*3/MM3 (ref 0.7–3.1)
LYMPHOCYTES NFR BLD AUTO: 15.3 % (ref 19.6–45.3)
MAGNESIUM SERPL-MCNC: 2.3 MG/DL (ref 1.6–2.6)
MCH RBC QN AUTO: 24.3 PG (ref 26.6–33)
MCHC RBC AUTO-ENTMCNC: 32.2 G/DL (ref 31.5–35.7)
MCV RBC AUTO: 75.5 FL (ref 79–97)
MONOCYTES # BLD AUTO: 0.4 10*3/MM3 (ref 0.1–0.9)
MONOCYTES NFR BLD AUTO: 5.4 % (ref 5–12)
NEUTROPHILS NFR BLD AUTO: 5 10*3/MM3 (ref 1.7–7)
NEUTROPHILS NFR BLD AUTO: 76.6 % (ref 42.7–76)
NRBC BLD AUTO-RTO: 0.1 /100 WBC (ref 0–0.2)
PLAT MORPH BLD: NORMAL
PLATELET # BLD AUTO: 444 10*3/MM3 (ref 140–450)
PMV BLD AUTO: 8.2 FL (ref 6–12)
POIKILOCYTOSIS BLD QL SMEAR: NORMAL
POTASSIUM SERPL-SCNC: 4.2 MMOL/L (ref 3.5–5.2)
PROT SERPL-MCNC: 8.5 G/DL (ref 6–8.5)
PROTHROMBIN TIME: 24 SECONDS (ref 19.4–28.5)
PROTHROMBIN TIME: 25.2 SECONDS (ref 9.6–11.7)
RBC # BLD AUTO: 5.05 10*6/MM3 (ref 3.77–5.28)
SODIUM SERPL-SCNC: 137 MMOL/L (ref 136–145)
TROPONIN T DELTA: 1 NG/L
TROPONIN T SERPL HS-MCNC: 39 NG/L
WBC MORPH BLD: NORMAL
WBC NRBC COR # BLD: 6.6 10*3/MM3 (ref 3.4–10.8)

## 2023-03-16 PROCEDURE — 85007 BL SMEAR W/DIFF WBC COUNT: CPT | Performed by: EMERGENCY MEDICINE

## 2023-03-16 PROCEDURE — G0378 HOSPITAL OBSERVATION PER HR: HCPCS

## 2023-03-16 PROCEDURE — 85610 PROTHROMBIN TIME: CPT | Performed by: EMERGENCY MEDICINE

## 2023-03-16 PROCEDURE — 80053 COMPREHEN METABOLIC PANEL: CPT | Performed by: EMERGENCY MEDICINE

## 2023-03-16 PROCEDURE — 84484 ASSAY OF TROPONIN QUANT: CPT | Performed by: EMERGENCY MEDICINE

## 2023-03-16 PROCEDURE — 94640 AIRWAY INHALATION TREATMENT: CPT

## 2023-03-16 PROCEDURE — 99254 IP/OBS CNSLTJ NEW/EST MOD 60: CPT | Performed by: INTERNAL MEDICINE

## 2023-03-16 PROCEDURE — 96360 HYDRATION IV INFUSION INIT: CPT

## 2023-03-16 PROCEDURE — 96361 HYDRATE IV INFUSION ADD-ON: CPT

## 2023-03-16 PROCEDURE — 85025 COMPLETE CBC W/AUTO DIFF WBC: CPT | Performed by: EMERGENCY MEDICINE

## 2023-03-16 PROCEDURE — 99285 EMERGENCY DEPT VISIT HI MDM: CPT

## 2023-03-16 PROCEDURE — 94761 N-INVAS EAR/PLS OXIMETRY MLT: CPT

## 2023-03-16 PROCEDURE — 36415 COLL VENOUS BLD VENIPUNCTURE: CPT

## 2023-03-16 PROCEDURE — 85730 THROMBOPLASTIN TIME PARTIAL: CPT | Performed by: EMERGENCY MEDICINE

## 2023-03-16 PROCEDURE — 71045 X-RAY EXAM CHEST 1 VIEW: CPT

## 2023-03-16 PROCEDURE — 94799 UNLISTED PULMONARY SVC/PX: CPT

## 2023-03-16 PROCEDURE — 85610 PROTHROMBIN TIME: CPT | Performed by: INTERNAL MEDICINE

## 2023-03-16 PROCEDURE — 93005 ELECTROCARDIOGRAM TRACING: CPT | Performed by: EMERGENCY MEDICINE

## 2023-03-16 PROCEDURE — 83735 ASSAY OF MAGNESIUM: CPT | Performed by: EMERGENCY MEDICINE

## 2023-03-16 RX ORDER — TRAZODONE HYDROCHLORIDE 100 MG/1
100 TABLET ORAL NIGHTLY
Status: DISCONTINUED | OUTPATIENT
Start: 2023-03-16 | End: 2023-03-17 | Stop reason: HOSPADM

## 2023-03-16 RX ORDER — PANTOPRAZOLE SODIUM 40 MG/1
40 TABLET, DELAYED RELEASE ORAL
Status: DISCONTINUED | OUTPATIENT
Start: 2023-03-17 | End: 2023-03-17 | Stop reason: HOSPADM

## 2023-03-16 RX ORDER — LEVOTHYROXINE SODIUM 0.15 MG/1
150 TABLET ORAL
Status: DISCONTINUED | OUTPATIENT
Start: 2023-03-17 | End: 2023-03-17 | Stop reason: HOSPADM

## 2023-03-16 RX ORDER — SODIUM CHLORIDE 9 MG/ML
125 INJECTION, SOLUTION INTRAVENOUS CONTINUOUS
Status: DISCONTINUED | OUTPATIENT
Start: 2023-03-16 | End: 2023-03-17 | Stop reason: HOSPADM

## 2023-03-16 RX ORDER — ALBUTEROL SULFATE 0.63 MG/3ML
1 SOLUTION RESPIRATORY (INHALATION) EVERY 6 HOURS PRN
Status: DISCONTINUED | OUTPATIENT
Start: 2023-03-16 | End: 2023-03-17 | Stop reason: HOSPADM

## 2023-03-16 RX ORDER — BUDESONIDE AND FORMOTEROL FUMARATE DIHYDRATE 160; 4.5 UG/1; UG/1
1 AEROSOL RESPIRATORY (INHALATION)
Status: DISCONTINUED | OUTPATIENT
Start: 2023-03-16 | End: 2023-03-17 | Stop reason: HOSPADM

## 2023-03-16 RX ORDER — ALPRAZOLAM 0.25 MG/1
0.25 TABLET ORAL 2 TIMES DAILY
Status: DISCONTINUED | OUTPATIENT
Start: 2023-03-16 | End: 2023-03-17 | Stop reason: HOSPADM

## 2023-03-16 RX ORDER — SUCRALFATE 1 G/1
1 TABLET ORAL
Status: DISCONTINUED | OUTPATIENT
Start: 2023-03-16 | End: 2023-03-17 | Stop reason: HOSPADM

## 2023-03-16 RX ORDER — SODIUM CHLORIDE 0.9 % (FLUSH) 0.9 %
10 SYRINGE (ML) INJECTION AS NEEDED
Status: DISCONTINUED | OUTPATIENT
Start: 2023-03-16 | End: 2023-03-17 | Stop reason: HOSPADM

## 2023-03-16 RX ORDER — LAMOTRIGINE 100 MG/1
200 TABLET ORAL DAILY
Status: DISCONTINUED | OUTPATIENT
Start: 2023-03-16 | End: 2023-03-17 | Stop reason: HOSPADM

## 2023-03-16 RX ORDER — ACETAMINOPHEN 500 MG
500 TABLET ORAL EVERY 6 HOURS PRN
Status: DISCONTINUED | OUTPATIENT
Start: 2023-03-16 | End: 2023-03-17 | Stop reason: HOSPADM

## 2023-03-16 RX ORDER — ATORVASTATIN CALCIUM 40 MG/1
40 TABLET, FILM COATED ORAL NIGHTLY
Status: DISCONTINUED | OUTPATIENT
Start: 2023-03-16 | End: 2023-03-17 | Stop reason: HOSPADM

## 2023-03-16 RX ORDER — ASPIRIN 81 MG/1
81 TABLET ORAL DAILY
Status: DISCONTINUED | OUTPATIENT
Start: 2023-03-17 | End: 2023-03-17 | Stop reason: HOSPADM

## 2023-03-16 RX ORDER — VENLAFAXINE HYDROCHLORIDE 75 MG/1
150 CAPSULE, EXTENDED RELEASE ORAL DAILY
Status: DISCONTINUED | OUTPATIENT
Start: 2023-03-17 | End: 2023-03-17 | Stop reason: HOSPADM

## 2023-03-16 RX ORDER — WARFARIN SODIUM 4 MG/1
4 TABLET ORAL
Status: DISCONTINUED | OUTPATIENT
Start: 2023-03-16 | End: 2023-03-17 | Stop reason: HOSPADM

## 2023-03-16 RX ORDER — CYCLOBENZAPRINE HCL 10 MG
10 TABLET ORAL 3 TIMES DAILY PRN
Status: DISCONTINUED | OUTPATIENT
Start: 2023-03-16 | End: 2023-03-17 | Stop reason: HOSPADM

## 2023-03-16 RX ADMIN — Medication 12.5 MG: at 20:28

## 2023-03-16 RX ADMIN — SUCRALFATE 1 G: 1 TABLET ORAL at 20:34

## 2023-03-16 RX ADMIN — ATORVASTATIN CALCIUM 40 MG: 40 TABLET, FILM COATED ORAL at 20:28

## 2023-03-16 RX ADMIN — SODIUM CHLORIDE 1000 ML: 9 INJECTION, SOLUTION INTRAVENOUS at 12:04

## 2023-03-16 RX ADMIN — LAMOTRIGINE 200 MG: 100 TABLET ORAL at 20:34

## 2023-03-16 RX ADMIN — BUDESONIDE AND FORMOTEROL FUMARATE DIHYDRATE 1 PUFF: 160; 4.5 AEROSOL RESPIRATORY (INHALATION) at 20:44

## 2023-03-16 RX ADMIN — Medication 10 ML: at 20:35

## 2023-03-16 RX ADMIN — TRAZODONE HYDROCHLORIDE 100 MG: 100 TABLET ORAL at 20:28

## 2023-03-16 RX ADMIN — WARFARIN SODIUM 4 MG: 4 TABLET ORAL at 20:28

## 2023-03-16 RX ADMIN — SODIUM CHLORIDE 125 ML/HR: 9 INJECTION, SOLUTION INTRAVENOUS at 17:23

## 2023-03-16 RX ADMIN — ALPRAZOLAM 0.25 MG: 0.25 TABLET ORAL at 20:28

## 2023-03-16 NOTE — NURSING NOTE
Report taken from SHARI Feliciano. Assumed care for patient. Patient is watching television, no needs at this time. Care on going.
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
## 2023-03-16 NOTE — CASE MANAGEMENT/SOCIAL WORK
Discharge Planning Assessment   [facility]     Patient Name: Altagracia Tiwari  MRN: [id number]  Today's Date: 3/16/2023    Admit Date: 3/16/2023    Plan: Home, Current OP Cardiac Rehab at [facility]   Discharge Needs Assessment     Row Name 03/16/23 1342       Living Environment    People in Home alone    Unique Family Situation Patient Lives alone in [place] however is staying with Mother in area    Current Living Arrangements home    Primary Care Provided by self    Provides Primary Care For no one    Able to Return to Prior Arrangements yes       Resource/Environmental Concerns    Resource/Environmental Concerns none    Transportation Concerns none       Food Insecurity    Within the past 12 months, you worried that your food would run out before you got the money to buy more. Never true    Within the past 12 months, the food you bought just didn't last and you didn't have money to get more. Never true       Transition Planning    Patient/Family Anticipates Transition to home    Patient/Family Anticipated Services at Transition none    Transportation Anticipated family or friend will provide       Discharge Needs Assessment    Readmission Within the Last 30 Days no previous admission in last 30 days    Concerns to be Addressed denies needs/concerns at this time    Anticipated Changes Related to Illness none    Equipment Needed After Discharge none               Discharge Plan     Row Name 03/16/23 4978       Plan    Plan Home, Current OP Cardiac Rehab at [facility]    Patient/Family in Agreement with Plan yes    Plan Comments Met with Patient and Mother at bedside. Lives at home alone however has been staying with mother Locally post op and completing OP Cardiac Rehab at [facility] (she is from [facility]). PCP and Pharmacy verified, able to afford medications. Dr Burns Manages Coumadin D/C Barriers: Orthostatic Hypotension, IV FLuids, CVT Surgery consult              Continued Care and Services - Admitted Since 3/16/2023     Coordination has not been started for this encounter.          Demographic Summary     Row Name 03/16/23 1342       General Information    Admission Type other (see comments)  Pending admission status    Arrived From emergency department    Referral Source admission list    Reason for Consult discharge planning    Preferred Language English               Functional Status     Row Name 03/16/23 1342       Functional Status    Usual Activity Tolerance good    Current Activity Tolerance good       Functional Status, IADL    Medications independent    Meal Preparation independent    Housekeeping independent    Laundry independent    Shopping independent       Mental Status    General Appearance WDL WDL       Mental Status Summary    Recent Changes in Mental Status/Cognitive Functioning no changes              Met with patient at bedside wearing mask and goggles, Spent less than 15 minutes in room at greater than 6 feet distance.       Juliana Portillo RN
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
## 2023-03-16 NOTE — PLAN OF CARE
Goal Outcome Evaluation:  Plan of Care Reviewed With: patient        Progress: no change  Outcome Evaluation: Patient admitted for orthostatic hypotension. Patient recently started on lopressor by cardiology. Cardiology following at this time. Patient had CABG and MVR on 02/20/23 by Dr. Head. No complaints at this time. Will continue to monitor.

## 2023-03-16 NOTE — PROGRESS NOTES
" LOS: 0 days   Patient Care Team:  Ani Sullivan MD as PCP - General (Internal Medicine)  Alberto Hearn MD as Consulting Physician (Hematology and Oncology)  Barrie Burns MD as Consulting Physician (Cardiology)  Anai Ceja APRN as Nurse Practitioner (Cardiology)    Chief Complaint: Dizziness    Subjective    Pt is a 46 yo female well known to our service as she recently had mitral valve replacement with mechanical valve and CABG x 1 with Dr. Head on 2/20/2023. The patient was discharged on 3/1/2023 on coumadin, metoprolol, bumex, aspirin, and statin. Pt was at [facility] cardiac rehab this morning when she began feeling dizzy and that she may pass out. Pt denies a fall during this episode. After failing orthostatic vitals, the pt was advised to go the ED for further evaluation. She has been admitted for observation after stabilizing from her episode. Pt states that prior to her surgery in February she has experienced dizziness with associated ear fullness and describes it as \"a swimming type of feeling\", which has historically resolved with rest. Today's episode felt similar per the pt, though otic symptoms were notably less severe.     Vital Signs  Temp:  [98.4 °F (36.9 °C)] 98.4 °F (36.9 °C)  Heart Rate:  [68-89] 86  Resp:  [17] 17  BP: ()/(60-88) 90/60  Body mass index is 41.6 kg/m².    Intake/Output Summary (Last 24 hours) at 3/16/2023 1451  Last data filed at 3/16/2023 1303  Gross per 24 hour   Intake 2000 ml   Output --   Net 2000 ml     I/O this shift:  In: 2000 [IV Piggyback:2000]  Out: -             03/16/23  1038   Weight: 113 kg (250 lb)         Objective    Results Review:        WBC WBC   Date Value Ref Range Status   03/16/2023 6.60 3.40 - 10.80 10*3/mm3 Final      HGB Hemoglobin   Date Value Ref Range Status   03/16/2023 12.3 12.0 - 15.9 g/dL Final      HCT Hematocrit   Date Value Ref Range Status   03/16/2023 38.1 34.0 - 46.6 % Final      Platelets Platelets   Date Value Ref Range " Status   03/16/2023 444 140 - 450 10*3/mm3 Final        PT/INR:    Protime   Date Value Ref Range Status   03/16/2023 25.2 (H) 9.6 - 11.7 Seconds Final   /  INR   Date Value Ref Range Status   03/16/2023 2.58 (C) 0.93 - 1.10 Final       Sodium Sodium   Date Value Ref Range Status   03/16/2023 137 136 - 145 mmol/L Final      Potassium Potassium   Date Value Ref Range Status   03/16/2023 4.2 3.5 - 5.2 mmol/L Final      Chloride Chloride   Date Value Ref Range Status   03/16/2023 98 98 - 107 mmol/L Final      Bicarbonate CO2   Date Value Ref Range Status   03/16/2023 28.0 22.0 - 29.0 mmol/L Final      BUN BUN   Date Value Ref Range Status   03/16/2023 11 6 - 20 mg/dL Final      Creatinine Creatinine   Date Value Ref Range Status   03/16/2023 0.96 0.57 - 1.00 mg/dL Final      Calcium Calcium   Date Value Ref Range Status   03/16/2023 10.2 8.6 - 10.5 mg/dL Final      Magnesium Magnesium   Date Value Ref Range Status   03/16/2023 2.3 1.6 - 2.6 mg/dL Final                      Patient Active Problem List   Diagnosis Code   • Asthma J45.909   • Depression with anxiety F41.8   • Gastroesophageal reflux disease K21.9   • Hypothyroidism E03.9   • Irritable bowel syndrome K58.9   • Migraine G43.909   • Obesity due to excess calories E66.09   • Tremor R25.1   • Pain of right heel M79.671   • Injury of peroneal tendon of right foot S86.301A   • 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 Head 2/20/2023 Z95.2   • S/P CABG x 1 with JAY per Dr. Head 2/20/2023 Z95.1   • PFO (patent foramen ovale) 2/20/2023 Q21.12   •  Orthostatic hypotension I95.1       Assessment & Plan     - Mitral valve regurgitation s/p MVR (mechanical) with CABG x 1 (SVG) - [place] 2/20/2023 - on coumadin (INR - 2.58)   - Near - syncopal episode  - Hypothyroid disorder - on levothyroxine   - Depressive disorder  -  Obesity (BMI 41.6)    Spoke with pt today, symptoms have resolved in observation, currently hemodynamically stable. Pt denies any symptoms of bleeding, chest pain, shortness of breath, or any other syncopal episodes at home. Pt was prescribed bumex for 14 days (BID x 7 days, QD x 7 days) postoperatively, pt states she still has bumex left at home. Pt advised to discontinue. Plan to keep pt in observation overnight and discharge home tomorrow if she remains stable. Await further recommendations from Dr. Samuels. Will continue to monitor.     Joni Chavez PA-C  03/16/23  14:51 EDT
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
## 2023-03-16 NOTE — TELEPHONE ENCOUNTER
Patient presented to [facility] for PT and was c/o symptoms.  Physicial therapist was monitoring her VS and her last BP was 72/59 HR 81.  Patient c/o dizziness.  They are sending her to [facility].

## 2023-03-17 VITALS
DIASTOLIC BLOOD PRESSURE: 81 MMHG | TEMPERATURE: 98.1 F | HEART RATE: 91 BPM | WEIGHT: 250 LBS | HEIGHT: 65 IN | RESPIRATION RATE: 17 BRPM | BODY MASS INDEX: 41.65 KG/M2 | SYSTOLIC BLOOD PRESSURE: 133 MMHG | OXYGEN SATURATION: 92 %

## 2023-03-17 LAB
INR PPP: 2.69 (ref 2–3)
PROTHROMBIN TIME: 26.2 SECONDS (ref 19.4–28.5)
QT INTERVAL: 370 MS

## 2023-03-17 PROCEDURE — 94799 UNLISTED PULMONARY SVC/PX: CPT

## 2023-03-17 PROCEDURE — 85610 PROTHROMBIN TIME: CPT | Performed by: INTERNAL MEDICINE

## 2023-03-17 PROCEDURE — 96361 HYDRATE IV INFUSION ADD-ON: CPT

## 2023-03-17 PROCEDURE — G0378 HOSPITAL OBSERVATION PER HR: HCPCS

## 2023-03-17 RX ADMIN — PANTOPRAZOLE SODIUM 40 MG: 40 TABLET, DELAYED RELEASE ORAL at 06:16

## 2023-03-17 RX ADMIN — SUCRALFATE 1 G: 1 TABLET ORAL at 08:00

## 2023-03-17 RX ADMIN — ASPIRIN 81 MG: 81 TABLET, COATED ORAL at 08:00

## 2023-03-17 RX ADMIN — SODIUM CHLORIDE 125 ML/HR: 9 INJECTION, SOLUTION INTRAVENOUS at 03:12

## 2023-03-17 RX ADMIN — LEVOTHYROXINE SODIUM 150 MCG: 0.15 TABLET ORAL at 06:16

## 2023-03-17 RX ADMIN — VENLAFAXINE HYDROCHLORIDE 150 MG: 75 CAPSULE, EXTENDED RELEASE ORAL at 08:00

## 2023-03-17 RX ADMIN — Medication 12.5 MG: at 08:00

## 2023-03-17 RX ADMIN — ACETAMINOPHEN 500 MG: 500 TABLET ORAL at 03:46

## 2023-03-17 RX ADMIN — ALPRAZOLAM 0.25 MG: 0.25 TABLET ORAL at 08:00

## 2023-03-17 RX ADMIN — BUDESONIDE AND FORMOTEROL FUMARATE DIHYDRATE 1 PUFF: 160; 4.5 AEROSOL RESPIRATORY (INHALATION) at 08:10

## 2023-03-17 NOTE — DISCHARGE SUMMARY
Date of Discharge:  3/17/2023    Discharge Diagnosis:   **Orthostatic hypotension [I95.1]   S/P mechanical MVR per Dr. Head 2/20/2023 [Z95.2]   S/P CABG x 1 with JAY per Dr. Head 2/20/2023 [Z95.1]   Coronary artery disease of native artery of native heart with stable angina pectoris (HCC) [I25.118]   Depression with anxiety [F41.8]   Gastroesophageal reflux disease [K21.9]   Hypothyroidism [E03.9]       Presenting Problem/History of Present Illness  Active Hospital Problems    Diagnosis  POA   • **Orthostatic hypotension [I95.1]  Yes   • S/P mechanical MVR per Dr. Head 2/20/2023 [Z95.2]  Not Applicable   • S/P CABG x 1 with JAY per Dr. Head 2/20/2023 [Z95.1]  Not Applicable   • Coronary artery disease of native artery of native heart with stable angina pectoris (HCC) [I25.118]  Yes   • Depression with anxiety [F41.8]  Yes   • Gastroesophageal reflux disease [K21.9]  Yes   • Hypothyroidism [E03.9]  Yes      Resolved Hospital Problems   No resolved problems to display.          Hospital Course  Patient is a 47 y.o. female presented with lightheadedness with documented orthostatic hypotension that developed cardiac rehab.  She has been compliant with medications, including Bumex.  She was treated with IV fluids and discontinuation of Bumex and potassium.  Symptoms resolved overnight and this morning she is no longer orthostatic.  She feels well and has no specific complaints.  Physical exam is unremarkable.  She has follow-up arranged next week with cardiology, cardiac surgery and myself.  She has an order for monitoring of PT/INR at [facility] with results relayed to Dr. Burns.    Thank you    Procedures Performed         Consults:   Consults     Date and Time Order Name Status Description    3/16/2023  3:46 PM Inpatient Cardiology Consult      3/16/2023 12:18 PM Surgery (on-call MD unless specified)      2/11/2023  2:47 PM Hematology & Oncology Inpatient Consult Completed     2/11/2023  2:23 PM  Inpatient Cardiology Consult Completed           Pertinent Test Results:    Lab Results (most recent)     Procedure Component Value Units Date/Time    Protime-INR [[id number]]  (Normal) Collected: 03/17/23 0724    Specimen: Blood Updated: 03/17/23 0752     Protime 26.2 Seconds      INR 2.69    Protime-INR [[id number]]  (Normal) Collected: 03/16/23 1932    Specimen: Blood Updated: 03/16/23 2110     Protime 24.0 Seconds      INR 2.45    High Sensitivity Troponin T 2Hr [[id number]]  (Abnormal) Collected: 03/16/23 1337    Specimen: Blood Updated: 03/16/23 1421     HS Troponin T 40 ng/L      Troponin T Delta 1 ng/L     Narrative:      High Sensitive Troponin T Reference Range:  <10.0 ng/L- Negative Female for AMI  <15.0 ng/L- Negative Male for AMI  >=10 - Abnormal Female indicating possible myocardial injury.  >=15 - Abnormal Male indicating possible myocardial injury.   Clinicians would have to utilize clinical acumen, EKG, Troponin, and serial changes to determine if it is an Acute Myocardial Infarction or myocardial injury due to an underlying chronic condition.         Comprehensive Metabolic Panel [[id number]]  (Abnormal) Collected: 03/16/23 1123    Specimen: Blood Updated: 03/16/23 1202     Glucose 99 mg/dL      BUN 11 mg/dL      Creatinine 0.96 mg/dL      Sodium 137 mmol/L      Potassium 4.2 mmol/L      Chloride 98 mmol/L      CO2 28.0 mmol/L      Calcium 10.2 mg/dL      Total Protein 8.5 g/dL      Albumin 4.1 g/dL      ALT (SGPT) 15 U/L      AST (SGOT) 25 U/L      Alkaline Phosphatase 162 U/L      Total Bilirubin 0.7 mg/dL      Globulin 4.4 gm/dL      A/G Ratio 0.9 g/dL      BUN/Creatinine Ratio 11.5     Anion Gap 11.0 mmol/L      eGFR 72.7 mL/min/1.73     Narrative:      GFR Normal >60  Chronic Kidney Disease <60  Kidney Failure <15      Magnesium [[id number]]  (Normal) Collected: 03/16/23 1123    Specimen: Blood Updated: 03/16/23 1202     Magnesium 2.3 mg/dL     High Sensitivity Troponin T [[id number]]  (Abnormal)  Collected: 03/16/23 1123    Specimen: Blood Updated: 03/16/23 1202     HS Troponin T 39 ng/L     Narrative:      High Sensitive Troponin T Reference Range:  <10.0 ng/L- Negative Female for AMI  <15.0 ng/L- Negative Male for AMI  >=10 - Abnormal Female indicating possible myocardial injury.  >=15 - Abnormal Male indicating possible myocardial injury.   Clinicians would have to utilize clinical acumen, EKG, Troponin, and serial changes to determine if it is an Acute Myocardial Infarction or myocardial injury due to an underlying chronic condition.         CBC & Differential [[id number]]  (Abnormal) Collected: 03/16/23 1123    Specimen: Blood Updated: 03/16/23 1157    Narrative:      The following orders were created for panel order CBC & Differential.  Procedure                               Abnormality         Status                     ---------                               -----------         ------                     CBC Auto Differential[[id number]]        Abnormal            Final result               Scan Slide[[id number]]                                       Final result                 Please view results for these tests on the individual orders.    Scan Slide [[id number]] Collected: 03/16/23 1123    Specimen: Blood Updated: 03/16/23 1157     Anisocytosis Slight/1+     Elliptocytes Slight/1+     Poikilocytes Slight/1+     WBC Morphology Normal     Platelet Morphology Normal    Narrative:      Slide Reviewed      CBC Auto Differential [[id number]]  (Abnormal) Collected: 03/16/23 1123    Specimen: Blood Updated: 03/16/23 1157     WBC 6.60 10*3/mm3      RBC 5.05 10*6/mm3      Hemoglobin 12.3 g/dL      Hematocrit 38.1 %      MCV 75.5 fL      MCH 24.3 pg      MCHC 32.2 g/dL      RDW 32.8 %      RDW-SD 84.0 fl      MPV 8.2 fL      Platelets 444 10*3/mm3      Neutrophil % 76.6 %      Lymphocyte % 15.3 %      Monocyte % 5.4 %      Eosinophil % 2.0 %      Basophil % 0.7 %      Neutrophils, Absolute 5.00 10*3/mm3       Lymphocytes, Absolute 1.00 10*3/mm3      Monocytes, Absolute 0.40 10*3/mm3      Eosinophils, Absolute 0.10 10*3/mm3      Basophils, Absolute 0.00 10*3/mm3      nRBC 0.1 /100 WBC     Narrative:      Appended report. These results have been appended to a previously verified report.    aPTT [[id number]]  (Abnormal) Collected: 03/16/23 1123    Specimen: Blood Updated: 03/16/23 1148     PTT 53.2 seconds            Results for orders placed during the hospital encounter of 02/11/23    Adult Transthoracic Echo Limited W/ Cont if Necessary Per Protocol    Interpretation Summary  •  Left ventricular ejection fraction appears to be 56 - 60%.  •  There is a prosthetic mitral valve present.  •  Technically difficult study with limited views  •  No pericardial effusion noted              Condition on Discharge: Stable  Vital Signs  Temp:  [97.5 °F (36.4 °C)-98.4 °F (36.9 °C)] 98.1 °F (36.7 °C)  Heart Rate:  [68-91] 91  Resp:  [16-20] 17  BP: ()/(60-88) 133/81    Physical Exam:     General Appearance:    Alert, cooperative, in no acute distress   Head:    Normocephalic, without obvious abnormality, atraumatic   Eyes:            Lids and lashes normal, conjunctivae and sclerae normal, no   icterus, no pallor, corneas clear, PERRLA   Ears:    Ears appear intact with no abnormalities noted   Throat:   No oral lesions, no thrush, oral mucosa moist   Neck:   No adenopathy, supple, trachea midline, no thyromegaly, no   carotid bruit, no JVD   Lungs:     Clear to auscultation,respirations regular, even and                  unlabored    Heart:    Regular rhythm and normal rate, normal S1 and S2, no            murmur, no gallop, no rub, no click   Chest Wall:    No abnormalities observed   Abdomen:     Normal bowel sounds, no masses, no organomegaly, soft        non-tender, non-distended, no guarding, no rebound                tenderness   Extremities:   Moves all extremities well, no edema, no cyanosis, no             redness    Pulses:   Pulses palpable and equal bilaterally   Skin:   No bleeding, bruising or rash   Lymph nodes:   No palpable adenopathy   Neurologic:   Cranial nerves 2 - 12 grossly intact, sensation intact, DTR       present and equal bilaterally       Discharge Disposition  Home or Self Care    Discharge Medications     Discharge Medications      Continue These Medications      Instructions Start Date   acetaminophen 500 MG tablet  Commonly known as: TYLENOL   500 mg, Oral, Every 6 Hours PRN      Advair Diskus 250-50 MCG/ACT DISKUS  Generic drug: Fluticasone-Salmeterol   INHALE 1 PUFF BY MOUTH TWICE DAILY      ALPRAZolam 0.25 MG tablet  Commonly known as: XANAX   0.25 mg, Oral, 2 Times Daily      aspirin 81 MG EC tablet   81 mg, Oral, Daily      atorvastatin 40 MG tablet  Commonly known as: LIPITOR   40 mg, Oral, Nightly      cyclobenzaprine 10 MG tablet  Commonly known as: FLEXERIL   10 mg, Oral, 3 Times Daily PRN      esomeprazole 40 MG capsule  Commonly known as: nexIUM   40 mg, Oral, Every Morning Before Breakfast      lamoTRIgine 200 MG tablet  Commonly known as: LaMICtal   200 mg, Oral, Daily      levothyroxine 150 MCG tablet  Commonly known as: SYNTHROID, LEVOTHROID   150 mcg, Oral, Daily      metoprolol tartrate 25 MG tablet  Commonly known as: LOPRESSOR   12.5 mg, Oral, Every 12 Hours Scheduled      sucralfate 1 g tablet  Commonly known as: CARAFATE   1 g, Oral, 4 Times Daily Before Meals & Nightly      traZODone 100 MG tablet  Commonly known as: DESYREL   100 mg, Oral, Nightly      venlafaxine  MG 24 hr capsule  Commonly known as: EFFEXOR-XR   150 mg, Oral, Daily      Ventolin  (90 Base) MCG/ACT inhaler  Generic drug: albuterol sulfate HFA   INHALE 1 TO 2 PUFFS BY MOUTH EVERY 6 HOURS AS NEEDED      albuterol 0.63 MG/3ML nebulizer solution  Commonly known as: ACCUNEB   0.63 mg, Nebulization, Every 6 Hours PRN      warfarin 4 MG tablet  Commonly known as: Coumadin   Take 4 mg daily or as directed by  Dr. Burns.  For mechanical heart valve.         Stop These Medications    bumetanide 1 MG tablet  Commonly known as: BUMEX     nystatin 548989 UNIT/GM powder  Commonly known as: MYCOSTATIN     potassium chloride 10 MEQ CR capsule  Commonly known as: MICRO-K            Discharge Diet:     Activity at Discharge:     Follow-up Appointments  Future Appointments   Date Time Provider Department Center   3/22/2023  9:00 AM [facility]   3/22/2023  9:00 AM Anai Ceja APRN [facility]   3/23/2023 12:45 PM Tahmina Patel APRN [facility]     Additional Instructions for the Follow-ups that You Need to Schedule     Discharge Follow-up with PCP   As directed       Currently Documented PCP:    Ani Sullivan MD    PCP Phone Number:    [phone]     Follow Up Details: Keep all follow up appointments as scheduled.               Test Results Pending at Discharge       Ani Sullivan MD  03/17/23  09:22 EDT    Time: Discharge 20min

## 2023-03-17 NOTE — PLAN OF CARE
Problem: Adult Inpatient Plan of Care  Goal: Plan of Care Review  Outcome: Ongoing, Progressing  Goal: Patient-Specific Goal (Individualized)  Outcome: Ongoing, Progressing  Goal: Absence of Hospital-Acquired Illness or Injury  Outcome: Ongoing, Progressing  Intervention: Identify and Manage Fall Risk  Recent Flowsheet Documentation  Taken 3/17/2023 0415 by Radha Barnhart RN  Safety Promotion/Fall Prevention:   assistive device/personal items within reach   clutter free environment maintained   lighting adjusted   nonskid shoes/slippers when out of bed   room organization consistent   safety round/check completed  Taken 3/17/2023 0200 by Radha Barnhart RN  Safety Promotion/Fall Prevention:   assistive device/personal items within reach   clutter free environment maintained   lighting adjusted   nonskid shoes/slippers when out of bed   room organization consistent   safety round/check completed  Taken 3/17/2023 0000 by Radha Barnhart RN  Safety Promotion/Fall Prevention:   assistive device/personal items within reach   clutter free environment maintained   lighting adjusted   nonskid shoes/slippers when out of bed   room organization consistent   safety round/check completed  Taken 3/16/2023 2200 by Radha Barnhart RN  Safety Promotion/Fall Prevention:   assistive device/personal items within reach   clutter free environment maintained   lighting adjusted   nonskid shoes/slippers when out of bed   room organization consistent   safety round/check completed  Taken 3/16/2023 2028 by Radha Barnhart RN  Safety Promotion/Fall Prevention:   assistive device/personal items within reach   clutter free environment maintained   lighting adjusted   nonskid shoes/slippers when out of bed   safety round/check completed   room organization consistent  Intervention: Prevent Skin Injury  Recent Flowsheet Documentation  Taken 3/16/2023 2028 by Radha Barnhart RN  Body Position: position changed independently  Skin Protection:    adhesive use limited   transparent dressing maintained   tubing/devices free from skin contact  Intervention: Prevent and Manage VTE (Venous Thromboembolism) Risk  Recent Flowsheet Documentation  Taken 3/16/2023 2028 by Radha Barnhart RN  Activity Management:   activity adjusted per tolerance   up ad shanae  Range of Motion: active ROM (range of motion) encouraged  Intervention: Prevent Infection  Recent Flowsheet Documentation  Taken 3/17/2023 0415 by Radha Barnhart RN  Infection Prevention:   environmental surveillance performed   hand hygiene promoted   personal protective equipment utilized   rest/sleep promoted   single patient room provided  Taken 3/17/2023 0200 by Radha Barnhart RN  Infection Prevention:   environmental surveillance performed   hand hygiene promoted   personal protective equipment utilized   rest/sleep promoted   single patient room provided  Taken 3/17/2023 0000 by Radha Barnhart RN  Infection Prevention:   environmental surveillance performed   hand hygiene promoted   personal protective equipment utilized   rest/sleep promoted   single patient room provided  Taken 3/16/2023 2200 by Radha Barnhart RN  Infection Prevention:   environmental surveillance performed   hand hygiene promoted   personal protective equipment utilized   rest/sleep promoted   single patient room provided  Taken 3/16/2023 2028 by Radha Barnhart RN  Infection Prevention:   environmental surveillance performed   hand hygiene promoted   personal protective equipment utilized   single patient room provided   rest/sleep promoted  Goal: Optimal Comfort and Wellbeing  Outcome: Ongoing, Progressing  Intervention: Provide Person-Centered Care  Recent Flowsheet Documentation  Taken 3/16/2023 2028 by Radha Barnhart RN  Trust Relationship/Rapport: care explained  Goal: Readiness for Transition of Care  Outcome: Ongoing, Progressing     Problem: Heart Failure Comorbidity  Goal: Maintenance of Heart Failure Symptom  Control  Outcome: Ongoing, Progressing  Intervention: Maintain Heart Failure-Management  Recent Flowsheet Documentation  Taken 3/16/2023 2028 by Radha Barnhart RN  Medication Review/Management: medications reviewed     Problem: Hypertension Comorbidity  Goal: Blood Pressure in Desired Range  Outcome: Ongoing, Progressing  Intervention: Maintain Blood Pressure Management  Recent Flowsheet Documentation  Taken 3/16/2023 2028 by Radha Barnhart RN  Medication Review/Management: medications reviewed   Goal Outcome Evaluation:   A&O x4. Tolerating room air. NS infusing at 125mL/hr. Complained on 6/10 back pain, pain medication given. See MAR. Up ad shanae to bathroom. Denies any other pain or discomfort. Rested comfortably throughout shift. Care on going.

## 2023-03-17 NOTE — CASE MANAGEMENT/SOCIAL WORK
Case Management Discharge Note      Final Note: Home         Selected Continued Care - Discharged on 3/17/2023 Admission date: 3/16/2023 - Discharge disposition: Home or Self Care            Transportation Services  Private: Car    Final Discharge Disposition Code: 01 - home or self-care

## 2023-03-17 NOTE — OUTREACH NOTE
CT Surgery Week 2 Survey    Flowsheet Row Responses   [facility] facility patient discharged from? [facility]   Does the patient have one of the following disease processes/diagnoses(primary or secondary)? Cardiothoracic surgery   Week 2 attempt successful? No   Unsuccessful attempts Attempt 1   Revoke Readmitted          Shasha TAI - Registered Nurse

## 2023-03-20 ENCOUNTER — OFFICE (OUTPATIENT)
Dept: URBAN - METROPOLITAN AREA CLINIC 64 | Facility: CLINIC | Age: 48
End: 2023-03-20
Payer: COMMERCIAL

## 2023-03-20 DIAGNOSIS — K31.89 OTHER DISEASES OF STOMACH AND DUODENUM: ICD-10-CM

## 2023-03-20 DIAGNOSIS — Z98.890 OTHER SPECIFIED POSTPROCEDURAL STATES: ICD-10-CM

## 2023-03-20 DIAGNOSIS — D50.9 IRON DEFICIENCY ANEMIA, UNSPECIFIED: ICD-10-CM

## 2023-03-20 PROCEDURE — 91110 GI TRC IMG INTRAL ESOPH-ILE: CPT | Performed by: INTERNAL MEDICINE
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
## 2023-03-21 ENCOUNTER — TELEPHONE (OUTPATIENT)
Dept: CARDIOLOGY | Facility: CLINIC | Age: 48
End: 2023-03-21

## 2023-03-22 ENCOUNTER — ANTICOAGULATION VISIT (OUTPATIENT)
Dept: CARDIOLOGY | Facility: CLINIC | Age: 48
End: 2023-03-22
Payer: COMMERCIAL

## 2023-03-22 ENCOUNTER — TELEPHONE (OUTPATIENT)
Dept: CARDIOLOGY | Facility: CLINIC | Age: 48
End: 2023-03-22

## 2023-03-22 ENCOUNTER — OFFICE VISIT (OUTPATIENT)
Dept: CARDIOLOGY | Facility: CLINIC | Age: 48
End: 2023-03-22
Payer: COMMERCIAL

## 2023-03-22 VITALS
HEIGHT: 65 IN | WEIGHT: 238 LBS | HEART RATE: 87 BPM | OXYGEN SATURATION: 96 % | SYSTOLIC BLOOD PRESSURE: 97 MMHG | DIASTOLIC BLOOD PRESSURE: 68 MMHG | BODY MASS INDEX: 39.65 KG/M2

## 2023-03-22 DIAGNOSIS — I25.118 CORONARY ARTERY DISEASE OF NATIVE ARTERY OF NATIVE HEART WITH STABLE ANGINA PECTORIS: ICD-10-CM

## 2023-03-22 DIAGNOSIS — E66.01 CLASS 3 SEVERE OBESITY DUE TO EXCESS CALORIES WITH SERIOUS COMORBIDITY AND BODY MASS INDEX (BMI) OF 40.0 TO 44.9 IN ADULT: ICD-10-CM

## 2023-03-22 DIAGNOSIS — Z95.1 S/P CABG X 1: ICD-10-CM

## 2023-03-22 DIAGNOSIS — I34.0 NONRHEUMATIC MITRAL VALVE REGURGITATION: ICD-10-CM

## 2023-03-22 DIAGNOSIS — Z09 HOSPITAL DISCHARGE FOLLOW-UP: Primary | ICD-10-CM

## 2023-03-22 DIAGNOSIS — Z95.2 S/P MVR (MITRAL VALVE REPLACEMENT): Primary | ICD-10-CM

## 2023-03-22 DIAGNOSIS — Z95.2 S/P MVR (MITRAL VALVE REPLACEMENT): ICD-10-CM

## 2023-03-22 DIAGNOSIS — I50.9 CONGESTIVE HEART FAILURE, UNSPECIFIED HF CHRONICITY, UNSPECIFIED HEART FAILURE TYPE: ICD-10-CM

## 2023-03-22 DIAGNOSIS — I95.1 ORTHOSTATIC HYPOTENSION: ICD-10-CM

## 2023-03-22 LAB — INR PPP: 3.7 (ref 0.9–1.1)

## 2023-03-22 PROCEDURE — 99214 OFFICE O/P EST MOD 30 MIN: CPT

## 2023-03-22 PROCEDURE — 85610 PROTHROMBIN TIME: CPT | Performed by: INTERNAL MEDICINE

## 2023-03-22 PROCEDURE — 36416 COLLJ CAPILLARY BLOOD SPEC: CPT | Performed by: INTERNAL MEDICINE

## 2023-03-22 RX ORDER — METOPROLOL SUCCINATE 25 MG/1
12.5 TABLET, EXTENDED RELEASE ORAL DAILY
Qty: 90 TABLET | Refills: 3 | Status: SHIPPED | OUTPATIENT
Start: 2023-03-22

## 2023-03-22 NOTE — TELEPHONE ENCOUNTER
Called spoke with Vanessa at Dr. Sullivan's office they can check an INR on Monday for the patient. They will fax result to our office apLPN

## 2023-03-23 ENCOUNTER — OFFICE VISIT (OUTPATIENT)
Dept: CARDIAC SURGERY | Facility: CLINIC | Age: 48
End: 2023-03-23
Payer: COMMERCIAL

## 2023-03-23 VITALS
TEMPERATURE: 97.3 F | SYSTOLIC BLOOD PRESSURE: 113 MMHG | HEIGHT: 68 IN | RESPIRATION RATE: 20 BRPM | DIASTOLIC BLOOD PRESSURE: 81 MMHG | HEART RATE: 98 BPM | WEIGHT: 230 LBS | OXYGEN SATURATION: 98 % | BODY MASS INDEX: 34.86 KG/M2

## 2023-03-23 DIAGNOSIS — Z95.1 S/P CABG X 1: Primary | ICD-10-CM

## 2023-03-23 DIAGNOSIS — Z95.2 S/P MVR (MITRAL VALVE REPLACEMENT): ICD-10-CM

## 2023-03-23 PROCEDURE — 99024 POSTOP FOLLOW-UP VISIT: CPT | Performed by: NURSE PRACTITIONER

## 2023-03-23 RX ORDER — CYCLOBENZAPRINE HCL 10 MG
10 TABLET ORAL 3 TIMES DAILY PRN
Qty: 30 TABLET | Refills: 0 | Status: SHIPPED | OUTPATIENT
Start: 2023-03-23

## 2023-03-24 NOTE — PROGRESS NOTES
CARDIOVASCULAR SURGERY FOLLOW-UP PROGRESS NOTE  Chief Complaint: post op        HPI:   Dear Ani Perez MD and colleagues:    It was nice to see Altagracia Tiwari in follow up 4 weeks after surgery.  As you know, she is a 48 y.o. female with a history of mitral valve regurgitation, mild to moderate tricuspid valve regurgitation, CAD, HFpEF, chronic anemia, Sjogren's syndrome, psoriatic arthritis, obesity with history of gastric sleeve, hypothyroidism, asthma, essential tremor s/p deep brain stimulator, GERD, depression/anxiety, IBS, RML lung nodule who underwent CABG x1 with LIMA to mid LAD, mechanical MVR, PFO closure, and bone marrow biopsy with Dr. Head on 2/20/23 (see op note for full report). Pathology of bone marrow biopsy revealed no significant immunophenotypic abnormalities or myeloid and lymphoid cells.  She did well postoperatively and continues to do well. She was Coumadin loaded for her mechanical valve and discharged home on POD#9. She has been followed with [facility] physical therapy as an outpatient. She had been having episodes of dizziness/orthostasis while at physical therapy, and presented to the ER on 3/16/23. She was given IV fluids and her diuretics were discontinued. There was improvement in her blood pressure, but it still remained borderline. She followed up with Dr. Burns's office yesterday. Her Lopressor was switched to low dose Toprol XL due to persistent borderline blood pressure. She is to keep a log and follow up with Dr. Burns if blood pressures do not improve. Of note her blood pressure is excellent today in the office.  She comes in today complaining of bilateral chest wall pain, that she feels is improving. Flexeril has helped her musculoskeletal pain. She denies shortness of breath, lower extremity edema, or further dizziness,  Her activity level has been good. She continues with outpatient PT although she has missed a few sessions due to her blood pressure. From a surgical  "standpoint, the sternal incision is well approximated without erythema, edema, or drainage.  The sternum is stable to palpation, and the patient denies any popping or clicking with deep inspiration or coughing. INR management per cardiology. She reports that it was slightly elevated and her dosage is being adjusted. She has plans to return to [place] soon to work on selling her home, INR to be drawn at [facility] and called to Dr. Burns's office for adjustments.     Physical Exam:         /81 (BP Location: Left arm, Patient Position: Sitting, Cuff Size: Adult)   Pulse 98   Temp 97.3 °F (36.3 °C)   Resp 20   Ht 172.7 cm (68\")   Wt 104 kg (230 lb)   SpO2 98%   BMI 34.97 kg/m²   Heart:  regular rate and rhythm, S1, S2 normal, no murmur, click, rub or gallop. Crisp valve click  Lungs:  clear to auscultation bilaterally  Extremities:  no edema  Incision(s):  mid chest healing well, no significant drainage, no dehiscence, and no significant erythema. sternum stable    Assessment/Plan:     S/P CABG, MVR (mechanical), and PFO closure. Overall, she is doing well.    No significant post-op complications  She is requesting a refill on her Flexeril. Will send in to pharmacy  She was previously on Cimzia for her psoriatic arthritis. Will discuss with Dr. Head regarding timing of resumption.    No heavy lifting > 10 pounds for 2 more weeks  Keep incisions clean and dry  OK to drive if not taking narcotic pain medicine  OK to begin cardiac rehab from surgical standpoint  Follow-up as scheduled with cardiology  Follow-up as scheduled with PCP  Follow-up with CT surgery prn    Continue lifting restriction of 10 lbs until 6 weeks and 50 lbs until 12 weeks from the date of surgery, no excessive jarring motions or twisting motions until 12 weeks from the date of surgery    Return to clinic if any signs or symptoms of infection or sternal instability develop     Thank you for allowing me to participate in the care of your " patient.    Regards,  Violette Johnson, APRN

## 2023-03-27 ENCOUNTER — ANTICOAGULATION VISIT (OUTPATIENT)
Dept: CARDIOLOGY | Facility: CLINIC | Age: 48
End: 2023-03-27
Payer: COMMERCIAL

## 2023-03-27 DIAGNOSIS — Z95.2 S/P MVR (MITRAL VALVE REPLACEMENT): Primary | ICD-10-CM

## 2023-03-27 LAB — INR PPP: 2.4

## 2023-03-28 ENCOUNTER — TRANSCRIBE ORDERS (OUTPATIENT)
Dept: ADMINISTRATIVE | Facility: HOSPITAL | Age: 48
End: 2023-03-28
Payer: COMMERCIAL

## 2023-03-28 DIAGNOSIS — R91.1 NODULE OF MIDDLE LOBE OF RIGHT LUNG: Primary | ICD-10-CM

## 2023-03-31 ENCOUNTER — LAB (OUTPATIENT)
Dept: LAB | Facility: HOSPITAL | Age: 48
End: 2023-03-31
Payer: COMMERCIAL

## 2023-03-31 ENCOUNTER — ANTICOAGULATION VISIT (OUTPATIENT)
Dept: CARDIOLOGY | Facility: CLINIC | Age: 48
End: 2023-03-31
Payer: COMMERCIAL

## 2023-03-31 DIAGNOSIS — Z95.2 S/P MVR (MITRAL VALVE REPLACEMENT): ICD-10-CM

## 2023-03-31 DIAGNOSIS — Z95.2 S/P MVR (MITRAL VALVE REPLACEMENT): Primary | ICD-10-CM

## 2023-03-31 LAB
INR PPP: 2.7 (ref 0.91–1.09)
PROTHROMBIN TIME: 32.3 SECONDS (ref 10–13.8)

## 2023-03-31 PROCEDURE — 85610 PROTHROMBIN TIME: CPT | Performed by: INTERNAL MEDICINE

## 2023-04-03 ENCOUNTER — ANTICOAGULATION VISIT (OUTPATIENT)
Dept: CARDIOLOGY | Facility: CLINIC | Age: 48
End: 2023-04-03
Payer: COMMERCIAL

## 2023-04-03 ENCOUNTER — LAB (OUTPATIENT)
Dept: LAB | Facility: HOSPITAL | Age: 48
End: 2023-04-03
Payer: COMMERCIAL

## 2023-04-03 DIAGNOSIS — Z95.2 S/P MVR (MITRAL VALVE REPLACEMENT): ICD-10-CM

## 2023-04-03 DIAGNOSIS — Z95.2 S/P MVR (MITRAL VALVE REPLACEMENT): Primary | ICD-10-CM

## 2023-04-03 LAB
INR PPP: 2.9 (ref 0.91–1.09)
PROTHROMBIN TIME: 34.5 SECONDS (ref 10–13.8)

## 2023-04-03 PROCEDURE — 85610 PROTHROMBIN TIME: CPT | Performed by: INTERNAL MEDICINE

## 2023-04-12 ENCOUNTER — ANTICOAGULATION VISIT (OUTPATIENT)
Dept: CARDIOLOGY | Facility: CLINIC | Age: 48
End: 2023-04-12
Payer: COMMERCIAL

## 2023-04-12 ENCOUNTER — LAB (OUTPATIENT)
Dept: LAB | Facility: HOSPITAL | Age: 48
End: 2023-04-12
Payer: COMMERCIAL

## 2023-04-12 DIAGNOSIS — Z95.2 S/P MVR (MITRAL VALVE REPLACEMENT): ICD-10-CM

## 2023-04-12 DIAGNOSIS — Z95.2 S/P MVR (MITRAL VALVE REPLACEMENT): Primary | ICD-10-CM

## 2023-04-12 LAB
INR PPP: 2.1 (ref 0.91–1.09)
PROTHROMBIN TIME: 25.4 SECONDS (ref 10–13.8)

## 2023-04-12 PROCEDURE — 85610 PROTHROMBIN TIME: CPT | Performed by: INTERNAL MEDICINE

## 2023-04-19 ENCOUNTER — LAB (OUTPATIENT)
Dept: LAB | Facility: HOSPITAL | Age: 48
End: 2023-04-19
Payer: COMMERCIAL

## 2023-04-19 DIAGNOSIS — Z95.2 S/P MVR (MITRAL VALVE REPLACEMENT): ICD-10-CM

## 2023-04-19 LAB
INR PPP: 2.3 (ref 0.91–1.09)
PROTHROMBIN TIME: 27.1 SECONDS (ref 10–13.8)

## 2023-04-19 PROCEDURE — 85610 PROTHROMBIN TIME: CPT | Performed by: INTERNAL MEDICINE

## 2023-04-20 ENCOUNTER — ANTICOAGULATION VISIT (OUTPATIENT)
Dept: CARDIOLOGY | Facility: CLINIC | Age: 48
End: 2023-04-20
Payer: COMMERCIAL

## 2023-04-20 DIAGNOSIS — Z95.2 S/P MVR (MITRAL VALVE REPLACEMENT): Primary | ICD-10-CM

## 2023-05-01 ENCOUNTER — OFFICE (OUTPATIENT)
Dept: URBAN - METROPOLITAN AREA CLINIC 66 | Facility: CLINIC | Age: 48
End: 2023-05-01

## 2023-05-01 VITALS
DIASTOLIC BLOOD PRESSURE: 88 MMHG | HEIGHT: 69 IN | HEART RATE: 86 BPM | WEIGHT: 244 LBS | SYSTOLIC BLOOD PRESSURE: 122 MMHG

## 2023-05-01 DIAGNOSIS — K91.5 POSTCHOLECYSTECTOMY SYNDROME: ICD-10-CM

## 2023-05-01 DIAGNOSIS — Z79.01 LONG TERM (CURRENT) USE OF ANTICOAGULANTS: ICD-10-CM

## 2023-05-01 DIAGNOSIS — K21.9 GASTRO-ESOPHAGEAL REFLUX DISEASE WITHOUT ESOPHAGITIS: ICD-10-CM

## 2023-05-01 DIAGNOSIS — D50.0 IRON DEFICIENCY ANEMIA SECONDARY TO BLOOD LOSS (CHRONIC): ICD-10-CM

## 2023-05-01 DIAGNOSIS — Z79.82 LONG TERM (CURRENT) USE OF ASPIRIN: ICD-10-CM

## 2023-05-01 DIAGNOSIS — R10.9 UNSPECIFIED ABDOMINAL PAIN: ICD-10-CM

## 2023-05-01 PROCEDURE — 99213 OFFICE O/P EST LOW 20 MIN: CPT | Performed by: NURSE PRACTITIONER

## 2023-05-01 RX ORDER — DICYCLOMINE HYDROCHLORIDE 10 MG/1
40 CAPSULE ORAL
Qty: 60 | Refills: 5 | Status: ACTIVE
Start: 2023-05-01

## 2023-05-03 ENCOUNTER — ANTICOAGULATION VISIT (OUTPATIENT)
Dept: CARDIOLOGY | Facility: CLINIC | Age: 48
End: 2023-05-03
Payer: COMMERCIAL

## 2023-05-03 VITALS — BODY MASS INDEX: 36.95 KG/M2 | SYSTOLIC BLOOD PRESSURE: 141 MMHG | WEIGHT: 243 LBS | DIASTOLIC BLOOD PRESSURE: 95 MMHG

## 2023-05-03 DIAGNOSIS — Z95.2 S/P MVR (MITRAL VALVE REPLACEMENT): Primary | ICD-10-CM

## 2023-05-03 LAB — INR PPP: 2.3 (ref 0.9–1.1)

## 2023-05-03 PROCEDURE — 85610 PROTHROMBIN TIME: CPT | Performed by: INTERNAL MEDICINE

## 2023-05-03 PROCEDURE — 36416 COLLJ CAPILLARY BLOOD SPEC: CPT | Performed by: INTERNAL MEDICINE

## 2023-05-06 RX ORDER — WARFARIN SODIUM 4 MG/1
4 TABLET ORAL NIGHTLY
Qty: 30 TABLET | Refills: 2 | Status: SHIPPED | OUTPATIENT
Start: 2023-05-06

## 2023-05-31 ENCOUNTER — TELEPHONE (OUTPATIENT)
Dept: CARDIOLOGY | Facility: CLINIC | Age: 48
End: 2023-05-31

## 2023-05-31 RX ORDER — ASPIRIN 81 MG/1
81 TABLET ORAL DAILY
Qty: 30 TABLET | Refills: 2 | Status: SHIPPED | OUTPATIENT
Start: 2023-05-31 | End: 2023-08-29

## 2023-05-31 NOTE — TELEPHONE ENCOUNTER
Patient would like to start monitoring her warfarin here at our office.    -Is it okay if I go ahead and send in ASA to her pharmacy due to [facility] not prescribing to the patient anymore?

## 2023-05-31 NOTE — TELEPHONE ENCOUNTER
----- Message from Altagracia Tiwari sent at 5/30/2023  5:34 PM EDT -----  Regarding: Medication   Contact: [phone]  Good day, I need refills for my medication.  They are still under [facility].  Can you send them to [facility] on [address] in [facility]? I will then move them when I move in two weeks to another [facility] up there.  The meds are warfarin, aspirin.  Thanks, Altagracia

## 2023-05-31 NOTE — TELEPHONE ENCOUNTER
Rx Refill Note  Requested Prescriptions     Pending Prescriptions Disp Refills   • aspirin 81 MG EC tablet 30 tablet 2     Sig: Take 1 tablet by mouth Daily for 90 days.      Last office visit with prescribing clinician: Visit date not found   Last telemedicine visit with prescribing clinician: Visit date not found   Next office visit with prescribing clinician: 6/26/2023                         Would you like a call back once the refill request has been completed: [] Yes [] No    If the office needs to give you a call back, can they leave a voicemail: [] Yes [] No    Beth Chase MA  05/31/23, 09:09 EDT

## 2023-06-01 ENCOUNTER — TELEPHONE (OUTPATIENT)
Dept: CARDIOLOGY | Facility: CLINIC | Age: 48
End: 2023-06-01

## 2023-06-01 RX ORDER — WARFARIN SODIUM 4 MG/1
TABLET ORAL
Qty: 40 TABLET | Refills: 2 | Status: SHIPPED | OUTPATIENT
Start: 2023-06-01

## 2023-06-01 NOTE — TELEPHONE ENCOUNTER
Rx Refill Note  Requested Prescriptions      No prescriptions requested or ordered in this encounter      Last office visit with prescribing clinician: Visit date not found   Last telemedicine visit with prescribing clinician: Visit date not found   Next office visit with prescribing clinician: 6/26/2023   Anticoagulation Visit 5/3/23 INR  2.3                        Would you like a call back once the refill request has been completed: [] Yes [] No    If the office needs to give you a call back, can they leave a voicemail: [] Yes [] No    Dayanara Maki RN  06/01/23, 16:11 EDT

## 2023-06-01 NOTE — TELEPHONE ENCOUNTER
Incoming Refill Request      Medication requested (name and dose): WARFARIN 4 MG    Pharmacy where request should be sent: [facility] [place], [place]    Additional details provided by patient: HAS BEEN OUT OF MEDICATION FOR 2 DAYS.     Best call back number:[phone]    Does the patient have less than a 3 day supply:  [x] Yes  [] No    Eneida Holley Rep  06/01/23, 14:33 EDT

## 2023-06-05 ENCOUNTER — HOSPITAL ENCOUNTER (OUTPATIENT)
Dept: CT IMAGING | Facility: HOSPITAL | Age: 48
Discharge: HOME | End: 2023-06-05
Admitting: INTERNAL MEDICINE
Payer: COMMERCIAL

## 2023-06-05 DIAGNOSIS — R91.1 NODULE OF MIDDLE LOBE OF RIGHT LUNG: ICD-10-CM

## 2023-06-05 LAB
CREAT BLDA-MCNC: 0.9 MG/DL (ref 0.6–1.3)
EGFRCR SERPLBLD CKD-EPI 2021: 78 ML/MIN/1.73

## 2023-06-05 PROCEDURE — 25510000001 IOPAMIDOL PER 1 ML: Performed by: INTERNAL MEDICINE

## 2023-06-05 PROCEDURE — 82565 ASSAY OF CREATININE: CPT

## 2023-06-05 PROCEDURE — 71260 CT THORAX DX C+: CPT

## 2023-06-05 RX ADMIN — IOPAMIDOL 100 ML: 755 INJECTION, SOLUTION INTRAVENOUS at 15:33

## 2023-06-13 ENCOUNTER — TELEPHONE (OUTPATIENT)
Dept: CARDIAC REHAB | Facility: HOSPITAL | Age: 48
End: 2023-06-13
Payer: COMMERCIAL

## 2023-06-13 ENCOUNTER — TELEPHONE (OUTPATIENT)
Dept: CARDIOLOGY | Facility: CLINIC | Age: 48
End: 2023-06-13

## 2023-06-13 ENCOUNTER — ANTICOAGULATION VISIT (OUTPATIENT)
Dept: CARDIOLOGY | Facility: CLINIC | Age: 48
End: 2023-06-13
Payer: COMMERCIAL

## 2023-06-13 VITALS
DIASTOLIC BLOOD PRESSURE: 82 MMHG | SYSTOLIC BLOOD PRESSURE: 129 MMHG | BODY MASS INDEX: 37.71 KG/M2 | HEART RATE: 87 BPM | WEIGHT: 248 LBS

## 2023-06-13 DIAGNOSIS — Z95.2 S/P MVR (MITRAL VALVE REPLACEMENT): Primary | ICD-10-CM

## 2023-06-13 DIAGNOSIS — Z95.1 S/P CABG X 1: ICD-10-CM

## 2023-06-13 LAB — INR PPP: 2.9 (ref 2.5–3.5)

## 2023-06-13 PROCEDURE — 85610 PROTHROMBIN TIME: CPT | Performed by: INTERNAL MEDICINE

## 2023-06-13 PROCEDURE — 36416 COLLJ CAPILLARY BLOOD SPEC: CPT | Performed by: INTERNAL MEDICINE

## 2023-06-13 RX ORDER — METOPROLOL SUCCINATE 25 MG/1
12.5 TABLET, EXTENDED RELEASE ORAL DAILY
Qty: 90 TABLET | Refills: 3 | Status: SHIPPED | OUTPATIENT
Start: 2023-06-13

## 2023-06-13 NOTE — TELEPHONE ENCOUNTER
Pt needs metoprolol succinate xl sent to [facility] on [address] she moved and has changed pharmacies. Thanks       ASA samples given to patient

## 2023-06-13 NOTE — TELEPHONE ENCOUNTER
Rx Refill Note  Requested Prescriptions     Signed Prescriptions Disp Refills    metoprolol succinate XL (TOPROL-XL) 25 MG 24 hr tablet 90 tablet 3     Sig: Take 0.5 tablets by mouth Daily.     Authorizing Provider: [facility]     Ordering User: MALCOLM FRIEDMAN      Last office visit with prescribing clinician: Visit date not found   Last telemedicine visit with prescribing clinician: Visit date not found   Next office visit with prescribing clinician: 6/26/2023                         Would you like a call back once the refill request has been completed: [] Yes [] No    If the office needs to give you a call back, can they leave a voicemail: [] Yes [] No    Malcolm Friedman MA  06/13/23, 15:17 EDT

## 2023-06-15 ENCOUNTER — TRANSCRIBE ORDERS (OUTPATIENT)
Dept: CARDIAC REHAB | Facility: HOSPITAL | Age: 48
End: 2023-06-15
Payer: COMMERCIAL

## 2023-06-15 ENCOUNTER — TELEPHONE (OUTPATIENT)
Dept: CARDIAC REHAB | Facility: HOSPITAL | Age: 48
End: 2023-06-15
Payer: COMMERCIAL

## 2023-06-15 DIAGNOSIS — Z95.1 S/P CABG (CORONARY ARTERY BYPASS GRAFT): Primary | ICD-10-CM

## 2023-06-15 NOTE — TELEPHONE ENCOUNTER
Da returned call and is interested in doing CR. Will verify insurance for coverage, obtain new order from cardiologist, and call her back.

## 2023-06-26 PROBLEM — R53.83 FATIGUE: Status: ACTIVE | Noted: 2018-06-14

## 2023-06-26 PROBLEM — K22.70 BARRETT'S ESOPHAGUS: Status: ACTIVE | Noted: 2019-07-16

## 2023-06-26 PROBLEM — M06.9 RHEUMATOID ARTHRITIS: Status: ACTIVE | Noted: 2017-04-17

## 2023-06-26 PROBLEM — K21.00 GASTRO-ESOPHAGEAL REFLUX DISEASE WITH ESOPHAGITIS: Status: ACTIVE | Noted: 2023-06-26

## 2023-06-28 ENCOUNTER — APPOINTMENT (OUTPATIENT)
Dept: CARDIAC REHAB | Facility: HOSPITAL | Age: 48
End: 2023-06-28
Payer: COMMERCIAL

## 2023-07-17 ENCOUNTER — APPOINTMENT (OUTPATIENT)
Dept: CARDIAC REHAB | Facility: HOSPITAL | Age: 48
End: 2023-07-17
Payer: COMMERCIAL

## 2023-07-19 ENCOUNTER — APPOINTMENT (OUTPATIENT)
Dept: CARDIAC REHAB | Facility: HOSPITAL | Age: 48
End: 2023-07-19
Payer: COMMERCIAL

## 2023-07-24 ENCOUNTER — TREATMENT (OUTPATIENT)
Dept: CARDIAC REHAB | Facility: HOSPITAL | Age: 48
End: 2023-07-24
Payer: COMMERCIAL

## 2023-07-24 DIAGNOSIS — Z95.1 S/P CABG (CORONARY ARTERY BYPASS GRAFT): Primary | ICD-10-CM

## 2023-07-24 PROCEDURE — 93798 PHYS/QHP OP CAR RHAB W/ECG: CPT

## 2023-07-26 ENCOUNTER — TELEPHONE (OUTPATIENT)
Dept: CARDIOLOGY | Facility: CLINIC | Age: 48
End: 2023-07-26
Payer: COMMERCIAL

## 2023-07-26 ENCOUNTER — TELEPHONE (OUTPATIENT)
Dept: MAMMOGRAPHY | Facility: HOSPITAL | Age: 48
End: 2023-07-26
Payer: COMMERCIAL

## 2023-07-26 ENCOUNTER — TREATMENT (OUTPATIENT)
Dept: CARDIAC REHAB | Facility: HOSPITAL | Age: 48
End: 2023-07-26
Payer: COMMERCIAL

## 2023-07-26 DIAGNOSIS — Z95.2 S/P MVR (MITRAL VALVE REPLACEMENT): Primary | ICD-10-CM

## 2023-07-26 DIAGNOSIS — Z95.1 S/P CABG (CORONARY ARTERY BYPASS GRAFT): Primary | ICD-10-CM

## 2023-07-26 PROCEDURE — 93798 PHYS/QHP OP CAR RHAB W/ECG: CPT

## 2023-07-26 RX ORDER — ENOXAPARIN SODIUM 150 MG/ML
1 INJECTION SUBCUTANEOUS EVERY 12 HOURS SCHEDULED
Qty: 8 ML | Refills: 0 | Status: SHIPPED | OUTPATIENT
Start: 2023-07-26

## 2023-07-26 NOTE — TELEPHONE ENCOUNTER
Dr. Burns called to let me know Altagracia is able to go off her warfarin for the bx , but will need to bridge with lovenox. He will be contacting her to let her know the specifics.

## 2023-07-26 NOTE — PROGRESS NOTES
See the written copy of this report in the patient's paper medical record.  These results did not interface directly into the electronic medical record and are summarized here.

## 2023-07-26 NOTE — TELEPHONE ENCOUNTER
Pt scheduled for stereotactic breast bx. Per Dr Burns pt needs to hold warfarin x3 days start lovenox 1mg/kg q12hrs with warfarin the day after the biopsy procedure until INR is therapeutic #10. Called [facility]OM for pt to return call. Dr Burns has discussed with patient. [facility]

## 2023-07-26 NOTE — TELEPHONE ENCOUNTER
Spoke with pt advised start warfarin with lovenox 120mg every 12 hours will check INR on Monday in office patient scheduled. Pt request RX for lovenox be sent to [facility] and [address]

## 2023-07-31 ENCOUNTER — TREATMENT (OUTPATIENT)
Dept: CARDIAC REHAB | Facility: HOSPITAL | Age: 48
End: 2023-07-31
Payer: COMMERCIAL

## 2023-07-31 DIAGNOSIS — Z95.1 S/P CABG (CORONARY ARTERY BYPASS GRAFT): Primary | ICD-10-CM

## 2023-07-31 PROCEDURE — 93798 PHYS/QHP OP CAR RHAB W/ECG: CPT

## 2023-08-02 ENCOUNTER — HOSPITAL ENCOUNTER (OUTPATIENT)
Dept: MAMMOGRAPHY | Facility: HOSPITAL | Age: 48
Discharge: HOME | End: 2023-08-02
Payer: COMMERCIAL

## 2023-08-02 ENCOUNTER — APPOINTMENT (OUTPATIENT)
Dept: CARDIAC REHAB | Facility: HOSPITAL | Age: 48
End: 2023-08-02
Payer: COMMERCIAL

## 2023-08-02 DIAGNOSIS — R92.1 BREAST CALCIFICATIONS: ICD-10-CM

## 2023-08-02 PROCEDURE — 76098 X-RAY EXAM SURGICAL SPECIMEN: CPT

## 2023-08-02 PROCEDURE — 0 LIDOCAINE 1 % SOLUTION: Performed by: INTERNAL MEDICINE

## 2023-08-02 PROCEDURE — A4648 IMPLANTABLE TISSUE MARKER: HCPCS

## 2023-08-02 RX ORDER — LIDOCAINE HYDROCHLORIDE 10 MG/ML
3 INJECTION, SOLUTION INFILTRATION; PERINEURAL ONCE
Status: COMPLETED | OUTPATIENT
Start: 2023-08-02 | End: 2023-08-02

## 2023-08-02 RX ORDER — LIDOCAINE HYDROCHLORIDE AND EPINEPHRINE 10; 10 MG/ML; UG/ML
8 INJECTION, SOLUTION INFILTRATION; PERINEURAL ONCE
Status: COMPLETED | OUTPATIENT
Start: 2023-08-02 | End: 2023-08-02

## 2023-08-02 RX ADMIN — Medication 1 ML: at 12:33

## 2023-08-02 RX ADMIN — LIDOCAINE HYDROCHLORIDE,EPINEPHRINE BITARTRATE 5 ML: 10; .01 INJECTION, SOLUTION INFILTRATION; PERINEURAL at 12:04

## 2023-08-02 RX ADMIN — Medication 1.5 ML: at 12:04

## 2023-08-02 RX ADMIN — LIDOCAINE HYDROCHLORIDE,EPINEPHRINE BITARTRATE 5 ML: 10; .01 INJECTION, SOLUTION INFILTRATION; PERINEURAL at 12:34

## 2023-08-06 ENCOUNTER — APPOINTMENT (OUTPATIENT)
Dept: ULTRASOUND IMAGING | Facility: HOSPITAL | Age: 48
End: 2023-08-06
Payer: COMMERCIAL

## 2023-08-06 ENCOUNTER — HOSPITAL ENCOUNTER (EMERGENCY)
Facility: HOSPITAL | Age: 48
Discharge: HOME | End: 2023-08-06
Attending: EMERGENCY MEDICINE | Admitting: EMERGENCY MEDICINE
Payer: COMMERCIAL

## 2023-08-06 VITALS
SYSTOLIC BLOOD PRESSURE: 110 MMHG | BODY MASS INDEX: 39.36 KG/M2 | TEMPERATURE: 97.8 F | DIASTOLIC BLOOD PRESSURE: 76 MMHG | OXYGEN SATURATION: 94 % | WEIGHT: 259.7 LBS | HEART RATE: 93 BPM | RESPIRATION RATE: 18 BRPM | HEIGHT: 68 IN

## 2023-08-06 DIAGNOSIS — N61.0 CELLULITIS OF BREAST: Primary | ICD-10-CM

## 2023-08-06 LAB
ANION GAP SERPL CALCULATED.3IONS-SCNC: 8 MMOL/L (ref 5–15)
APTT PPP: 46.8 SECONDS (ref 61–76.5)
BASOPHILS # BLD AUTO: 0.1 10*3/MM3 (ref 0–0.2)
BASOPHILS NFR BLD AUTO: 0.9 % (ref 0–1.5)
BUN SERPL-MCNC: 12 MG/DL (ref 6–20)
BUN/CREAT SERPL: 15.8 (ref 7–25)
CALCIUM SPEC-SCNC: 9.2 MG/DL (ref 8.6–10.5)
CHLORIDE SERPL-SCNC: 101 MMOL/L (ref 98–107)
CO2 SERPL-SCNC: 26 MMOL/L (ref 22–29)
CREAT SERPL-MCNC: 0.76 MG/DL (ref 0.57–1)
DEPRECATED RDW RBC AUTO: 50.3 FL (ref 37–54)
EGFRCR SERPLBLD CKD-EPI 2021: 95.6 ML/MIN/1.73
EOSINOPHIL # BLD AUTO: 0.1 10*3/MM3 (ref 0–0.4)
EOSINOPHIL NFR BLD AUTO: 1.5 % (ref 0.3–6.2)
ERYTHROCYTE [DISTWIDTH] IN BLOOD BY AUTOMATED COUNT: 15.6 % (ref 12.3–15.4)
GLUCOSE SERPL-MCNC: 105 MG/DL (ref 65–99)
HCT VFR BLD AUTO: 37.4 % (ref 34–46.6)
HGB BLD-MCNC: 12.3 G/DL (ref 12–15.9)
INR PPP: 1.17 (ref 2–3)
LYMPHOCYTES # BLD AUTO: 1.2 10*3/MM3 (ref 0.7–3.1)
LYMPHOCYTES NFR BLD AUTO: 21 % (ref 19.6–45.3)
MCH RBC QN AUTO: 29.2 PG (ref 26.6–33)
MCHC RBC AUTO-ENTMCNC: 32.8 G/DL (ref 31.5–35.7)
MCV RBC AUTO: 89 FL (ref 79–97)
MONOCYTES # BLD AUTO: 0.4 10*3/MM3 (ref 0.1–0.9)
MONOCYTES NFR BLD AUTO: 7.9 % (ref 5–12)
NEUTROPHILS NFR BLD AUTO: 3.9 10*3/MM3 (ref 1.7–7)
NEUTROPHILS NFR BLD AUTO: 68.7 % (ref 42.7–76)
NRBC BLD AUTO-RTO: 0.1 /100 WBC (ref 0–0.2)
PLATELET # BLD AUTO: 250 10*3/MM3 (ref 140–450)
PMV BLD AUTO: 7.3 FL (ref 6–12)
POTASSIUM SERPL-SCNC: 4.1 MMOL/L (ref 3.5–5.2)
PROTHROMBIN TIME: 12.4 SECONDS (ref 19.4–28.5)
RBC # BLD AUTO: 4.2 10*6/MM3 (ref 3.77–5.28)
SODIUM SERPL-SCNC: 135 MMOL/L (ref 136–145)
WBC NRBC COR # BLD: 5.7 10*3/MM3 (ref 3.4–10.8)

## 2023-08-06 PROCEDURE — 76642 ULTRASOUND BREAST LIMITED: CPT

## 2023-08-06 PROCEDURE — 85610 PROTHROMBIN TIME: CPT | Performed by: PHYSICIAN ASSISTANT

## 2023-08-06 PROCEDURE — 25010000002 CEFAZOLIN PER 500 MG: Performed by: PHYSICIAN ASSISTANT

## 2023-08-06 PROCEDURE — 25010000002 ONDANSETRON PER 1 MG: Performed by: PHYSICIAN ASSISTANT

## 2023-08-06 PROCEDURE — 85025 COMPLETE CBC W/AUTO DIFF WBC: CPT | Performed by: PHYSICIAN ASSISTANT

## 2023-08-06 PROCEDURE — 96376 TX/PRO/DX INJ SAME DRUG ADON: CPT

## 2023-08-06 PROCEDURE — 96375 TX/PRO/DX INJ NEW DRUG ADDON: CPT

## 2023-08-06 PROCEDURE — 80048 BASIC METABOLIC PNL TOTAL CA: CPT | Performed by: PHYSICIAN ASSISTANT

## 2023-08-06 PROCEDURE — 96365 THER/PROPH/DIAG IV INF INIT: CPT

## 2023-08-06 PROCEDURE — 25010000002 HYDROMORPHONE 1 MG/ML SOLUTION: Performed by: PHYSICIAN ASSISTANT

## 2023-08-06 PROCEDURE — 85730 THROMBOPLASTIN TIME PARTIAL: CPT | Performed by: PHYSICIAN ASSISTANT

## 2023-08-06 PROCEDURE — 99284 EMERGENCY DEPT VISIT MOD MDM: CPT

## 2023-08-06 RX ORDER — FLUCONAZOLE 150 MG/1
150 TABLET ORAL ONCE
Qty: 1 TABLET | Refills: 0 | Status: SHIPPED | OUTPATIENT
Start: 2023-08-06 | End: 2023-08-06

## 2023-08-06 RX ORDER — OXYCODONE AND ACETAMINOPHEN 7.5; 325 MG/1; MG/1
1 TABLET ORAL EVERY 6 HOURS PRN
Qty: 12 TABLET | Refills: 0 | Status: SHIPPED | OUTPATIENT
Start: 2023-08-06 | End: 2023-08-08 | Stop reason: SDUPTHER

## 2023-08-06 RX ORDER — SODIUM CHLORIDE 0.9 % (FLUSH) 0.9 %
10 SYRINGE (ML) INJECTION AS NEEDED
Status: DISCONTINUED | OUTPATIENT
Start: 2023-08-06 | End: 2023-08-06 | Stop reason: HOSPADM

## 2023-08-06 RX ORDER — ONDANSETRON 2 MG/ML
4 INJECTION INTRAMUSCULAR; INTRAVENOUS ONCE
Status: COMPLETED | OUTPATIENT
Start: 2023-08-06 | End: 2023-08-06

## 2023-08-06 RX ORDER — CEPHALEXIN 500 MG/1
500 CAPSULE ORAL 4 TIMES DAILY
Qty: 28 CAPSULE | Refills: 0 | Status: SHIPPED | OUTPATIENT
Start: 2023-08-06 | End: 2023-08-13

## 2023-08-06 RX ADMIN — HYDROMORPHONE HYDROCHLORIDE 1 MG: 1 INJECTION, SOLUTION INTRAMUSCULAR; INTRAVENOUS; SUBCUTANEOUS at 11:41

## 2023-08-06 RX ADMIN — ONDANSETRON 4 MG: 2 INJECTION INTRAMUSCULAR; INTRAVENOUS at 11:41

## 2023-08-06 RX ADMIN — HYDROMORPHONE HYDROCHLORIDE 0.5 MG: 1 INJECTION, SOLUTION INTRAMUSCULAR; INTRAVENOUS; SUBCUTANEOUS at 13:23

## 2023-08-06 RX ADMIN — CEFAZOLIN 1000 MG: 1 INJECTION, POWDER, FOR SOLUTION INTRAMUSCULAR; INTRAVENOUS at 13:23
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
## 2023-08-06 NOTE — DISCHARGE INSTRUCTIONS
Take antibiotic as directed.  Be sure to take full course.     Take pain medicine as directed.  Do not operate heavy machinery or drink alcohol while taking this medication.     Follow-up with the provider who did your biopsy tomorrow for further management.    Follow-up with your primary care provider in 3-5 days.  If you do not have a primary care provider call [phone] for help in finding one, or you may follow up with [facility] at [phone].    Return to ED for any new or worsening symptom

## 2023-08-07 ENCOUNTER — DOCUMENTATION (OUTPATIENT)
Dept: MAMMOGRAPHY | Facility: HOSPITAL | Age: 48
End: 2023-08-07
Payer: COMMERCIAL

## 2023-08-07 ENCOUNTER — APPOINTMENT (OUTPATIENT)
Dept: CARDIAC REHAB | Facility: HOSPITAL | Age: 48
End: 2023-08-07
Payer: COMMERCIAL

## 2023-08-07 ENCOUNTER — ANTICOAGULATION VISIT (OUTPATIENT)
Dept: CARDIOLOGY | Facility: CLINIC | Age: 48
End: 2023-08-07
Payer: COMMERCIAL

## 2023-08-07 VITALS
HEART RATE: 99 BPM | SYSTOLIC BLOOD PRESSURE: 132 MMHG | WEIGHT: 260 LBS | BODY MASS INDEX: 39.53 KG/M2 | DIASTOLIC BLOOD PRESSURE: 87 MMHG | OXYGEN SATURATION: 96 %

## 2023-08-07 DIAGNOSIS — Z95.2 S/P MVR (MITRAL VALVE REPLACEMENT): Primary | ICD-10-CM

## 2023-08-07 LAB — INR PPP: 2.1 (ref 2–3)

## 2023-08-07 PROCEDURE — 36416 COLLJ CAPILLARY BLOOD SPEC: CPT | Performed by: INTERNAL MEDICINE

## 2023-08-07 PROCEDURE — 85610 PROTHROMBIN TIME: CPT | Performed by: INTERNAL MEDICINE

## 2023-08-07 NOTE — PROGRESS NOTES
Patient presented to the breast center today for re-evaluation after biopsy. Pt experienced increase in breast size, increased redness, and increased firmness which began on Saturday and worsened into Sunday. Pt presented to the ER on 8/6/2023 for evaluation due to the pain.    Pt is on anticoagulation and had restarted her medication after the procedure. On physical examination, the lateral and superior aspect of the breast was firm with areas of bruising noted. No evidence of active bleeding through the skin. No warmth or fluctuance was noted. The breast ultrasound from yesterday was reviewed. Imaging and clinical findings most likely reflect hematoma formation, likely due to anticoagulation. Labs drawn in the ER didn't not show an elevated WBC count.    Plan to finish course of oral antibiotics to treat any mild cellulitis associated with the biopsy skin site. Advised patient to watch for increased bruising or new focal lumps to indicate additional new hematoma formation.    Pt will follow up at the breast center in approximately 2 weeks unless follow up needed sooner.

## 2023-08-08 DIAGNOSIS — N61.0 CELLULITIS OF BREAST: ICD-10-CM

## 2023-08-08 RX ORDER — OXYCODONE AND ACETAMINOPHEN 7.5; 325 MG/1; MG/1
1 TABLET ORAL EVERY 6 HOURS PRN
Qty: 12 TABLET | Refills: 0 | Status: SHIPPED | OUTPATIENT
Start: 2023-08-08

## 2023-08-09 ENCOUNTER — ANTICOAGULATION VISIT (OUTPATIENT)
Dept: CARDIOLOGY | Facility: CLINIC | Age: 48
End: 2023-08-09
Payer: COMMERCIAL

## 2023-08-09 ENCOUNTER — APPOINTMENT (OUTPATIENT)
Dept: CARDIAC REHAB | Facility: HOSPITAL | Age: 48
End: 2023-08-09
Payer: COMMERCIAL

## 2023-08-09 VITALS
SYSTOLIC BLOOD PRESSURE: 143 MMHG | DIASTOLIC BLOOD PRESSURE: 92 MMHG | WEIGHT: 260 LBS | HEART RATE: 104 BPM | BODY MASS INDEX: 39.53 KG/M2

## 2023-08-09 DIAGNOSIS — Z95.2 S/P MVR (MITRAL VALVE REPLACEMENT): Primary | ICD-10-CM

## 2023-08-09 LAB
INR PPP: 1.9 (ref 2.5–3.5)
LAB AP CASE REPORT: NORMAL
LAB AP CASE REPORT: NORMAL
LAB AP DIAGNOSIS COMMENT: NORMAL
LAB AP DIAGNOSIS COMMENT: NORMAL
PATH REPORT.FINAL DX SPEC: NORMAL
PATH REPORT.FINAL DX SPEC: NORMAL
PATH REPORT.GROSS SPEC: NORMAL
PATH REPORT.GROSS SPEC: NORMAL

## 2023-08-09 PROCEDURE — 36416 COLLJ CAPILLARY BLOOD SPEC: CPT | Performed by: INTERNAL MEDICINE

## 2023-08-09 PROCEDURE — 85610 PROTHROMBIN TIME: CPT | Performed by: INTERNAL MEDICINE

## 2023-08-14 ENCOUNTER — APPOINTMENT (OUTPATIENT)
Dept: CARDIAC REHAB | Facility: HOSPITAL | Age: 48
End: 2023-08-14
Payer: COMMERCIAL

## 2023-08-15 NOTE — PROGRESS NOTES
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     Dispense:  30 tablet     Refill:  2    ALPRAZolam (Xanax) 0.25 MG tablet     Sig: Take 1 tablet by mouth 2 (Two) Times a Day As Needed for Anxiety.     Dispense:  60 tablet     Refill:  2       Return in about 3 months (around 11/16/2023).         This document has been electronically signed by ISSAC Fleming  August 16, 2023 09:56 EDT    Part of this note may be an electronic transcription/translation of spoken language to printed text using the Dragon Dictation System. Some of the data in this electronic note has been brought forward from a previous encounter, any necessary changes have been made, it has been reviewed by this APRN, and it is accurate.

## 2023-08-16 ENCOUNTER — TRANSCRIBE ORDERS (OUTPATIENT)
Dept: ADMINISTRATIVE | Facility: HOSPITAL | Age: 48
End: 2023-08-16
Payer: COMMERCIAL

## 2023-08-16 ENCOUNTER — OFFICE VISIT (OUTPATIENT)
Dept: PSYCHIATRY | Facility: CLINIC | Age: 48
End: 2023-08-16
Payer: COMMERCIAL

## 2023-08-16 VITALS
WEIGHT: 246.8 LBS | HEART RATE: 91 BPM | DIASTOLIC BLOOD PRESSURE: 79 MMHG | BODY MASS INDEX: 37.41 KG/M2 | SYSTOLIC BLOOD PRESSURE: 113 MMHG | OXYGEN SATURATION: 95 % | HEIGHT: 68 IN

## 2023-08-16 DIAGNOSIS — F41.1 GENERALIZED ANXIETY DISORDER: Primary | Chronic | ICD-10-CM

## 2023-08-16 DIAGNOSIS — F51.05 INSOMNIA DUE TO MENTAL CONDITION: Chronic | ICD-10-CM

## 2023-08-16 DIAGNOSIS — F41.0 PANIC DISORDER: Chronic | ICD-10-CM

## 2023-08-16 DIAGNOSIS — F33.1 MAJOR DEPRESSIVE DISORDER, RECURRENT EPISODE, MODERATE: Chronic | ICD-10-CM

## 2023-08-16 DIAGNOSIS — R91.8 MULTIPLE NODULES OF LUNG: Primary | ICD-10-CM

## 2023-08-16 RX ORDER — ALPRAZOLAM 0.25 MG/1
0.25 TABLET ORAL 2 TIMES DAILY PRN
Qty: 60 TABLET | Refills: 2 | Status: SHIPPED | OUTPATIENT
Start: 2023-08-16 | End: 2024-08-15

## 2023-08-16 RX ORDER — ONDANSETRON 4 MG/1
1 TABLET, FILM COATED ORAL EVERY 12 HOURS SCHEDULED
COMMUNITY
Start: 2023-07-11

## 2023-08-16 RX ORDER — ATORVASTATIN CALCIUM 40 MG/1
1 TABLET, FILM COATED ORAL DAILY
COMMUNITY
Start: 2023-08-03

## 2023-08-16 RX ORDER — VENLAFAXINE HYDROCHLORIDE 150 MG/1
150 CAPSULE, EXTENDED RELEASE ORAL DAILY
Qty: 30 CAPSULE | Refills: 2 | Status: SHIPPED | OUTPATIENT
Start: 2023-08-16

## 2023-08-16 RX ORDER — TRAZODONE HYDROCHLORIDE 100 MG/1
100 TABLET ORAL NIGHTLY
Qty: 30 TABLET | Refills: 2 | Status: SHIPPED | OUTPATIENT
Start: 2023-08-16

## 2023-08-16 RX ORDER — LAMOTRIGINE 200 MG/1
200 TABLET ORAL DAILY
Qty: 30 TABLET | Refills: 2 | Status: SHIPPED | OUTPATIENT
Start: 2023-08-16

## 2023-08-16 RX ORDER — FLUCONAZOLE 150 MG/1
150 TABLET ORAL ONCE
COMMUNITY
Start: 2023-08-06

## 2023-08-21 ENCOUNTER — TREATMENT (OUTPATIENT)
Dept: CARDIAC REHAB | Facility: HOSPITAL | Age: 48
End: 2023-08-21
Payer: COMMERCIAL

## 2023-08-21 ENCOUNTER — ANTICOAGULATION VISIT (OUTPATIENT)
Dept: CARDIOLOGY | Facility: CLINIC | Age: 48
End: 2023-08-21
Payer: COMMERCIAL

## 2023-08-21 VITALS
DIASTOLIC BLOOD PRESSURE: 93 MMHG | HEART RATE: 92 BPM | SYSTOLIC BLOOD PRESSURE: 141 MMHG | WEIGHT: 259 LBS | BODY MASS INDEX: 39.38 KG/M2

## 2023-08-21 DIAGNOSIS — Z95.2 S/P MVR (MITRAL VALVE REPLACEMENT): ICD-10-CM

## 2023-08-21 DIAGNOSIS — Z95.1 S/P CABG (CORONARY ARTERY BYPASS GRAFT): Primary | ICD-10-CM

## 2023-08-21 DIAGNOSIS — Z95.2 S/P MVR (MITRAL VALVE REPLACEMENT): Primary | ICD-10-CM

## 2023-08-21 LAB — INR PPP: 2.5 (ref 2.5–3.5)

## 2023-08-21 PROCEDURE — 36416 COLLJ CAPILLARY BLOOD SPEC: CPT | Performed by: INTERNAL MEDICINE

## 2023-08-21 PROCEDURE — 85610 PROTHROMBIN TIME: CPT | Performed by: INTERNAL MEDICINE

## 2023-08-21 PROCEDURE — 93798 PHYS/QHP OP CAR RHAB W/ECG: CPT

## 2023-08-23 ENCOUNTER — APPOINTMENT (OUTPATIENT)
Dept: CARDIAC REHAB | Facility: HOSPITAL | Age: 48
End: 2023-08-23
Payer: COMMERCIAL

## 2023-08-24 ENCOUNTER — TREATMENT (OUTPATIENT)
Dept: CARDIAC REHAB | Facility: HOSPITAL | Age: 48
End: 2023-08-24
Payer: COMMERCIAL

## 2023-08-24 DIAGNOSIS — Z95.1 S/P CABG (CORONARY ARTERY BYPASS GRAFT): Primary | ICD-10-CM

## 2023-08-24 PROCEDURE — 93798 PHYS/QHP OP CAR RHAB W/ECG: CPT

## 2023-08-28 ENCOUNTER — APPOINTMENT (OUTPATIENT)
Dept: CARDIAC REHAB | Facility: HOSPITAL | Age: 48
End: 2023-08-28
Payer: COMMERCIAL

## 2023-08-30 ENCOUNTER — APPOINTMENT (OUTPATIENT)
Dept: CARDIAC REHAB | Facility: HOSPITAL | Age: 48
End: 2023-08-30
Payer: COMMERCIAL

## 2023-09-04 ENCOUNTER — APPOINTMENT (OUTPATIENT)
Dept: CARDIAC REHAB | Facility: HOSPITAL | Age: 48
End: 2023-09-04
Payer: COMMERCIAL

## 2023-09-06 ENCOUNTER — APPOINTMENT (OUTPATIENT)
Dept: CARDIAC REHAB | Facility: HOSPITAL | Age: 48
End: 2023-09-06
Payer: COMMERCIAL

## 2023-09-06 ENCOUNTER — TELEPHONE (OUTPATIENT)
Dept: CARDIOLOGY | Facility: CLINIC | Age: 48
End: 2023-09-06
Payer: COMMERCIAL

## 2023-09-09 DIAGNOSIS — Z79.01 LONG TERM (CURRENT) USE OF ANTICOAGULANTS: ICD-10-CM

## 2023-09-09 DIAGNOSIS — Z95.2 S/P MVR (MITRAL VALVE REPLACEMENT): Primary | ICD-10-CM

## 2023-09-11 ENCOUNTER — APPOINTMENT (OUTPATIENT)
Dept: CARDIAC REHAB | Facility: HOSPITAL | Age: 48
End: 2023-09-11
Payer: COMMERCIAL

## 2023-09-11 RX ORDER — WARFARIN SODIUM 4 MG/1
TABLET ORAL
Qty: 40 TABLET | Refills: 2 | Status: SHIPPED | OUTPATIENT
Start: 2023-09-11

## 2023-09-11 NOTE — TELEPHONE ENCOUNTER
Rx Refill Note  Requested Prescriptions     Pending Prescriptions Disp Refills    warfarin (COUMADIN) 4 MG tablet [Pharmacy Med Name: WARFARIN SOD 4MG TABLETS (BLUE)] 40 tablet 2     Sig: Take 1 1/2 tabs, by mouth, on Mon, Weds, Fri and Sat with 1 tab on Sun, Tues and Thurs or as directed.    Last INR 8/21/23  Last office visit with prescribing clinician: 6/26/2023   Last telemedicine visit with prescribing clinician: Visit date not found   Next office visit with prescribing clinician: 12/26/2023                         Would you like a call back once the refill request has been completed: [] Yes [] No    If the office needs to give you a call back, can they leave a voicemail: [] Yes [] No    Tiarra Quijano RN  09/11/23, 09:38 EDT

## 2023-09-13 ENCOUNTER — APPOINTMENT (OUTPATIENT)
Dept: CARDIAC REHAB | Facility: HOSPITAL | Age: 48
End: 2023-09-13
Payer: COMMERCIAL

## 2023-09-18 ENCOUNTER — TELEPHONE (OUTPATIENT)
Dept: CARDIOLOGY | Facility: CLINIC | Age: 48
End: 2023-09-18
Payer: COMMERCIAL

## 2023-09-18 ENCOUNTER — APPOINTMENT (OUTPATIENT)
Dept: CARDIAC REHAB | Facility: HOSPITAL | Age: 48
End: 2023-09-18
Payer: COMMERCIAL

## 2023-09-18 ENCOUNTER — ANTICOAGULATION VISIT (OUTPATIENT)
Dept: CARDIOLOGY | Facility: CLINIC | Age: 48
End: 2023-09-18
Payer: COMMERCIAL

## 2023-09-18 VITALS
SYSTOLIC BLOOD PRESSURE: 147 MMHG | DIASTOLIC BLOOD PRESSURE: 102 MMHG | BODY MASS INDEX: 39.99 KG/M2 | HEART RATE: 95 BPM | WEIGHT: 263 LBS

## 2023-09-18 DIAGNOSIS — Z95.2 S/P MVR (MITRAL VALVE REPLACEMENT): Primary | ICD-10-CM

## 2023-09-18 DIAGNOSIS — I10 HYPERTENSION, UNSPECIFIED TYPE: Primary | ICD-10-CM

## 2023-09-18 LAB — INR PPP: 2.9 (ref 2.5–3.5)

## 2023-09-18 PROCEDURE — 36416 COLLJ CAPILLARY BLOOD SPEC: CPT | Performed by: INTERNAL MEDICINE

## 2023-09-18 PROCEDURE — 85610 PROTHROMBIN TIME: CPT | Performed by: INTERNAL MEDICINE

## 2023-09-18 RX ORDER — AMLODIPINE BESYLATE 5 MG/1
5 TABLET ORAL DAILY
Qty: 30 TABLET | Refills: 11 | Status: SHIPPED | OUTPATIENT
Start: 2023-09-18

## 2023-09-18 NOTE — TELEPHONE ENCOUNTER
Pt blood pressure elevated in office 148/102 and 147/102 spoke with Dr. Burns. He advises start Norvasc 5mg once daily record daily blood pressures x2 weeks and report readings to office for further recommendations. Pt advised and she will send readings in via [employer]. Called advised per Dr. Burns pt will not participate in cardiac rehab for a couple of days apLPN

## 2023-09-20 ENCOUNTER — APPOINTMENT (OUTPATIENT)
Dept: CARDIAC REHAB | Facility: HOSPITAL | Age: 48
End: 2023-09-20
Payer: COMMERCIAL

## 2023-09-21 ENCOUNTER — TREATMENT (OUTPATIENT)
Dept: CARDIAC REHAB | Facility: HOSPITAL | Age: 48
End: 2023-09-21
Payer: COMMERCIAL

## 2023-09-21 DIAGNOSIS — Z95.1 S/P CABG (CORONARY ARTERY BYPASS GRAFT): Primary | ICD-10-CM

## 2023-09-21 PROCEDURE — 93798 PHYS/QHP OP CAR RHAB W/ECG: CPT

## 2023-09-25 ENCOUNTER — APPOINTMENT (OUTPATIENT)
Dept: CARDIAC REHAB | Facility: HOSPITAL | Age: 48
End: 2023-09-25
Payer: COMMERCIAL

## 2023-09-26 ENCOUNTER — APPOINTMENT (OUTPATIENT)
Dept: CARDIAC REHAB | Facility: HOSPITAL | Age: 48
End: 2023-09-26
Payer: COMMERCIAL

## 2023-09-27 ENCOUNTER — TREATMENT (OUTPATIENT)
Dept: CARDIAC REHAB | Facility: HOSPITAL | Age: 48
End: 2023-09-27
Payer: COMMERCIAL

## 2023-09-27 DIAGNOSIS — Z95.1 S/P CABG (CORONARY ARTERY BYPASS GRAFT): Primary | ICD-10-CM

## 2023-09-27 PROCEDURE — 93798 PHYS/QHP OP CAR RHAB W/ECG: CPT

## 2023-09-28 ENCOUNTER — APPOINTMENT (OUTPATIENT)
Dept: CARDIAC REHAB | Facility: HOSPITAL | Age: 48
End: 2023-09-28
Payer: COMMERCIAL

## 2023-10-09 ENCOUNTER — TRANSCRIBE ORDERS (OUTPATIENT)
Dept: ADMINISTRATIVE | Facility: HOSPITAL | Age: 48
End: 2023-10-09
Payer: COMMERCIAL

## 2023-10-10 DIAGNOSIS — Z95.1 S/P CABG X 1: ICD-10-CM

## 2023-10-10 RX ORDER — METOPROLOL SUCCINATE 25 MG/1
12.5 TABLET, EXTENDED RELEASE ORAL DAILY
Qty: 90 TABLET | Refills: 3 | Status: SHIPPED | OUTPATIENT
Start: 2023-10-10

## 2023-10-10 NOTE — TELEPHONE ENCOUNTER
Rx Refill Note  Requested Prescriptions     Pending Prescriptions Disp Refills    metoprolol succinate XL (TOPROL-XL) 25 MG 24 hr tablet [Pharmacy Med Name: METOPROLOL ER SUCCINATE 25MG TABS] 90 tablet 3     Sig: TAKE 1/2 TABLET BY MOUTH EVERY DAY      Last office visit with prescribing clinician: 6/26/2023   Last telemedicine visit with prescribing clinician: Visit date not found   Next office visit with prescribing clinician: 12/26/2023                         Would you like a call back once the refill request has been completed: [] Yes [] No    If the office needs to give you a call back, can they leave a voicemail: [] Yes [] No    Beth Chase MA  10/10/23, 16:05 EDT

## 2023-10-30 ENCOUNTER — ANTICOAGULATION VISIT (OUTPATIENT)
Dept: CARDIOLOGY | Facility: CLINIC | Age: 48
End: 2023-10-30
Payer: COMMERCIAL

## 2023-10-30 VITALS
HEART RATE: 88 BPM | BODY MASS INDEX: 40.75 KG/M2 | WEIGHT: 268 LBS | DIASTOLIC BLOOD PRESSURE: 95 MMHG | SYSTOLIC BLOOD PRESSURE: 123 MMHG

## 2023-10-30 DIAGNOSIS — Z95.2 S/P MVR (MITRAL VALVE REPLACEMENT): Primary | ICD-10-CM

## 2023-10-30 LAB — INR PPP: 3.1 (ref 2.5–3.5)

## 2023-10-30 PROCEDURE — 36416 COLLJ CAPILLARY BLOOD SPEC: CPT | Performed by: INTERNAL MEDICINE

## 2023-10-30 PROCEDURE — 85610 PROTHROMBIN TIME: CPT | Performed by: INTERNAL MEDICINE
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
## 2023-11-16 ENCOUNTER — OFFICE VISIT (OUTPATIENT)
Dept: PSYCHIATRY | Facility: CLINIC | Age: 48
End: 2023-11-16
Payer: COMMERCIAL

## 2023-11-16 DIAGNOSIS — F51.05 INSOMNIA DUE TO MENTAL CONDITION: Chronic | ICD-10-CM

## 2023-11-16 DIAGNOSIS — F41.1 GENERALIZED ANXIETY DISORDER: Chronic | ICD-10-CM

## 2023-11-16 DIAGNOSIS — F41.0 PANIC DISORDER: Chronic | ICD-10-CM

## 2023-11-16 DIAGNOSIS — F33.1 MAJOR DEPRESSIVE DISORDER, RECURRENT EPISODE, MODERATE: Primary | Chronic | ICD-10-CM

## 2023-11-16 RX ORDER — LAMOTRIGINE 200 MG/1
200 TABLET ORAL DAILY
Qty: 30 TABLET | Refills: 2 | Status: SHIPPED | OUTPATIENT
Start: 2023-11-16

## 2023-11-16 RX ORDER — ALPRAZOLAM 0.25 MG/1
0.25 TABLET ORAL 2 TIMES DAILY PRN
Qty: 60 TABLET | Refills: 2 | Status: SHIPPED | OUTPATIENT
Start: 2023-11-16 | End: 2024-11-15

## 2023-11-16 RX ORDER — VENLAFAXINE HYDROCHLORIDE 150 MG/1
150 CAPSULE, EXTENDED RELEASE ORAL DAILY
Qty: 30 CAPSULE | Refills: 2 | Status: SHIPPED | OUTPATIENT
Start: 2023-11-16

## 2023-11-16 RX ORDER — TRAZODONE HYDROCHLORIDE 100 MG/1
100 TABLET ORAL NIGHTLY
Qty: 30 TABLET | Refills: 2 | Status: SHIPPED | OUTPATIENT
Start: 2023-11-16

## 2023-11-28 DIAGNOSIS — F41.1 GENERALIZED ANXIETY DISORDER: ICD-10-CM

## 2023-11-28 DIAGNOSIS — F33.1 MAJOR DEPRESSIVE DISORDER, RECURRENT EPISODE, MODERATE: Primary | ICD-10-CM

## 2023-11-28 RX ORDER — VENLAFAXINE 75 MG/1
75 TABLET ORAL DAILY
Qty: 14 TABLET | Refills: 0 | Status: SHIPPED | OUTPATIENT
Start: 2023-11-28

## 2023-11-28 RX ORDER — VENLAFAXINE 37.5 MG/1
TABLET ORAL
Qty: 14 TABLET | Refills: 0 | Status: SHIPPED | OUTPATIENT
Start: 2023-11-28

## 2023-11-28 RX ORDER — VILAZODONE HYDROCHLORIDE 10 MG/1
10 TABLET ORAL
Qty: 30 TABLET | Refills: 0 | Status: SHIPPED | OUTPATIENT
Start: 2023-11-28

## 2023-11-28 NOTE — PROGRESS NOTES
Spoke with patient regarding her Genesight results. Effexor is in the dignificant gene-drug interaction category for her, and she would like to switch to a different medication. Patient is going to start vilazodone and taper off Effexor.     Scripts sent in for Effexor 75mg daily for 2 weeks, then Effexor 37.5mg for 2 weeks and then stop medication.     Vilazodone 10mg sent in to take with dinner. If patient is doing ok in one month, but hasn't noticed any significant improvement, we can increase to 20mg daily.

## 2023-11-29 DIAGNOSIS — Z95.2 S/P MVR (MITRAL VALVE REPLACEMENT): ICD-10-CM

## 2023-11-29 DIAGNOSIS — Z79.01 LONG TERM (CURRENT) USE OF ANTICOAGULANTS: ICD-10-CM

## 2023-11-29 RX ORDER — WARFARIN SODIUM 4 MG/1
TABLET ORAL
Qty: 115 TABLET | Refills: 0 | Status: SHIPPED | OUTPATIENT
Start: 2023-11-29

## 2023-11-29 NOTE — TELEPHONE ENCOUNTER
Rx Refill Note  Requested Prescriptions     Pending Prescriptions Disp Refills    warfarin (COUMADIN) 4 MG tablet [Pharmacy Med Name: WARFARIN SOD 4MG TABLETS] 115 tablet 0     Sig: Take 1 1/2 tabs, by mouth, daily except 1 tab Sun, Tues and Thurs or as directed.    Last INR 10/30/23  Last office visit with prescribing clinician: 6/26/2023   Last telemedicine visit with prescribing clinician: Visit date not found   Next office visit with prescribing clinician: 12/26/2023                         Would you like a call back once the refill request has been completed: [] Yes [] No    If the office needs to give you a call back, can they leave a voicemail: [] Yes [] No    Tiarra Quijano RN  11/29/23, 16:34 EST

## 2023-12-20 ENCOUNTER — ANTICOAGULATION VISIT (OUTPATIENT)
Dept: CARDIOLOGY | Facility: CLINIC | Age: 48
End: 2023-12-20
Payer: COMMERCIAL

## 2023-12-20 VITALS
SYSTOLIC BLOOD PRESSURE: 138 MMHG | BODY MASS INDEX: 41.36 KG/M2 | DIASTOLIC BLOOD PRESSURE: 88 MMHG | HEART RATE: 115 BPM | WEIGHT: 272 LBS

## 2023-12-20 DIAGNOSIS — Z95.2 S/P MVR (MITRAL VALVE REPLACEMENT): Primary | ICD-10-CM

## 2023-12-20 LAB — INR PPP: 2.5 (ref 0.9–1.1)

## 2023-12-20 PROCEDURE — 85610 PROTHROMBIN TIME: CPT | Performed by: INTERNAL MEDICINE

## 2023-12-20 PROCEDURE — 36416 COLLJ CAPILLARY BLOOD SPEC: CPT | Performed by: INTERNAL MEDICINE

## 2024-01-03 DIAGNOSIS — F41.1 GENERALIZED ANXIETY DISORDER: ICD-10-CM

## 2024-01-03 DIAGNOSIS — F33.1 MAJOR DEPRESSIVE DISORDER, RECURRENT EPISODE, MODERATE: ICD-10-CM

## 2024-01-04 RX ORDER — VILAZODONE HYDROCHLORIDE 20 MG/1
20 TABLET ORAL
Qty: 30 TABLET | Refills: 1 | Status: SHIPPED | OUTPATIENT
Start: 2024-01-04

## 2024-01-08 ENCOUNTER — HOSPITAL ENCOUNTER (OUTPATIENT)
Dept: CT IMAGING | Facility: HOSPITAL | Age: 49
Discharge: HOME | End: 2024-01-08
Admitting: INTERNAL MEDICINE
Payer: COMMERCIAL

## 2024-01-08 DIAGNOSIS — R91.8 MULTIPLE NODULES OF LUNG: ICD-10-CM

## 2024-01-08 LAB
CREAT BLDA-MCNC: 0.9 MG/DL (ref 0.6–1.3)
EGFRCR SERPLBLD CKD-EPI 2021: 78 ML/MIN/1.73

## 2024-01-08 PROCEDURE — 82565 ASSAY OF CREATININE: CPT

## 2024-01-08 PROCEDURE — 25510000001 IOPAMIDOL PER 1 ML: Performed by: INTERNAL MEDICINE

## 2024-01-08 PROCEDURE — 71260 CT THORAX DX C+: CPT

## 2024-01-08 RX ADMIN — IOPAMIDOL 100 ML: 755 INJECTION, SOLUTION INTRAVENOUS at 15:42

## 2024-01-11 ENCOUNTER — OFFICE (OUTPATIENT)
Dept: URBAN - METROPOLITAN AREA CLINIC 66 | Facility: CLINIC | Age: 49
End: 2024-01-11

## 2024-01-11 VITALS
DIASTOLIC BLOOD PRESSURE: 84 MMHG | WEIGHT: 275 LBS | HEIGHT: 69 IN | HEART RATE: 101 BPM | SYSTOLIC BLOOD PRESSURE: 119 MMHG

## 2024-01-11 DIAGNOSIS — K21.9 GASTRO-ESOPHAGEAL REFLUX DISEASE WITHOUT ESOPHAGITIS: ICD-10-CM

## 2024-01-11 DIAGNOSIS — K91.5 POSTCHOLECYSTECTOMY SYNDROME: ICD-10-CM

## 2024-01-11 DIAGNOSIS — D50.0 IRON DEFICIENCY ANEMIA SECONDARY TO BLOOD LOSS (CHRONIC): ICD-10-CM

## 2024-01-11 PROCEDURE — 99213 OFFICE O/P EST LOW 20 MIN: CPT | Performed by: NURSE PRACTITIONER

## 2024-01-11 RX ORDER — DICYCLOMINE HYDROCHLORIDE 10 MG/1
40 CAPSULE ORAL
Qty: 60 | Refills: 5 | Status: ACTIVE
Start: 2023-05-01

## 2024-01-24 ENCOUNTER — ANTICOAGULATION VISIT (OUTPATIENT)
Dept: CARDIOLOGY | Facility: CLINIC | Age: 49
End: 2024-01-24
Payer: COMMERCIAL

## 2024-01-24 VITALS
WEIGHT: 269 LBS | SYSTOLIC BLOOD PRESSURE: 131 MMHG | BODY MASS INDEX: 40.9 KG/M2 | HEART RATE: 102 BPM | DIASTOLIC BLOOD PRESSURE: 85 MMHG

## 2024-01-24 DIAGNOSIS — Z95.2 S/P MVR (MITRAL VALVE REPLACEMENT): Primary | ICD-10-CM

## 2024-01-24 LAB — INR PPP: 2.4 (ref 0.9–1.1)

## 2024-01-24 PROCEDURE — 36416 COLLJ CAPILLARY BLOOD SPEC: CPT | Performed by: INTERNAL MEDICINE

## 2024-01-24 PROCEDURE — 85610 PROTHROMBIN TIME: CPT | Performed by: INTERNAL MEDICINE

## 2024-01-25 ENCOUNTER — TELEPHONE (OUTPATIENT)
Dept: CARDIOLOGY | Facility: CLINIC | Age: 49
End: 2024-01-25
Payer: COMMERCIAL

## 2024-01-25 NOTE — TELEPHONE ENCOUNTER
Pt was wanting to know if you would give her the ok to check INR's at home?If so, we will go ahead with her application.  Please advise. Thanks Mar
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
## 2024-02-05 ENCOUNTER — HOSPITAL ENCOUNTER (OUTPATIENT)
Dept: MAMMOGRAPHY | Facility: HOSPITAL | Age: 49
Discharge: HOME | End: 2024-02-05
Payer: COMMERCIAL

## 2024-02-05 ENCOUNTER — HOSPITAL ENCOUNTER (OUTPATIENT)
Dept: ULTRASOUND IMAGING | Facility: HOSPITAL | Age: 49
Discharge: HOME | End: 2024-02-05
Payer: COMMERCIAL

## 2024-02-05 DIAGNOSIS — N60.12 FIBROCYSTIC CHANGES OF LEFT BREAST: ICD-10-CM

## 2024-02-05 PROCEDURE — G0279 TOMOSYNTHESIS, MAMMO: HCPCS

## 2024-02-05 PROCEDURE — 77066 DX MAMMO INCL CAD BI: CPT

## 2024-02-06 ENCOUNTER — OFFICE VISIT (OUTPATIENT)
Dept: PSYCHIATRY | Facility: CLINIC | Age: 49
End: 2024-02-06
Payer: COMMERCIAL

## 2024-02-06 DIAGNOSIS — F41.1 GENERALIZED ANXIETY DISORDER: Chronic | ICD-10-CM

## 2024-02-06 DIAGNOSIS — F33.1 MAJOR DEPRESSIVE DISORDER, RECURRENT EPISODE, MODERATE: Primary | Chronic | ICD-10-CM

## 2024-02-06 DIAGNOSIS — F41.0 PANIC DISORDER: Chronic | ICD-10-CM

## 2024-02-06 DIAGNOSIS — F51.05 INSOMNIA DUE TO MENTAL CONDITION: Chronic | ICD-10-CM

## 2024-02-06 RX ORDER — VILAZODONE HYDROCHLORIDE 20 MG/1
20 TABLET ORAL
Qty: 30 TABLET | Refills: 2 | Status: SHIPPED | OUTPATIENT
Start: 2024-02-06

## 2024-02-06 RX ORDER — TRAZODONE HYDROCHLORIDE 100 MG/1
100 TABLET ORAL NIGHTLY
Qty: 30 TABLET | Refills: 2 | Status: SHIPPED | OUTPATIENT
Start: 2024-02-06

## 2024-02-06 RX ORDER — ALPRAZOLAM 0.25 MG/1
0.25 TABLET ORAL 2 TIMES DAILY PRN
Qty: 60 TABLET | Refills: 2 | Status: SHIPPED | OUTPATIENT
Start: 2024-02-06 | End: 2025-02-05

## 2024-02-06 RX ORDER — CETIRIZINE HYDROCHLORIDE 10 MG/1
10 TABLET ORAL DAILY
COMMUNITY

## 2024-02-06 RX ORDER — LAMOTRIGINE 200 MG/1
200 TABLET ORAL DAILY
Qty: 30 TABLET | Refills: 2 | Status: SHIPPED | OUTPATIENT
Start: 2024-02-06

## 2024-02-06 RX ORDER — AMOXICILLIN 500 MG/1
500 CAPSULE ORAL ONCE
COMMUNITY
Start: 2023-10-12

## 2024-02-06 NOTE — TELEPHONE ENCOUNTER
Called pt and she will check with insurance company to see which monitor service is preferred. Will start application when she returns call with info. Mar

## 2024-02-22 ENCOUNTER — ANTICOAGULATION VISIT (OUTPATIENT)
Dept: CARDIOLOGY | Facility: CLINIC | Age: 49
End: 2024-02-22
Payer: COMMERCIAL

## 2024-02-22 ENCOUNTER — OFFICE VISIT (OUTPATIENT)
Dept: CARDIOLOGY | Facility: CLINIC | Age: 49
End: 2024-02-22
Payer: COMMERCIAL

## 2024-02-22 VITALS
SYSTOLIC BLOOD PRESSURE: 126 MMHG | HEART RATE: 96 BPM | WEIGHT: 269 LBS | DIASTOLIC BLOOD PRESSURE: 85 MMHG | OXYGEN SATURATION: 94 % | HEIGHT: 68 IN | BODY MASS INDEX: 40.77 KG/M2

## 2024-02-22 VITALS
DIASTOLIC BLOOD PRESSURE: 91 MMHG | SYSTOLIC BLOOD PRESSURE: 154 MMHG | WEIGHT: 269 LBS | BODY MASS INDEX: 40.9 KG/M2 | HEART RATE: 100 BPM

## 2024-02-22 DIAGNOSIS — Z95.2 S/P MVR (MITRAL VALVE REPLACEMENT): Primary | ICD-10-CM

## 2024-02-22 DIAGNOSIS — I25.118 CORONARY ARTERY DISEASE OF NATIVE ARTERY OF NATIVE HEART WITH STABLE ANGINA PECTORIS: ICD-10-CM

## 2024-02-22 DIAGNOSIS — I10 PRIMARY HYPERTENSION: ICD-10-CM

## 2024-02-22 DIAGNOSIS — Z95.1 S/P CABG X 1: ICD-10-CM

## 2024-02-22 DIAGNOSIS — E78.00 PURE HYPERCHOLESTEROLEMIA: ICD-10-CM

## 2024-02-22 DIAGNOSIS — I34.0 NONRHEUMATIC MITRAL VALVE REGURGITATION: ICD-10-CM

## 2024-02-22 LAB — INR PPP: 1.8 (ref 0.9–1.1)

## 2024-02-22 PROCEDURE — 85610 PROTHROMBIN TIME: CPT | Performed by: INTERNAL MEDICINE

## 2024-02-22 PROCEDURE — 36416 COLLJ CAPILLARY BLOOD SPEC: CPT | Performed by: INTERNAL MEDICINE
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
## 2024-03-08 ENCOUNTER — ANTICOAGULATION VISIT (OUTPATIENT)
Dept: CARDIOLOGY | Facility: CLINIC | Age: 49
End: 2024-03-08
Payer: COMMERCIAL

## 2024-03-08 VITALS
WEIGHT: 269 LBS | BODY MASS INDEX: 40.9 KG/M2 | DIASTOLIC BLOOD PRESSURE: 91 MMHG | SYSTOLIC BLOOD PRESSURE: 137 MMHG | HEART RATE: 87 BPM

## 2024-03-08 DIAGNOSIS — Z95.2 S/P MVR (MITRAL VALVE REPLACEMENT): Primary | ICD-10-CM

## 2024-03-08 LAB — INR PPP: 4.7 (ref 2.5–3.5)

## 2024-03-08 PROCEDURE — 85610 PROTHROMBIN TIME: CPT | Performed by: INTERNAL MEDICINE

## 2024-03-08 PROCEDURE — 36416 COLLJ CAPILLARY BLOOD SPEC: CPT | Performed by: INTERNAL MEDICINE

## 2024-03-13 DIAGNOSIS — Z79.01 LONG TERM (CURRENT) USE OF ANTICOAGULANTS: ICD-10-CM

## 2024-03-13 DIAGNOSIS — Z95.2 S/P MVR (MITRAL VALVE REPLACEMENT): ICD-10-CM

## 2024-03-14 RX ORDER — WARFARIN SODIUM 4 MG/1
TABLET ORAL
Qty: 125 TABLET | Refills: 0 | Status: SHIPPED | OUTPATIENT
Start: 2024-03-14

## 2024-03-14 NOTE — TELEPHONE ENCOUNTER
Rx Refill Note  Requested Prescriptions     Pending Prescriptions Disp Refills    warfarin (COUMADIN) 4 MG tablet [Pharmacy Med Name: WARFARIN SOD 4MG TABLETS] 125 tablet 0     Sig: Take 1 1/2 tabs, by mouth, daily except 1 tab on Tues and Thurs or as directed.    Last INR 3/8/24  Last office visit with prescribing clinician: 2/22/2024   Last telemedicine visit with prescribing clinician: Visit date not found   Next office visit with prescribing clinician: 8/22/2024                         Would you like a call back once the refill request has been completed: [] Yes [] No    If the office needs to give you a call back, can they leave a voicemail: [] Yes [] No    Tiarra Quijano RN  03/14/24, 09:31 EDT

## 2024-03-22 ENCOUNTER — ANTICOAGULATION VISIT (OUTPATIENT)
Dept: CARDIOLOGY | Facility: CLINIC | Age: 49
End: 2024-03-22
Payer: COMMERCIAL

## 2024-03-22 VITALS
DIASTOLIC BLOOD PRESSURE: 94 MMHG | HEART RATE: 88 BPM | WEIGHT: 267 LBS | SYSTOLIC BLOOD PRESSURE: 145 MMHG | BODY MASS INDEX: 40.6 KG/M2

## 2024-03-22 DIAGNOSIS — Z95.2 S/P MVR (MITRAL VALVE REPLACEMENT): Primary | ICD-10-CM

## 2024-03-22 LAB — INR PPP: 4.1 (ref 0.9–1.1)

## 2024-03-22 PROCEDURE — 36416 COLLJ CAPILLARY BLOOD SPEC: CPT | Performed by: INTERNAL MEDICINE

## 2024-03-22 PROCEDURE — 85610 PROTHROMBIN TIME: CPT | Performed by: INTERNAL MEDICINE

## 2024-03-22 NOTE — PROGRESS NOTES
Pts INR had come down some, however was still running high at 4.1. She will only take 4 mgs today, then she will reduce dosage to 4 mgs Sun, Tues and Thurs with 6 mgs all other days. Will recheck in a month. Mar

## 2024-03-28 ENCOUNTER — TELEPHONE (OUTPATIENT)
Dept: PSYCHIATRY | Facility: CLINIC | Age: 49
End: 2024-03-28
Payer: COMMERCIAL

## 2024-03-28 DIAGNOSIS — F33.1 MAJOR DEPRESSIVE DISORDER, RECURRENT EPISODE, MODERATE: Chronic | ICD-10-CM

## 2024-03-28 DIAGNOSIS — F41.1 GENERALIZED ANXIETY DISORDER: Chronic | ICD-10-CM

## 2024-03-29 RX ORDER — VILAZODONE HYDROCHLORIDE 40 MG/1
40 TABLET ORAL
Qty: 90 TABLET | Refills: 1 | Status: SHIPPED | OUTPATIENT
Start: 2024-03-29

## 2024-04-19 ENCOUNTER — TRANSCRIBE ORDERS (OUTPATIENT)
Dept: ADMINISTRATIVE | Facility: HOSPITAL | Age: 49
End: 2024-04-19
Payer: COMMERCIAL

## 2024-04-19 DIAGNOSIS — R91.1 SOLITARY PULMONARY NODULE: Primary | ICD-10-CM

## 2024-04-24 ENCOUNTER — ANTICOAGULATION VISIT (OUTPATIENT)
Dept: CARDIOLOGY | Facility: CLINIC | Age: 49
End: 2024-04-24
Payer: COMMERCIAL

## 2024-04-24 VITALS
BODY MASS INDEX: 40.29 KG/M2 | WEIGHT: 265 LBS | HEART RATE: 87 BPM | SYSTOLIC BLOOD PRESSURE: 119 MMHG | DIASTOLIC BLOOD PRESSURE: 88 MMHG

## 2024-04-24 DIAGNOSIS — Z95.2 S/P MVR (MITRAL VALVE REPLACEMENT): Primary | ICD-10-CM

## 2024-04-24 LAB — INR PPP: 3.6 (ref 2.5–3.5)

## 2024-04-24 PROCEDURE — 36416 COLLJ CAPILLARY BLOOD SPEC: CPT | Performed by: INTERNAL MEDICINE

## 2024-04-24 PROCEDURE — 85610 PROTHROMBIN TIME: CPT | Performed by: INTERNAL MEDICINE

## 2024-05-01 ENCOUNTER — TELEPHONE (OUTPATIENT)
Dept: BARIATRICS/WEIGHT MGMT | Facility: CLINIC | Age: 49
End: 2024-05-01
Payer: COMMERCIAL

## 2024-05-08 ENCOUNTER — OFFICE VISIT (OUTPATIENT)
Dept: PSYCHIATRY | Facility: CLINIC | Age: 49
End: 2024-05-08
Payer: COMMERCIAL

## 2024-05-08 DIAGNOSIS — F41.1 GENERALIZED ANXIETY DISORDER: Chronic | ICD-10-CM

## 2024-05-08 DIAGNOSIS — F41.0 PANIC DISORDER: Chronic | ICD-10-CM

## 2024-05-08 DIAGNOSIS — F33.1 MAJOR DEPRESSIVE DISORDER, RECURRENT EPISODE, MODERATE: Primary | Chronic | ICD-10-CM

## 2024-05-08 DIAGNOSIS — F51.05 INSOMNIA DUE TO MENTAL CONDITION: Chronic | ICD-10-CM

## 2024-05-08 RX ORDER — ALPRAZOLAM 0.25 MG/1
0.25 TABLET ORAL 2 TIMES DAILY PRN
Qty: 60 TABLET | Refills: 2 | Status: SHIPPED | OUTPATIENT
Start: 2024-05-08 | End: 2025-05-08

## 2024-05-08 RX ORDER — TRAZODONE HYDROCHLORIDE 100 MG/1
100 TABLET ORAL NIGHTLY
Qty: 90 TABLET | Refills: 3 | Status: SHIPPED | OUTPATIENT
Start: 2024-05-08

## 2024-05-08 RX ORDER — LAMOTRIGINE 25 MG/1
TABLET ORAL
Qty: 91 TABLET | Refills: 0 | Status: SHIPPED | OUTPATIENT
Start: 2024-05-08 | End: 2024-06-05

## 2024-05-20 ENCOUNTER — TELEPHONE (OUTPATIENT)
Dept: BARIATRICS/WEIGHT MGMT | Facility: CLINIC | Age: 49
End: 2024-05-20
Payer: COMMERCIAL

## 2024-05-20 NOTE — TELEPHONE ENCOUNTER
Altagracia called, states has received our KIRT packet. Original SX with Dr. Hayward, does not want to cross the bridge, is hoping to come to [facility] location. Patient is aware needs to complete KIRT packet and submit to our office for review to verify that we can accept patient in our program before proceeding with an appointment.

## 2024-05-24 ENCOUNTER — ANTICOAGULATION VISIT (OUTPATIENT)
Dept: CARDIOLOGY | Facility: CLINIC | Age: 49
End: 2024-05-24
Payer: COMMERCIAL

## 2024-05-24 VITALS
DIASTOLIC BLOOD PRESSURE: 90 MMHG | BODY MASS INDEX: 39.99 KG/M2 | SYSTOLIC BLOOD PRESSURE: 140 MMHG | WEIGHT: 263 LBS | HEART RATE: 86 BPM

## 2024-05-24 DIAGNOSIS — Z95.2 S/P MVR (MITRAL VALVE REPLACEMENT): Primary | ICD-10-CM

## 2024-05-24 LAB — INR PPP: 2.7 (ref 2.5–3.5)

## 2024-05-24 PROCEDURE — 36416 COLLJ CAPILLARY BLOOD SPEC: CPT | Performed by: INTERNAL MEDICINE

## 2024-05-24 PROCEDURE — 85610 PROTHROMBIN TIME: CPT | Performed by: INTERNAL MEDICINE

## 2024-05-30 ENCOUNTER — TELEPHONE (OUTPATIENT)
Dept: CARDIOLOGY | Facility: CLINIC | Age: 49
End: 2024-05-30
Payer: COMMERCIAL

## 2024-05-30 NOTE — TELEPHONE ENCOUNTER
Hub staff attempted to follow warm transfer process and was unsuccessful     Caller: Altagracia Tiwari A    Relationship to patient: Self    Best call back number: [phone]    Patient is needing: PT IS CALLING TO SEE IF SHE NEEDS TO TAKE MORE OF WARFARIN BECAUSE SHE MISSED IT FOR 2 DAYS. PT IS VERY NERVOUS AND WOULD LIKE A CALL BACK

## 2024-05-31 ENCOUNTER — ANTICOAGULATION VISIT (OUTPATIENT)
Dept: CARDIOLOGY | Facility: CLINIC | Age: 49
End: 2024-05-31
Payer: COMMERCIAL

## 2024-05-31 VITALS — HEART RATE: 99 BPM | SYSTOLIC BLOOD PRESSURE: 143 MMHG | DIASTOLIC BLOOD PRESSURE: 92 MMHG

## 2024-05-31 DIAGNOSIS — Z95.2 S/P MVR (MITRAL VALVE REPLACEMENT): Primary | ICD-10-CM

## 2024-05-31 LAB — INR PPP: 1.6 (ref 2–3)

## 2024-05-31 PROCEDURE — 36416 COLLJ CAPILLARY BLOOD SPEC: CPT | Performed by: INTERNAL MEDICINE

## 2024-05-31 PROCEDURE — 85610 PROTHROMBIN TIME: CPT | Performed by: INTERNAL MEDICINE

## 2024-05-31 NOTE — PROGRESS NOTES
With missed tablets pt increased yesterdays dose of warfarin from 4mg to 8mg. INR subtherapeutic today at 1.6 will increase INR today and tomorrow to 6mg (22% increase for the week) and Recheck INR on Monday apLPN

## 2024-06-03 ENCOUNTER — ANTICOAGULATION VISIT (OUTPATIENT)
Dept: CARDIOLOGY | Facility: CLINIC | Age: 49
End: 2024-06-03
Payer: COMMERCIAL

## 2024-06-03 VITALS
HEART RATE: 92 BPM | WEIGHT: 263 LBS | SYSTOLIC BLOOD PRESSURE: 135 MMHG | BODY MASS INDEX: 39.99 KG/M2 | DIASTOLIC BLOOD PRESSURE: 99 MMHG

## 2024-06-03 DIAGNOSIS — Z95.2 S/P MVR (MITRAL VALVE REPLACEMENT): Primary | ICD-10-CM

## 2024-06-03 LAB — INR PPP: 2.7 (ref 2.5–3.5)

## 2024-06-03 PROCEDURE — 36416 COLLJ CAPILLARY BLOOD SPEC: CPT | Performed by: INTERNAL MEDICINE

## 2024-06-03 PROCEDURE — 85610 PROTHROMBIN TIME: CPT | Performed by: INTERNAL MEDICINE

## 2024-06-04 ENCOUNTER — TELEPHONE (OUTPATIENT)
Dept: CARDIOLOGY | Facility: CLINIC | Age: 49
End: 2024-06-04
Payer: COMMERCIAL

## 2024-06-04 NOTE — TELEPHONE ENCOUNTER
Pt sent message to report follow up from yesterdays elevated blood pressure. Blood pressure was improved 131/81 pulse 105.

## 2024-06-07 DIAGNOSIS — F33.1 MAJOR DEPRESSIVE DISORDER, RECURRENT EPISODE, MODERATE: Chronic | ICD-10-CM

## 2024-06-07 RX ORDER — LAMOTRIGINE 200 MG/1
200 TABLET ORAL DAILY
Qty: 30 TABLET | Refills: 2 | Status: SHIPPED | OUTPATIENT
Start: 2024-06-07

## 2024-06-07 NOTE — TELEPHONE ENCOUNTER
Rx Refill Note  Requested Prescriptions     Pending Prescriptions Disp Refills    lamoTRIgine (LaMICtal) 200 MG tablet [Pharmacy Med Name: LAMOTRIGINE 200MG TABLETS] 30 tablet 2     Sig: TAKE 1 TABLET BY MOUTH DAILY      Last office visit with prescribing clinician: 5/8/2024   Last telemedicine visit with prescribing clinician: Visit date not found   Next office visit with prescribing clinician: 8/8/2024   Office Visit with Tiarra Leyva APRN (05/08/2024)                       Would you like a call back once the refill request has been completed: [] Yes [] No    If the office needs to give you a call back, can they leave a voicemail: [] Yes [] No    Altagracia De La Vega MA  06/07/24, 13:28 EDT

## 2024-06-12 ENCOUNTER — TELEPHONE (OUTPATIENT)
Dept: PSYCHIATRY | Facility: CLINIC | Age: 49
End: 2024-06-12
Payer: COMMERCIAL

## 2024-06-12 DIAGNOSIS — Z95.2 S/P MVR (MITRAL VALVE REPLACEMENT): ICD-10-CM

## 2024-06-12 DIAGNOSIS — Z79.01 LONG TERM (CURRENT) USE OF ANTICOAGULANTS: ICD-10-CM

## 2024-06-12 RX ORDER — WARFARIN SODIUM 4 MG/1
TABLET ORAL
Qty: 110 TABLET | Refills: 0 | Status: SHIPPED | OUTPATIENT
Start: 2024-06-12

## 2024-06-12 RX ORDER — LAMOTRIGINE 25 MG/1
TABLET ORAL
Qty: 42 TABLET | Refills: 0 | Status: SHIPPED | OUTPATIENT
Start: 2024-06-12 | End: 2024-07-09

## 2024-06-12 NOTE — TELEPHONE ENCOUNTER
Rx Refill Note  Requested Prescriptions     Pending Prescriptions Disp Refills    warfarin (COUMADIN) 4 MG tablet [Pharmacy Med Name: WARFARIN SOD 4MG TABLETS] 110 tablet 0     Sig: Take 1 1/2 tabs, by mouth, daily except 1 tab on Sun, Tues and Thurs or as directed.    Last INR 6/3/24  Last office visit with prescribing clinician: 2/22/2024   Last telemedicine visit with prescribing clinician: Visit date not found   Next office visit with prescribing clinician: 8/22/2024                         Would you like a call back once the refill request has been completed: [] Yes [] No    If the office needs to give you a call back, can they leave a voicemail: [] Yes [] No    Tiarra Quijano RN  06/12/24, 16:55 EDT

## 2024-06-12 NOTE — TELEPHONE ENCOUNTER
Patient called last week to let us know she wanted to go back on the Lamictal.  She is having small things start to aggravate her and she can feel the anger building.  200 mg got sent in but no taper dose.  I called her and told her to mot take the 200 mg right away that she had to taper back onto it. She stated she had started it yet.    She has been off of the Lamictal for 3 weeks now.

## 2024-06-24 ENCOUNTER — ANTICOAGULATION VISIT (OUTPATIENT)
Dept: CARDIOLOGY | Facility: CLINIC | Age: 49
End: 2024-06-24
Payer: COMMERCIAL

## 2024-06-24 VITALS
WEIGHT: 267 LBS | BODY MASS INDEX: 40.6 KG/M2 | DIASTOLIC BLOOD PRESSURE: 92 MMHG | SYSTOLIC BLOOD PRESSURE: 129 MMHG | HEART RATE: 83 BPM

## 2024-06-24 DIAGNOSIS — Z95.2 S/P MVR (MITRAL VALVE REPLACEMENT): Primary | ICD-10-CM

## 2024-06-24 LAB — INR PPP: 3.3 (ref 2.5–3.5)

## 2024-06-24 PROCEDURE — 36416 COLLJ CAPILLARY BLOOD SPEC: CPT | Performed by: INTERNAL MEDICINE

## 2024-06-24 PROCEDURE — 85610 PROTHROMBIN TIME: CPT | Performed by: INTERNAL MEDICINE

## 2024-07-01 ENCOUNTER — HOSPITAL ENCOUNTER (OUTPATIENT)
Dept: CT IMAGING | Facility: HOSPITAL | Age: 49
Discharge: HOME | End: 2024-07-01
Admitting: INTERNAL MEDICINE
Payer: COMMERCIAL

## 2024-07-01 DIAGNOSIS — R91.1 SOLITARY PULMONARY NODULE: ICD-10-CM

## 2024-07-01 PROCEDURE — 71250 CT THORAX DX C-: CPT

## 2024-07-03 DIAGNOSIS — F51.05 INSOMNIA DUE TO MENTAL CONDITION: Chronic | ICD-10-CM

## 2024-07-03 RX ORDER — TRAZODONE HYDROCHLORIDE 100 MG/1
100 TABLET ORAL NIGHTLY
Qty: 90 TABLET | Refills: 1 | Status: SHIPPED | OUTPATIENT
Start: 2024-07-03

## 2024-07-11 ENCOUNTER — TELEPHONE (OUTPATIENT)
Dept: CARDIOLOGY | Facility: CLINIC | Age: 49
End: 2024-07-11
Payer: COMMERCIAL

## 2024-07-11 NOTE — TELEPHONE ENCOUNTER
Called spoke with Sandra at [facility] advised due to liability purposes and our practice not using the same model I cannot wave the training on a home monitoring device.

## 2024-07-22 ENCOUNTER — ANTICOAGULATION VISIT (OUTPATIENT)
Dept: CARDIOLOGY | Facility: CLINIC | Age: 49
End: 2024-07-22
Payer: COMMERCIAL

## 2024-07-22 DIAGNOSIS — Z95.2 S/P MVR (MITRAL VALVE REPLACEMENT): Primary | ICD-10-CM

## 2024-07-22 LAB — INR PPP: 4.3

## 2024-07-22 NOTE — PROGRESS NOTES
Pt called in home INR of 4.3. This was her first home INR. Pt reports that she recently took antibiotics. Advised her to hold Warfarin today, then decrease her dosage to 6 mgs Mon, Weds and Fri with 4 mgs all other days and recheck in a week. Mar

## 2024-07-29 LAB — INR PPP: 2.4

## 2024-07-30 ENCOUNTER — ANTICOAGULATION VISIT (OUTPATIENT)
Dept: CARDIOLOGY | Facility: CLINIC | Age: 49
End: 2024-07-30
Payer: COMMERCIAL

## 2024-07-30 DIAGNOSIS — Z95.2 S/P MVR (MITRAL VALVE REPLACEMENT): Primary | ICD-10-CM

## 2024-07-30 NOTE — PROGRESS NOTES
Pts INR was only slightly low at 2.4. Will take 6 mgs today for a boost, then resume current dosage and recheck in a week. [email or web address]

## 2024-08-06 ENCOUNTER — PATIENT MESSAGE (OUTPATIENT)
Dept: CARDIOLOGY | Facility: CLINIC | Age: 49
End: 2024-08-06
Payer: COMMERCIAL

## 2024-08-06 ENCOUNTER — ANTICOAGULATION VISIT (OUTPATIENT)
Dept: CARDIOLOGY | Facility: CLINIC | Age: 49
End: 2024-08-06
Payer: COMMERCIAL

## 2024-08-06 DIAGNOSIS — Z95.2 S/P MVR (MITRAL VALVE REPLACEMENT): Primary | ICD-10-CM

## 2024-08-06 LAB — INR PPP: 3.7

## 2024-08-06 NOTE — TELEPHONE ENCOUNTER
From: Altagracia Tiwari  To: Barrie Burns  Sent: 8/6/2024 9:46 AM EDT  Subject: INR 3.7    Good morning, my INR as yesterday was 3.7. Do I need to make any adjustments to my regimen? Thanks, Altagracia

## 2024-08-08 ENCOUNTER — OFFICE VISIT (OUTPATIENT)
Dept: PSYCHIATRY | Facility: CLINIC | Age: 49
End: 2024-08-08
Payer: COMMERCIAL

## 2024-08-08 DIAGNOSIS — F41.1 GENERALIZED ANXIETY DISORDER: Chronic | ICD-10-CM

## 2024-08-08 DIAGNOSIS — F41.0 PANIC DISORDER: Chronic | ICD-10-CM

## 2024-08-08 DIAGNOSIS — F33.1 MAJOR DEPRESSIVE DISORDER, RECURRENT EPISODE, MODERATE: Primary | Chronic | ICD-10-CM

## 2024-08-08 RX ORDER — GALCANEZUMAB 120 MG/ML
120 INJECTION, SOLUTION SUBCUTANEOUS
COMMUNITY
Start: 2024-06-11

## 2024-08-08 RX ORDER — RIMEGEPANT SULFATE 75 MG/75MG
75 TABLET, ORALLY DISINTEGRATING ORAL AS NEEDED
COMMUNITY
Start: 2024-05-16

## 2024-08-08 RX ORDER — LAMOTRIGINE 100 MG/1
100 TABLET ORAL DAILY
Qty: 90 TABLET | Refills: 3 | Status: SHIPPED | OUTPATIENT
Start: 2024-08-08

## 2024-08-08 RX ORDER — GOLIMUMAB 50 MG/.5ML
50 INJECTION, SOLUTION SUBCUTANEOUS
COMMUNITY

## 2024-08-08 RX ORDER — VILAZODONE HYDROCHLORIDE 40 MG/1
40 TABLET ORAL
Qty: 90 TABLET | Refills: 3 | Status: SHIPPED | OUTPATIENT
Start: 2024-08-08

## 2024-08-08 RX ORDER — ALPRAZOLAM 0.25 MG/1
0.25 TABLET ORAL 2 TIMES DAILY PRN
Qty: 60 TABLET | Refills: 2 | Status: SHIPPED | OUTPATIENT
Start: 2024-08-08 | End: 2025-08-08
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
## 2024-08-12 ENCOUNTER — ANTICOAGULATION VISIT (OUTPATIENT)
Dept: CARDIOLOGY | Facility: CLINIC | Age: 49
End: 2024-08-12
Payer: COMMERCIAL

## 2024-08-12 DIAGNOSIS — Z95.2 S/P MVR (MITRAL VALVE REPLACEMENT): Primary | ICD-10-CM

## 2024-08-12 LAB — INR PPP: 3.7

## 2024-08-13 ENCOUNTER — TELEPHONE (OUTPATIENT)
Dept: CARDIOLOGY | Facility: CLINIC | Age: 49
End: 2024-08-13
Payer: COMMERCIAL

## 2024-08-13 NOTE — TELEPHONE ENCOUNTER
Hub staff attempted to follow warm transfer process and was unsuccessful     Caller: Altagracia Tiwari A    Relationship to patient: Self    Best call back number: [phone]    Patient is needing: PT RETURNING CALL FROM [facility]

## 2024-08-16 ENCOUNTER — TELEPHONE (OUTPATIENT)
Dept: CARDIOLOGY | Facility: CLINIC | Age: 49
End: 2024-08-16
Payer: COMMERCIAL

## 2024-08-16 NOTE — TELEPHONE ENCOUNTER
Patient MyChart Message-      I have a question related to my INR & appt. Is it possible to cancel the 8/22 3p appt for INR since I will take it on 8/19 with home meter. I also have a meeting at work for 2:30 - 2:45, and don't want to risk being late. I still would be able to see Anai at 3:30.       Contacted patient cancelled old INR appt. Pt will monitor INR with home monitoring system.

## 2024-08-20 ENCOUNTER — ANTICOAGULATION VISIT (OUTPATIENT)
Dept: CARDIOLOGY | Facility: CLINIC | Age: 49
End: 2024-08-20
Payer: COMMERCIAL

## 2024-08-20 DIAGNOSIS — Z95.2 S/P MVR (MITRAL VALVE REPLACEMENT): ICD-10-CM

## 2024-08-20 DIAGNOSIS — Z95.2 S/P MVR (MITRAL VALVE REPLACEMENT): Primary | ICD-10-CM

## 2024-08-20 DIAGNOSIS — Z79.01 LONG TERM (CURRENT) USE OF ANTICOAGULANTS: ICD-10-CM

## 2024-08-20 LAB — INR PPP: 3.7

## 2024-08-20 RX ORDER — WARFARIN SODIUM 4 MG/1
TABLET ORAL
Qty: 110 TABLET | Refills: 0 | Status: SHIPPED | OUTPATIENT
Start: 2024-08-20
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
## 2024-08-20 NOTE — PROGRESS NOTES
Called patient no answer. Pikhub message sent to patient. See Below       Good Afternoon Altagracia,      I agree with only 4 mg yesterday. I would take only 2 mg of warfarin today instead of 4 mg to try and reduce the INR at this time. Then Resume 4mg daily and recheck INR next week. Please call me or message me if you have any other questions or concerns.      Have a Great Evening!     Charlee

## 2024-08-22 ENCOUNTER — OFFICE VISIT (OUTPATIENT)
Dept: CARDIOLOGY | Facility: CLINIC | Age: 49
End: 2024-08-22
Payer: COMMERCIAL

## 2024-08-22 ENCOUNTER — ANTICOAGULATION VISIT (OUTPATIENT)
Dept: CARDIOLOGY | Facility: CLINIC | Age: 49
End: 2024-08-22
Payer: COMMERCIAL

## 2024-08-22 VITALS
HEIGHT: 68 IN | OXYGEN SATURATION: 94 % | DIASTOLIC BLOOD PRESSURE: 95 MMHG | BODY MASS INDEX: 40.62 KG/M2 | WEIGHT: 268 LBS | SYSTOLIC BLOOD PRESSURE: 131 MMHG | HEART RATE: 106 BPM

## 2024-08-22 VITALS
SYSTOLIC BLOOD PRESSURE: 131 MMHG | WEIGHT: 268 LBS | DIASTOLIC BLOOD PRESSURE: 95 MMHG | BODY MASS INDEX: 40.75 KG/M2 | HEART RATE: 106 BPM

## 2024-08-22 DIAGNOSIS — Z95.2 S/P MVR (MITRAL VALVE REPLACEMENT): Primary | ICD-10-CM

## 2024-08-22 DIAGNOSIS — Z95.1 S/P CABG X 1: ICD-10-CM

## 2024-08-22 DIAGNOSIS — I25.118 CORONARY ARTERY DISEASE OF NATIVE ARTERY OF NATIVE HEART WITH STABLE ANGINA PECTORIS: Primary | ICD-10-CM

## 2024-08-22 DIAGNOSIS — I34.0 NONRHEUMATIC MITRAL VALVE REGURGITATION: ICD-10-CM

## 2024-08-22 DIAGNOSIS — Z95.2 S/P MVR (MITRAL VALVE REPLACEMENT): ICD-10-CM

## 2024-08-22 DIAGNOSIS — E66.01 CLASS 3 SEVERE OBESITY DUE TO EXCESS CALORIES WITH SERIOUS COMORBIDITY AND BODY MASS INDEX (BMI) OF 40.0 TO 44.9 IN ADULT: ICD-10-CM

## 2024-08-22 LAB
INR PPP: 2.4 (ref 0.9–1.1)
INR PPP: 2.7

## 2024-08-22 PROCEDURE — 99213 OFFICE O/P EST LOW 20 MIN: CPT

## 2024-08-22 PROCEDURE — 36416 COLLJ CAPILLARY BLOOD SPEC: CPT | Performed by: INTERNAL MEDICINE

## 2024-08-22 PROCEDURE — 85610 PROTHROMBIN TIME: CPT | Performed by: INTERNAL MEDICINE

## 2024-08-23 ENCOUNTER — ANTICOAGULATION VISIT (OUTPATIENT)
Dept: CARDIOLOGY | Facility: CLINIC | Age: 49
End: 2024-08-23
Payer: COMMERCIAL

## 2024-08-23 DIAGNOSIS — Z95.2 S/P MVR (MITRAL VALVE REPLACEMENT): Primary | ICD-10-CM

## 2024-08-23 NOTE — PROGRESS NOTES
Pt had INR in office today and had a 2.4 result, advised her to recheck at home with monitor. She reports a 2.7 result from home. Pt to continue current plan and recheck as contracted. Mar

## 2024-08-26 ENCOUNTER — PATIENT MESSAGE (OUTPATIENT)
Dept: CARDIOLOGY | Facility: CLINIC | Age: 49
End: 2024-08-26
Payer: COMMERCIAL

## 2024-08-26 ENCOUNTER — ANTICOAGULATION VISIT (OUTPATIENT)
Dept: CARDIOLOGY | Facility: CLINIC | Age: 49
End: 2024-08-26
Payer: COMMERCIAL

## 2024-08-26 DIAGNOSIS — Z95.2 S/P MVR (MITRAL VALVE REPLACEMENT): Primary | ICD-10-CM

## 2024-08-26 LAB — INR PPP: 2.5

## 2024-09-03 ENCOUNTER — PATIENT MESSAGE (OUTPATIENT)
Dept: CARDIOLOGY | Facility: CLINIC | Age: 49
End: 2024-09-03
Payer: COMMERCIAL

## 2024-09-03 ENCOUNTER — ANTICOAGULATION VISIT (OUTPATIENT)
Dept: CARDIOLOGY | Facility: CLINIC | Age: 49
End: 2024-09-03
Payer: COMMERCIAL

## 2024-09-03 DIAGNOSIS — Z95.2 S/P MVR (MITRAL VALVE REPLACEMENT): Primary | ICD-10-CM

## 2024-09-03 LAB — INR PPP: 2

## 2024-09-03 NOTE — PROGRESS NOTES
CARLOS MESSAGE-  Good morning,  I took my INR and it was 2.0.  Please let me know what the recommendation is for changes.  Altagracia      9/3/24  4:32 PM  Good Afternoon Altagracia,      I did get your result. I suggest taking 6mg of warfarin today and then resuming with warfarin- 6 mg on Sundays and 4 mg all other days and recheck INR next week. Please call me or message me if you have any other concerns. Have a good evening.      Charlee

## 2024-09-05 DIAGNOSIS — I10 HYPERTENSION, UNSPECIFIED TYPE: ICD-10-CM

## 2024-09-05 RX ORDER — AMLODIPINE BESYLATE 5 MG/1
5 TABLET ORAL DAILY
Qty: 90 TABLET | Refills: 3 | Status: SHIPPED | OUTPATIENT
Start: 2024-09-05

## 2024-09-05 NOTE — TELEPHONE ENCOUNTER
Rx Refill Note  Requested Prescriptions     Pending Prescriptions Disp Refills    amLODIPine (NORVASC) 5 MG tablet [Pharmacy Med Name: AMLODIPINE BESYLATE 5MG TABLETS] 90 tablet 3     Sig: TAKE 1 TABLET BY MOUTH DAILY      Last office visit with prescribing clinician: 2/22/2024   Last telemedicine visit with prescribing clinician: Visit date not found   Next office visit with prescribing clinician: 2/27/2025                         Would you like a call back once the refill request has been completed: [] Yes [] No    If the office needs to give you a call back, can they leave a voicemail: [] Yes [] No    Beth Chase MA  09/05/24, 09:36 EDT

## 2024-09-10 ENCOUNTER — ANTICOAGULATION VISIT (OUTPATIENT)
Dept: CARDIOLOGY | Facility: CLINIC | Age: 49
End: 2024-09-10
Payer: COMMERCIAL

## 2024-09-10 DIAGNOSIS — Z95.2 S/P MVR (MITRAL VALVE REPLACEMENT): Primary | ICD-10-CM

## 2024-09-10 LAB — INR PPP: 2.3

## 2024-09-16 ENCOUNTER — ANTICOAGULATION VISIT (OUTPATIENT)
Dept: CARDIOLOGY | Facility: CLINIC | Age: 49
End: 2024-09-16
Payer: COMMERCIAL

## 2024-09-16 DIAGNOSIS — Z95.2 S/P MVR (MITRAL VALVE REPLACEMENT): Primary | ICD-10-CM

## 2024-09-16 LAB — INR PPP: 2.8

## 2024-09-23 ENCOUNTER — ANTICOAGULATION VISIT (OUTPATIENT)
Dept: CARDIOLOGY | Facility: CLINIC | Age: 49
End: 2024-09-23
Payer: COMMERCIAL

## 2024-09-23 DIAGNOSIS — Z95.2 S/P MVR (MITRAL VALVE REPLACEMENT): Primary | ICD-10-CM

## 2024-09-23 LAB — INR PPP: 3

## 2024-09-26 ENCOUNTER — TELEPHONE (OUTPATIENT)
Dept: CARDIOLOGY | Facility: CLINIC | Age: 49
End: 2024-09-26
Payer: COMMERCIAL

## 2024-09-30 ENCOUNTER — ANTICOAGULATION VISIT (OUTPATIENT)
Dept: CARDIOLOGY | Facility: CLINIC | Age: 49
End: 2024-09-30
Payer: COMMERCIAL

## 2024-09-30 DIAGNOSIS — Z95.2 S/P MVR (MITRAL VALVE REPLACEMENT): Primary | ICD-10-CM

## 2024-09-30 LAB — INR PPP: 2.7

## 2024-10-05 DIAGNOSIS — F33.1 MAJOR DEPRESSIVE DISORDER, RECURRENT EPISODE, MODERATE: Chronic | ICD-10-CM

## 2024-10-07 ENCOUNTER — ANTICOAGULATION VISIT (OUTPATIENT)
Dept: CARDIOLOGY | Facility: CLINIC | Age: 49
End: 2024-10-07
Payer: COMMERCIAL

## 2024-10-07 DIAGNOSIS — Z95.2 S/P MVR (MITRAL VALVE REPLACEMENT): Primary | ICD-10-CM

## 2024-10-07 LAB — INR PPP: 2.7

## 2024-10-07 RX ORDER — LAMOTRIGINE 200 MG/1
200 TABLET ORAL DAILY
Qty: 30 TABLET | Refills: 2 | OUTPATIENT
Start: 2024-10-07

## 2024-10-14 ENCOUNTER — TELEPHONE (OUTPATIENT)
Dept: CARDIOLOGY | Facility: CLINIC | Age: 49
End: 2024-10-14
Payer: COMMERCIAL

## 2024-10-14 NOTE — TELEPHONE ENCOUNTER
Spoke to Marsha at [facility]/Dr. Sullivan office to request lipid panel results. Marsha took the fax number and will be faxing results to our office.

## 2024-10-15 ENCOUNTER — ANTICOAGULATION VISIT (OUTPATIENT)
Dept: CARDIOLOGY | Facility: CLINIC | Age: 49
End: 2024-10-15
Payer: COMMERCIAL

## 2024-10-15 DIAGNOSIS — Z95.2 S/P MVR (MITRAL VALVE REPLACEMENT): Primary | ICD-10-CM

## 2024-10-15 LAB — INR PPP: 2.9

## 2024-10-22 LAB — INR PPP: 2.7

## 2024-10-23 ENCOUNTER — ANTICOAGULATION VISIT (OUTPATIENT)
Dept: CARDIOLOGY | Facility: CLINIC | Age: 49
End: 2024-10-23
Payer: COMMERCIAL

## 2024-10-23 DIAGNOSIS — Z95.2 S/P MVR (MITRAL VALVE REPLACEMENT): Primary | ICD-10-CM

## 2024-10-23 DIAGNOSIS — Z79.01 LONG TERM (CURRENT) USE OF ANTICOAGULANTS: ICD-10-CM

## 2024-10-23 DIAGNOSIS — Z95.2 S/P MVR (MITRAL VALVE REPLACEMENT): ICD-10-CM

## 2024-10-23 RX ORDER — WARFARIN SODIUM 4 MG/1
TABLET ORAL
Qty: 110 TABLET | Refills: 0 | Status: SHIPPED | OUTPATIENT
Start: 2024-10-23

## 2024-10-23 NOTE — TELEPHONE ENCOUNTER
Rx Refill Note  Requested Prescriptions     Pending Prescriptions Disp Refills    warfarin (COUMADIN) 4 MG tablet 110 tablet 0     Sig: Take 1 tab, by mouth, daily except 1 1/2 tabs on Mon and Fri or as directed.      Last office visit with prescribing clinician: 2/22/2024   Last telemedicine visit with prescribing clinician: Visit date not found   Next office visit with prescribing clinician: 2/27/2025       Anticoagulation Visit with Barrie Burns MD (10/23/2024)     Charlee Arenas LPN  10/23/24, 16:08 EDT

## 2024-10-26 PROCEDURE — 87186 SC STD MICRODIL/AGAR DIL: CPT | Performed by: NURSE PRACTITIONER

## 2024-10-26 PROCEDURE — 87070 CULTURE OTHR SPECIMN AEROBIC: CPT | Performed by: NURSE PRACTITIONER

## 2024-10-26 PROCEDURE — 87077 CULTURE AEROBIC IDENTIFY: CPT | Performed by: NURSE PRACTITIONER

## 2024-10-26 PROCEDURE — 87205 SMEAR GRAM STAIN: CPT | Performed by: NURSE PRACTITIONER

## 2024-10-28 LAB — INR PPP: 2.8

## 2024-10-29 ENCOUNTER — ANTICOAGULATION VISIT (OUTPATIENT)
Dept: CARDIOLOGY | Facility: CLINIC | Age: 49
End: 2024-10-29
Payer: COMMERCIAL

## 2024-10-29 DIAGNOSIS — Z95.2 S/P MVR (MITRAL VALVE REPLACEMENT): Primary | ICD-10-CM

## 2024-11-05 ENCOUNTER — ANTICOAGULATION VISIT (OUTPATIENT)
Dept: CARDIOLOGY | Facility: CLINIC | Age: 49
End: 2024-11-05
Payer: COMMERCIAL

## 2024-11-05 DIAGNOSIS — Z95.2 S/P MVR (MITRAL VALVE REPLACEMENT): Primary | ICD-10-CM

## 2024-11-05 LAB — INR PPP: 2.8
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
## 2024-11-07 ENCOUNTER — OFFICE VISIT (OUTPATIENT)
Dept: PSYCHIATRY | Facility: CLINIC | Age: 49
End: 2024-11-07
Payer: COMMERCIAL

## 2024-11-07 DIAGNOSIS — F51.05 INSOMNIA DUE TO MENTAL CONDITION: Chronic | ICD-10-CM

## 2024-11-07 DIAGNOSIS — F41.0 PANIC DISORDER: Chronic | ICD-10-CM

## 2024-11-07 DIAGNOSIS — F33.1 MAJOR DEPRESSIVE DISORDER, RECURRENT EPISODE, MODERATE: Primary | Chronic | ICD-10-CM

## 2024-11-07 DIAGNOSIS — F41.1 GENERALIZED ANXIETY DISORDER: Chronic | ICD-10-CM

## 2024-11-07 RX ORDER — LAMOTRIGINE 100 MG/1
100 TABLET ORAL DAILY
Qty: 90 TABLET | Refills: 3 | Status: SHIPPED | OUTPATIENT
Start: 2024-11-07

## 2024-11-12 ENCOUNTER — ANTICOAGULATION VISIT (OUTPATIENT)
Dept: CARDIOLOGY | Facility: CLINIC | Age: 49
End: 2024-11-12
Payer: COMMERCIAL

## 2024-11-12 DIAGNOSIS — Z95.2 S/P MVR (MITRAL VALVE REPLACEMENT): Primary | ICD-10-CM

## 2024-11-12 LAB — INR PPP: 3.3

## 2024-11-19 LAB — INR PPP: 3.3

## 2024-11-20 ENCOUNTER — ANTICOAGULATION VISIT (OUTPATIENT)
Dept: CARDIOLOGY | Facility: CLINIC | Age: 49
End: 2024-11-20
Payer: COMMERCIAL

## 2024-11-20 DIAGNOSIS — F41.1 GENERALIZED ANXIETY DISORDER: Chronic | ICD-10-CM

## 2024-11-20 DIAGNOSIS — Z95.2 S/P MVR (MITRAL VALVE REPLACEMENT): Primary | ICD-10-CM

## 2024-11-20 DIAGNOSIS — F41.0 PANIC DISORDER: Chronic | ICD-10-CM

## 2024-11-20 RX ORDER — ALPRAZOLAM 0.5 MG
0.5 TABLET ORAL 2 TIMES DAILY PRN
Qty: 60 TABLET | Refills: 2 | Status: SHIPPED | OUTPATIENT
Start: 2024-11-20 | End: 2025-11-20

## 2024-11-20 NOTE — TELEPHONE ENCOUNTER
Rx Refill Note  Requested Prescriptions     Pending Prescriptions Disp Refills    ALPRAZolam (Xanax) 0.25 MG tablet 60 tablet 2     Sig: Take 1 tablet by mouth 2 (Two) Times a Day As Needed for Anxiety.        Last office visit with prescribing clinician: 11/7/2024     Next office visit with prescribing clinician: 1/9/2025     Office Visit with Tiarra Leyva APRN (11/07/2024)     [facility] Full Urine Drug Screen - (08/16/2023)     BEHAVIORAL HEALTH - SCAN - Inspect report, Mei, 11/20/24 (11/20/2024)     Inspect Fill 10/21/24    Ksenia Chan  11/20/24, 09:27 EST     She has taken 2 of the 0.25 mg as instructed, and it is working better. But she is out. Also stated doing a test next week, that you wanted her to do

## 2024-11-26 ENCOUNTER — ANTICOAGULATION VISIT (OUTPATIENT)
Dept: CARDIOLOGY | Facility: CLINIC | Age: 49
End: 2024-11-26
Payer: COMMERCIAL

## 2024-11-26 DIAGNOSIS — Z95.2 S/P MVR (MITRAL VALVE REPLACEMENT): Primary | ICD-10-CM

## 2024-11-26 LAB — INR PPP: 3.2

## 2024-12-03 ENCOUNTER — ANTICOAGULATION VISIT (OUTPATIENT)
Dept: CARDIOLOGY | Facility: CLINIC | Age: 49
End: 2024-12-03
Payer: COMMERCIAL

## 2024-12-03 DIAGNOSIS — Z95.2 S/P MVR (MITRAL VALVE REPLACEMENT): Primary | ICD-10-CM

## 2024-12-03 LAB — INR PPP: 2.9

## 2024-12-10 LAB — INR PPP: 3.2

## 2024-12-11 ENCOUNTER — ANTICOAGULATION VISIT (OUTPATIENT)
Dept: CARDIOLOGY | Facility: CLINIC | Age: 49
End: 2024-12-11
Payer: COMMERCIAL

## 2024-12-11 ENCOUNTER — CLINICAL SUPPORT (OUTPATIENT)
Dept: PSYCHIATRY | Facility: CLINIC | Age: 49
End: 2024-12-11
Payer: COMMERCIAL

## 2024-12-11 DIAGNOSIS — F41.1 GENERALIZED ANXIETY DISORDER: ICD-10-CM

## 2024-12-11 DIAGNOSIS — F41.0 PANIC DISORDER: Primary | ICD-10-CM

## 2024-12-11 DIAGNOSIS — Z95.2 S/P MVR (MITRAL VALVE REPLACEMENT): Primary | ICD-10-CM

## 2024-12-11 NOTE — PROGRESS NOTES
Altagracia here for her Random UDS and pill count.  Patient confirmed she is not on CBD or THC.  Patient provided adequate specimen for UDS.        Xanax 0.5mg # 60  Written 11/20/2024 Inspect Fill 11/21/2024 # 60  Peach oblong tablet  Score on one side   on other side  #31

## 2024-12-15 LAB
1OH-MIDAZOLAM UR QL SCN: NOT DETECTED NG/MG CREAT
6MAM UR QL SCN: NEGATIVE NG/ML
7AMINOCLONAZEPAM/CREAT UR: NOT DETECTED NG/MG CREAT
8OH-AMOXAPINE UR QL: NOT DETECTED
8OH-LOXAPINE UR QL SCN: NOT DETECTED
A-OH ALPRAZ/CREAT UR: 94 NG/MG CREAT
A-OH-TRIAZOLAM/CREAT UR CFM: NOT DETECTED NG/MG CREAT
ALPRAZ/CREAT UR CFM: 81 NG/MG CREAT
AMITRIP UR-MCNC: NOT DETECTED NG/ML
AMOXAPINE UR QL: NOT DETECTED
AMPHET UR CFM-MCNC: NOT DETECTED NG/MG CREAT
AMPHETAMINES UR QL SCN>500 NG/ML: NORMAL NG/ML
AMPHETAMINES UR QL: NEGATIVE
ANTICONVULSANTS UR: NEGATIVE
ANTIPSYCHOTICS UR: NEGATIVE
ARIPIPRAZOLE UR QL SCN: NOT DETECTED
ASENAPINE UR QL CFM: NOT DETECTED
BARBITURATES UR QL SCN: NEGATIVE NG/ML
BENZODIAZ SCN METH UR: NORMAL
BUPRENORPHINE UR QL SCN: NEGATIVE NG/ML
BUPROPION UR QL: NOT DETECTED
CANNABINOIDS UR QL SCN: NEGATIVE NG/ML
CARISOPRODOL UR QL: NEGATIVE NG/ML
CHLORPROMAZINE UR QL: NOT DETECTED
CITALOPRAM, UR: NOT DETECTED
CLOMIPRAMINE UR-MCNC: NOT DETECTED NG/ML
CLONAZEPAM/CREAT UR CFM: NOT DETECTED NG/MG CREAT
CLOZAPINE UR QL: NOT DETECTED
COCAINE+BZE UR QL SCN: NEGATIVE NG/ML
CREAT UR-MCNC: 253 MG/DL
DESALKYLFLURAZ/CREAT UR: NOT DETECTED NG/MG CREAT
DESIPRAMINE UR-MCNC: NOT DETECTED NG/ML
DIAZEPAM/CREAT UR: NOT DETECTED NG/MG CREAT
DOXEPIN UR-MCNC: NOT DETECTED NG/ML
DULOXETINE UR QL: NOT DETECTED
ETHANOL UR QL SCN: NEGATIVE NG/ML
FENTANYL UR QL SCN: NEGATIVE NG/ML
FLUNITRAZEPAM UR QL SCN: NOT DETECTED NG/MG CREAT
FLUOXETINE UR QL SCN: NOT DETECTED
FLUPHENAZINE UR-MCNC: NOT DETECTED NG/ML
FLUVOXAMINE UR QL: NOT DETECTED
GABAPENTIN UR-MCNC: NEGATIVE UG/ML
HALOPERIDOL UR QL: NOT DETECTED
ILOPERIDONE UR QL CFM: NOT DETECTED
IMIPRAMINE UR-MCNC: NOT DETECTED NG/ML
KRATOM IA, UR: NEGATIVE NG/ML
LORAZEPAM/CREAT UR: NOT DETECTED NG/MG CREAT
LOXAPINE UR QL: NOT DETECTED
LURASIDONE UR QL CFM: NOT DETECTED
MAPROTILINE UR QL: NOT DETECTED
MDA UR CFM-MCNC: NOT DETECTED NG/MG CREAT
MDMA UR CFM-MCNC: NOT DETECTED NG/MG CREAT
ME-PHENIDATE UR QL SCN: NEGATIVE NG/ML
MESORIDAZINE UR QL: NOT DETECTED
METHADONE UR QL SCN: NEGATIVE NG/ML
METHADONE+METAB UR QL SCN: NEGATIVE NG/ML
METHAMPHET UR CFM-MCNC: NOT DETECTED NG/MG CREAT
MIDAZOLAM/CREAT UR CFM: NOT DETECTED NG/MG CREAT
MIRTAZAPINE UR-MCNC: NOT DETECTED UG/ML
MOLINDONE UR QL SCN: NOT DETECTED
NEFAZODONE UR QL: NOT DETECTED
NORCITALOPRAM UR QL: NOT DETECTED
NORCLOMIPRAMINE UR QL: NOT DETECTED
NORCLOZAPINE UR QL: NOT DETECTED
NORDIAZEPAM/CREAT UR: NOT DETECTED NG/MG CREAT
NORDOXEPIN UR QL: NOT DETECTED
NORFLUNITRAZEPAM UR-MCNC: NOT DETECTED NG/MG CREAT
NORFLUOXETINE UR-MCNC: NOT DETECTED NG/ML
NORSERTRALINE UR QL: NOT DETECTED
NORTRIP UR-MCNC: NOT DETECTED NG/ML
ODV UR-MCNC: NOT DETECTED NG/ML
OH-BUPROPION UR-MCNC: NOT DETECTED NG/ML
OLANZAPINE UR CFM-MCNC: NOT DETECTED NG/ML
OPIATES UR SCN-MCNC: NEGATIVE NG/ML
OXAZEPAM/CREAT UR: NOT DETECTED NG/MG CREAT
OXYCODONE CTO UR SCN-MCNC: NEGATIVE NG/ML
PAROXETINE UR-MCNC: NOT DETECTED NG/L
PCP UR QL SCN: NEGATIVE NG/ML
PERPHENAZINE UR QL: NOT DETECTED
PIMOZIDE, UR: NOT DETECTED
PREGABALIN UR QL SCN: NOT DETECTED
PRESCRIBED MEDICATIONS: NORMAL
PROCHLORPERAZINE UR QL: NOT DETECTED
PROPOXYPH UR QL SCN: NEGATIVE NG/ML
PROTRIP UR QL: NOT DETECTED
QUETIAPINE CTO UR CFM-MCNC: NOT DETECTED NG/ML
RISPERIDONE UR QL: NOT DETECTED
SERTRALINE UR-MCNC: NOT DETECTED NG/ML
TAPENTADOL CTO UR SCN-MCNC: NEGATIVE NG/ML
TEMAZEPAM/CREAT UR: NOT DETECTED NG/MG CREAT
THIORIDAZINE UR-MCNC: NOT DETECTED UG/ML
THIOTHIXENE UR QL: NOT DETECTED
TRAMADOL UR QL SCN: NEGATIVE NG/ML
TRAZODONE UR QL: PRESENT
TRAZODONE UR-MCNC: PRESENT UG/ML
TRICYCLICS TESTED UR SCN: NORMAL
TRIFPERAZINE UR QL: NOT DETECTED
TRIMIPRAMINE UR QL: NOT DETECTED
VENLAFAXINE UR QL: NOT DETECTED
VILAZ UR QL SCN: PRESENT
ZIPRASIDONE UR QL SCN: NOT DETECTED

## 2024-12-17 LAB — INR PPP: 3.3

## 2024-12-18 ENCOUNTER — ANTICOAGULATION VISIT (OUTPATIENT)
Dept: CARDIOLOGY | Facility: CLINIC | Age: 49
End: 2024-12-18
Payer: COMMERCIAL

## 2024-12-18 DIAGNOSIS — Z95.2 S/P MVR (MITRAL VALVE REPLACEMENT): Primary | ICD-10-CM

## 2024-12-24 ENCOUNTER — ANTICOAGULATION VISIT (OUTPATIENT)
Dept: CARDIOLOGY | Facility: CLINIC | Age: 49
End: 2024-12-24
Payer: COMMERCIAL

## 2024-12-24 DIAGNOSIS — Z95.2 S/P MVR (MITRAL VALVE REPLACEMENT): Primary | ICD-10-CM

## 2024-12-24 LAB — INR PPP: 3.8

## 2024-12-30 LAB — INR PPP: 2.2

## 2024-12-31 ENCOUNTER — ANTICOAGULATION VISIT (OUTPATIENT)
Dept: CARDIOLOGY | Facility: CLINIC | Age: 49
End: 2024-12-31
Payer: COMMERCIAL

## 2024-12-31 DIAGNOSIS — Z95.2 S/P MVR (MITRAL VALVE REPLACEMENT): Primary | ICD-10-CM

## 2024-12-31 NOTE — PROGRESS NOTES
Spoke to pt, she called in INR of 2.2. She had been a little high last week and held Warfarin for 1 day and increased greens in her diet. Advised pt to continue current dosage and recheck in a week. Mar

## 2025-01-08 LAB — INR PPP: 3.9

## 2025-01-09 ENCOUNTER — ANTICOAGULATION VISIT (OUTPATIENT)
Dept: CARDIOLOGY | Facility: CLINIC | Age: 50
End: 2025-01-09
Payer: COMMERCIAL

## 2025-01-09 DIAGNOSIS — Z95.2 S/P MVR (MITRAL VALVE REPLACEMENT): Primary | ICD-10-CM

## 2025-01-09 NOTE — PROGRESS NOTES
Spoke to pt, INR was slightly elevated at 3.9. She will hold Warfarin today, then continue current dosage and recheck in a week. Mar

## 2025-01-13 LAB — INR PPP: 3.8

## 2025-01-14 ENCOUNTER — ANTICOAGULATION VISIT (OUTPATIENT)
Dept: CARDIOLOGY | Facility: CLINIC | Age: 50
End: 2025-01-14
Payer: COMMERCIAL

## 2025-01-14 DIAGNOSIS — Z95.2 S/P MVR (MITRAL VALVE REPLACEMENT): Primary | ICD-10-CM

## 2025-01-14 NOTE — PROGRESS NOTES
Spoke to pt, INR is slightly elevated at 3.8. Pt will reduce dosage to 4 mgs, qd and recheck in a week. [email or web address]

## 2025-01-15 ENCOUNTER — OFFICE VISIT (OUTPATIENT)
Dept: PSYCHIATRY | Facility: CLINIC | Age: 50
End: 2025-01-15
Payer: COMMERCIAL

## 2025-01-15 DIAGNOSIS — F41.0 PANIC DISORDER: Chronic | ICD-10-CM

## 2025-01-15 DIAGNOSIS — F51.05 INSOMNIA DUE TO MENTAL CONDITION: Chronic | ICD-10-CM

## 2025-01-15 DIAGNOSIS — F33.1 MAJOR DEPRESSIVE DISORDER, RECURRENT EPISODE, MODERATE: Chronic | ICD-10-CM

## 2025-01-15 DIAGNOSIS — F41.1 GENERALIZED ANXIETY DISORDER: Primary | Chronic | ICD-10-CM

## 2025-01-15 NOTE — PROGRESS NOTES
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 be more visits with results that are not included.       Assessment & Plan   Diagnoses and all orders for this visit:    1. Generalized anxiety disorder (Primary)    2. Panic disorder    3. Major depressive disorder, recurrent episode, moderate    4. Insomnia due to mental condition        Assessment & Plan  1. Major Depressive Disorder.    Continue vilazodone 40mg.   Continue lamotrigine 100mg.   She reports that her depression is well-managed with her current medication regimen. She will continue her current medications. She is advised to contact the office if she requires any refills or if there are any changes in her symptoms.    2. Generalized Anxiety Disorder.  Continue vilazodone 40mg.   Continue lamotrigine 100mg.   Continue alprazolam 0.5mg twice daily as needed for anxiety.   Her anxiety is being managed with alprazolam 0.5 mg twice daily as needed. She reports that the increased dose of alprazolam has been more effective. She will continue with the current dosage and frequency. She is advised to monitor her symptoms and contact the office if there are any changes or if she needs additional refills.    3. Panic  Disorder.  Continue alprazolam 0.5mg twice daily as needed for anxiety.   Her panic disorder is being managed with her current medication regimen. She reports no new panic attacks and feels that her medications are effective. She will continue with her current treatment plan and is advised to contact the office if there are any changes in her symptoms.    4. Insomnia.  Continue trazodone 100mg nightly.   Her insomnia is being managed with trazodone 100 mg. She recently refilled her trazodone prescription. She will continue with her current medication regimen. She is advised to contact the office if there are any changes in her sleep patterns or if she needs additional refills.    Follow-up  The patient will follow up in 4 months.      Visit Diagnoses:    ICD-10-CM ICD-9-CM   1. Generalized anxiety disorder  F41.1 300.02   2. Panic disorder  F41.0 300.01   3. Major depressive disorder, recurrent episode, moderate  F33.1 296.32   4. Insomnia due to mental condition  F51.05 300.9     327.02           The above listed condition/conditions are some improvement with treatment, moderate intensity.  Pt history, review of systems, medications, allergies, reviewed, patient was screened today for depression/anxiety, PHQ/KATHE scores reviewed.  Most recent vitals/labs reviewed.  Pt was given appropriate time to ask questions and concerns were addressed. A thorough discussion was had that included review of disease process, need for continued monitoring and additional treatment options including use of pharmacological and non-pharmacological approaches to care, decisions were made and agreed upon by patient and provider.   Discussed the risks, benefits, and potential side effects of the medications; patient ackowledged and verbally consented.     TREATMENT PLAN/GOALS: Continue supportive psychotherapy efforts and medications as indicated. Treatment and medication options discussed during today's visit. Patient ackowledged and  verbally consented to continue with current treatment plan and was educated on the importance of compliance with treatment and follow-up appointments.    -Short-Term Goals: Patient will be compliant with medication management and note improvement in S/S over the next 4 to 6 weeks or at next scheduled visit.  -Long-Term Goals: Patient will be compliant with the agreed treatment plan including medication regimen & F/U appt's and deny impairment in daily functioning over the next 6 months.      CRISIS RESOURCES:    In the event you have personal crisis, there are several resources to reach someone to talk with:    798 Suicide and Crisis Lifeline  Call or text 987 or chat 984HighRoadsline.org  [facility]'s National Helpline  0-820-400-HELP (4357)  Text your zip code to 037264 (HELP4U)  [place]'s Crisis Line  Dial 512, then press 1  Text 993779    No show policy:  We understand unexpected circumstances arise; however, anytime you miss your appointment we are unable to provide you appropriate care.  In addition, each appointment missed could have been used to provide care for others.  We ask that you call at least 24 hours in advance to cancel or reschedule an appointment. We would like to take this opportunity to remind you of our policy stating patients who miss THREE appointments without cancelling or rescheduling 24 hours in advance of the appointment may be subject to cancellation of any further visits with our clinic and recommendation to seek in-person services/visits. Please call [phone] to reschedule your appointment. If there are reasons that make it difficult for you to keep the appointments, please call and let us know how we can help. Please understand that medication prescribing will not continue without seeing your provider.        MEDICATION ISSUES:  INSPECT reviewed as expected    Discussed medication options and treatment plan of prescribed medication as well as the risks, benefits, and side effects  including potential falls, possible impaired driving and metabolic adversities among others. Patient is agreeable to call the office with any worsening of symptoms or onset of side effects. Patient is agreeable to call 911 or go to the nearest ER should he/she begin having SI/HI. No medication side effects or related complaints today.     MEDS ORDERED DURING VISIT:  No orders of the defined types were placed in this encounter.      Return in about 4 months (around 5/15/2025).         This document has been electronically signed by ISSAC Fleming  January 15, 2025 22:07 EST    Part of this note may be an electronic transcription/translation of spoken language to printed text using the Dragon Dictation System. Some of the data in this electronic note has been brought forward from a previous encounter, any necessary changes have been made, it has been reviewed by this APRN, and it is accurate.

## 2025-01-20 LAB — INR PPP: 3.6

## 2025-01-21 ENCOUNTER — ANTICOAGULATION VISIT (OUTPATIENT)
Dept: CARDIOLOGY | Facility: CLINIC | Age: 50
End: 2025-01-21
Payer: COMMERCIAL

## 2025-01-21 DIAGNOSIS — Z95.2 S/P MVR (MITRAL VALVE REPLACEMENT): Primary | ICD-10-CM

## 2025-01-21 NOTE — PROGRESS NOTES
Called patient, LMOM to [facility], recheck as contracted. INR little elevated at 3.6, may eat more greens.

## 2025-01-22 DIAGNOSIS — Z95.2 S/P MVR (MITRAL VALVE REPLACEMENT): ICD-10-CM

## 2025-01-22 DIAGNOSIS — Z79.01 LONG TERM (CURRENT) USE OF ANTICOAGULANTS: ICD-10-CM

## 2025-01-22 RX ORDER — WARFARIN SODIUM 4 MG/1
TABLET ORAL
Qty: 90 TABLET | Refills: 0 | Status: SHIPPED | OUTPATIENT
Start: 2025-01-22

## 2025-01-22 NOTE — TELEPHONE ENCOUNTER
Rx Refill Note  Requested Prescriptions     Pending Prescriptions Disp Refills    warfarin (COUMADIN) 4 MG tablet [Pharmacy Med Name: WARFARIN SODIUM 4 MG TABLET] 90 tablet 0     Sig: Take 1 tab, by mouth, daily or as directed.    Last INR 1/21/25  Last office visit with prescribing clinician: 2/22/2024   Last telemedicine visit with prescribing clinician: Visit date not found   Next office visit with prescribing clinician: 2/27/2025                         Would you like a call back once the refill request has been completed: [] Yes [] No    If the office needs to give you a call back, can they leave a voicemail: [] Yes [] No    Tiarra Quijano RN  01/22/25, 14:09 EST

## 2025-01-28 LAB — INR PPP: 3.1

## 2025-01-29 ENCOUNTER — ANTICOAGULATION VISIT (OUTPATIENT)
Dept: CARDIOLOGY | Facility: CLINIC | Age: 50
End: 2025-01-29
Payer: COMMERCIAL

## 2025-01-29 DIAGNOSIS — Z95.2 S/P MVR (MITRAL VALVE REPLACEMENT): Primary | ICD-10-CM

## 2025-02-03 LAB — INR PPP: 3.3

## 2025-02-04 ENCOUNTER — ANTICOAGULATION VISIT (OUTPATIENT)
Dept: CARDIOLOGY | Facility: CLINIC | Age: 50
End: 2025-02-04
Payer: COMMERCIAL

## 2025-02-04 DIAGNOSIS — Z95.2 S/P MVR (MITRAL VALVE REPLACEMENT): Primary | ICD-10-CM

## 2025-02-10 LAB — INR PPP: 3.3

## 2025-02-11 ENCOUNTER — ANTICOAGULATION VISIT (OUTPATIENT)
Dept: CARDIOLOGY | Facility: CLINIC | Age: 50
End: 2025-02-11
Payer: COMMERCIAL

## 2025-02-11 DIAGNOSIS — Z95.2 S/P MVR (MITRAL VALVE REPLACEMENT): Primary | ICD-10-CM

## 2025-02-17 LAB — INR PPP: 4.1

## 2025-02-18 ENCOUNTER — TELEPHONE (OUTPATIENT)
Dept: CARDIOLOGY | Facility: CLINIC | Age: 50
End: 2025-02-18
Payer: COMMERCIAL

## 2025-02-18 ENCOUNTER — ANTICOAGULATION VISIT (OUTPATIENT)
Dept: CARDIOLOGY | Facility: CLINIC | Age: 50
End: 2025-02-18
Payer: COMMERCIAL

## 2025-02-18 DIAGNOSIS — Z95.2 S/P MVR (MITRAL VALVE REPLACEMENT): Primary | ICD-10-CM

## 2025-02-18 NOTE — PROGRESS NOTES
Spoke to patient, she had not felt well last week and had taken some phenergan. Her appetite had not been very good last week. She will hold Warfarin today, then resume current dosage and recheck on Monday. Mar

## 2025-02-23 DIAGNOSIS — Z12.31 ENCOUNTER FOR SCREENING MAMMOGRAM FOR MALIGNANT NEOPLASM OF BREAST: Primary | ICD-10-CM

## 2025-02-24 LAB — INR PPP: 5

## 2025-02-25 ENCOUNTER — TELEPHONE (OUTPATIENT)
Dept: CARDIOLOGY | Facility: CLINIC | Age: 50
End: 2025-02-25
Payer: COMMERCIAL

## 2025-02-25 ENCOUNTER — ANTICOAGULATION VISIT (OUTPATIENT)
Dept: CARDIOLOGY | Facility: CLINIC | Age: 50
End: 2025-02-25
Payer: COMMERCIAL

## 2025-02-25 DIAGNOSIS — Z95.2 S/P MVR (MITRAL VALVE REPLACEMENT): Primary | ICD-10-CM

## 2025-02-25 NOTE — PROGRESS NOTES
Pt reports starting Semiglutide injections. She has been eating a lot less. Pt to hold Warfarin until appt on Thurs. Will recheck INR at office visit. Fiordaliza

## 2025-02-25 NOTE — TELEPHONE ENCOUNTER
Called pt and discussed high INR of 5.0. Pt to hold Warfarin and recheck INR in office on Thurs. Fiordaliza

## 2025-02-27 ENCOUNTER — ANTICOAGULATION VISIT (OUTPATIENT)
Dept: CARDIOLOGY | Facility: CLINIC | Age: 50
End: 2025-02-27
Payer: COMMERCIAL

## 2025-02-27 ENCOUNTER — OFFICE VISIT (OUTPATIENT)
Dept: CARDIOLOGY | Facility: CLINIC | Age: 50
End: 2025-02-27
Payer: COMMERCIAL

## 2025-02-27 VITALS
DIASTOLIC BLOOD PRESSURE: 81 MMHG | WEIGHT: 243 LBS | HEART RATE: 97 BPM | SYSTOLIC BLOOD PRESSURE: 128 MMHG | BODY MASS INDEX: 36.95 KG/M2

## 2025-02-27 VITALS
HEART RATE: 97 BPM | DIASTOLIC BLOOD PRESSURE: 81 MMHG | WEIGHT: 243 LBS | BODY MASS INDEX: 36.83 KG/M2 | SYSTOLIC BLOOD PRESSURE: 128 MMHG | HEIGHT: 68 IN

## 2025-02-27 DIAGNOSIS — I10 PRIMARY HYPERTENSION: ICD-10-CM

## 2025-02-27 DIAGNOSIS — Z95.2 S/P MVR (MITRAL VALVE REPLACEMENT): Primary | ICD-10-CM

## 2025-02-27 DIAGNOSIS — I25.118 CORONARY ARTERY DISEASE OF NATIVE ARTERY OF NATIVE HEART WITH STABLE ANGINA PECTORIS: ICD-10-CM

## 2025-02-27 DIAGNOSIS — E78.00 PURE HYPERCHOLESTEROLEMIA: ICD-10-CM

## 2025-02-27 LAB — INR PPP: 3.2 (ref 0.9–1.1)

## 2025-02-27 PROCEDURE — 36416 COLLJ CAPILLARY BLOOD SPEC: CPT | Performed by: INTERNAL MEDICINE

## 2025-02-27 PROCEDURE — 85610 PROTHROMBIN TIME: CPT | Performed by: INTERNAL MEDICINE

## 2025-02-27 RX ORDER — CARVEDILOL 3.12 MG/1
3.12 TABLET ORAL 2 TIMES DAILY WITH MEALS
COMMUNITY
Start: 2024-11-12

## 2025-02-27 RX ORDER — UBROGEPANT 100 MG/1
100 TABLET ORAL AS NEEDED
COMMUNITY

## 2025-02-27 NOTE — PROGRESS NOTES
Pts INR was back to therapeutic range of 3.2. Pt to resume 4 mgs daily and recheck INR next week. javierRN
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
## 2025-03-05 DIAGNOSIS — F41.0 PANIC DISORDER: Chronic | ICD-10-CM

## 2025-03-05 DIAGNOSIS — F41.1 GENERALIZED ANXIETY DISORDER: Chronic | ICD-10-CM

## 2025-03-05 RX ORDER — ALPRAZOLAM 0.5 MG
0.5 TABLET ORAL 2 TIMES DAILY PRN
Qty: 60 TABLET | Refills: 2 | Status: SHIPPED | OUTPATIENT
Start: 2025-03-05

## 2025-03-05 NOTE — TELEPHONE ENCOUNTER
Rx Refill Note  Requested Prescriptions     Pending Prescriptions Disp Refills    ALPRAZolam (XANAX) 0.5 MG tablet [Pharmacy Med Name: ALPRAZOLAM 0.5 MG TABLET] 60 tablet      Sig: TAKE 1 TABLET BY MOUTH TWO TIMES A DAY AS NEEDED FOR ANXIETY        Last office visit with prescribing clinician: 1/15/2025     Next office visit with prescribing clinician: 5/15/2025     Office Visit with Tiarra Leyva APRN (01/15/2025)     ToxAssure Flex 22, Ur w/DL - Urine, Random Void (12/11/2024 14:53)     BEHAVIORAL HEALTH - SCAN - inspect report c. cotton; 3/5/25 (03/05/2025)       Inspect Fill 12/30/24    Ksenia Chan  03/05/25, 13:02 EST

## 2025-03-10 ENCOUNTER — ANTICOAGULATION VISIT (OUTPATIENT)
Dept: CARDIOLOGY | Facility: CLINIC | Age: 50
End: 2025-03-10
Payer: COMMERCIAL

## 2025-03-10 DIAGNOSIS — Z95.2 S/P MVR (MITRAL VALVE REPLACEMENT): Primary | ICD-10-CM

## 2025-03-10 LAB — INR PPP: 3.6

## 2025-03-10 NOTE — PROGRESS NOTES
Called patient, hold warfarin today due to Home monitor INR 3.6. CPM and recheck as contracted.  Spoke to patient, confirmed directions and she will recheck next week.

## 2025-03-18 LAB — INR PPP: 2.6

## 2025-03-19 ENCOUNTER — ANTICOAGULATION VISIT (OUTPATIENT)
Dept: CARDIOLOGY | Facility: CLINIC | Age: 50
End: 2025-03-19
Payer: COMMERCIAL

## 2025-03-19 ENCOUNTER — HOSPITAL ENCOUNTER (OUTPATIENT)
Dept: MAMMOGRAPHY | Facility: HOSPITAL | Age: 50
Discharge: HOME | End: 2025-03-19
Admitting: INTERNAL MEDICINE
Payer: COMMERCIAL

## 2025-03-19 DIAGNOSIS — Z95.2 S/P MVR (MITRAL VALVE REPLACEMENT): Primary | ICD-10-CM

## 2025-03-19 DIAGNOSIS — Z12.31 ENCOUNTER FOR SCREENING MAMMOGRAM FOR MALIGNANT NEOPLASM OF BREAST: ICD-10-CM

## 2025-03-19 LAB
NCCN CRITERIA FLAG: NORMAL
TYRER CUZICK SCORE: 9.5

## 2025-03-19 PROCEDURE — 77067 SCR MAMMO BI INCL CAD: CPT

## 2025-03-19 PROCEDURE — 77063 BREAST TOMOSYNTHESIS BI: CPT

## 2025-03-24 LAB — INR PPP: 2.9

## 2025-03-25 ENCOUNTER — ANTICOAGULATION VISIT (OUTPATIENT)
Dept: CARDIOLOGY | Facility: CLINIC | Age: 50
End: 2025-03-25
Payer: COMMERCIAL

## 2025-03-25 DIAGNOSIS — Z95.2 S/P MVR (MITRAL VALVE REPLACEMENT): Primary | ICD-10-CM

## 2025-03-25 NOTE — PROGRESS NOTES
Patient's INR-2.9, within therapeutic range. Continue current dosage and recheck as contracted. AbdulazizN

## 2025-03-31 LAB — INR PPP: 2.7

## 2025-04-01 ENCOUNTER — ANTICOAGULATION VISIT (OUTPATIENT)
Dept: CARDIOLOGY | Facility: CLINIC | Age: 50
End: 2025-04-01
Payer: COMMERCIAL

## 2025-04-01 DIAGNOSIS — Z95.2 S/P MVR (MITRAL VALVE REPLACEMENT): Primary | ICD-10-CM

## 2025-04-07 ENCOUNTER — HOSPITAL ENCOUNTER (OUTPATIENT)
Dept: ULTRASOUND IMAGING | Facility: HOSPITAL | Age: 50
Discharge: HOME | End: 2025-04-07
Payer: COMMERCIAL

## 2025-04-07 ENCOUNTER — HOSPITAL ENCOUNTER (OUTPATIENT)
Dept: MAMMOGRAPHY | Facility: HOSPITAL | Age: 50
Discharge: HOME | End: 2025-04-07
Payer: COMMERCIAL

## 2025-04-07 DIAGNOSIS — N64.89 BREAST ASYMMETRY: ICD-10-CM

## 2025-04-07 PROCEDURE — 76642 ULTRASOUND BREAST LIMITED: CPT

## 2025-04-07 PROCEDURE — G0279 TOMOSYNTHESIS, MAMMO: HCPCS

## 2025-04-07 PROCEDURE — 77065 DX MAMMO INCL CAD UNI: CPT

## 2025-04-08 LAB — INR PPP: 4.7

## 2025-04-09 ENCOUNTER — ANTICOAGULATION VISIT (OUTPATIENT)
Dept: CARDIOLOGY | Facility: CLINIC | Age: 50
End: 2025-04-09
Payer: COMMERCIAL

## 2025-04-09 ENCOUNTER — TELEPHONE (OUTPATIENT)
Dept: CARDIOLOGY | Facility: CLINIC | Age: 50
End: 2025-04-09
Payer: COMMERCIAL

## 2025-04-09 DIAGNOSIS — Z79.01 LONG TERM (CURRENT) USE OF ANTICOAGULANTS: ICD-10-CM

## 2025-04-09 DIAGNOSIS — Z95.2 S/P MVR (MITRAL VALVE REPLACEMENT): Primary | ICD-10-CM

## 2025-04-09 RX ORDER — ENOXAPARIN SODIUM 100 MG/ML
100 INJECTION SUBCUTANEOUS EVERY 12 HOURS SCHEDULED
Qty: 10 ML | Refills: 0 | Status: SHIPPED | OUTPATIENT
Start: 2025-04-19 | End: 2025-04-24

## 2025-04-09 NOTE — TELEPHONE ENCOUNTER
Spoke to pt RE: Lovenox bridge for biopsy. I will order Lovenox from the [facility] on [address], [place], [place]. Will order Lovenox 100 mg inj, bid for at least 2 days before and 2 days after. Mar

## 2025-04-09 NOTE — TELEPHONE ENCOUNTER
FACILITY: [facility]   DR:   PHONE: [phone]  FAX: [phone]  PROCEDURE: Biopsy   SCHEDULED: 04/22/25  MEDS TO HOLD: Coumadin     Clearance has been faxed

## 2025-04-10 ENCOUNTER — TELEPHONE (OUTPATIENT)
Dept: CARDIOLOGY | Facility: CLINIC | Age: 50
End: 2025-04-10
Payer: COMMERCIAL

## 2025-04-10 NOTE — TELEPHONE ENCOUNTER
Bboby at [facility]  Phone [phone]    Requesting call back. Has questions about Lovenox.

## 2025-04-15 LAB — INR PPP: 2.7

## 2025-04-16 ENCOUNTER — ANTICOAGULATION VISIT (OUTPATIENT)
Dept: CARDIOLOGY | Facility: CLINIC | Age: 50
End: 2025-04-16
Payer: COMMERCIAL

## 2025-04-16 DIAGNOSIS — Z95.2 S/P MVR (MITRAL VALVE REPLACEMENT): Primary | ICD-10-CM

## 2025-04-16 NOTE — PROGRESS NOTES
Patient's INR- 2.7, within therapeutic range. Continue current dose and recheck as contracted. dvLPN

## 2025-04-18 ENCOUNTER — TELEPHONE (OUTPATIENT)
Dept: CARDIOLOGY | Facility: CLINIC | Age: 50
End: 2025-04-18
Payer: COMMERCIAL

## 2025-04-22 ENCOUNTER — HOSPITAL ENCOUNTER (OUTPATIENT)
Dept: MAMMOGRAPHY | Facility: HOSPITAL | Age: 50
Discharge: HOME | End: 2025-04-22
Payer: COMMERCIAL

## 2025-04-22 ENCOUNTER — HOSPITAL ENCOUNTER (OUTPATIENT)
Dept: ULTRASOUND IMAGING | Facility: HOSPITAL | Age: 50
Discharge: HOME | End: 2025-04-22
Payer: COMMERCIAL

## 2025-04-22 DIAGNOSIS — N63.10 MASS OF RIGHT BREAST ON MAMMOGRAM: ICD-10-CM

## 2025-04-22 PROCEDURE — 88305 TISSUE EXAM BY PATHOLOGIST: CPT | Performed by: INTERNAL MEDICINE

## 2025-04-22 PROCEDURE — 25010000002 LIDOCAINE-EPINEPHRINE 2 %-1:100000 SOLUTION: Performed by: INTERNAL MEDICINE

## 2025-04-22 PROCEDURE — A4648 IMPLANTABLE TISSUE MARKER: HCPCS

## 2025-04-22 PROCEDURE — 25010000002 LIDOCAINE 1 % SOLUTION: Performed by: INTERNAL MEDICINE

## 2025-04-22 PROCEDURE — 88342 IMHCHEM/IMCYTCHM 1ST ANTB: CPT | Performed by: INTERNAL MEDICINE

## 2025-04-22 RX ORDER — LIDOCAINE HYDROCHLORIDE AND EPINEPHRINE BITARTRATE 20; .01 MG/ML; MG/ML
10 INJECTION, SOLUTION SUBCUTANEOUS ONCE
Status: COMPLETED | OUTPATIENT
Start: 2025-04-22 | End: 2025-04-22

## 2025-04-22 RX ORDER — LIDOCAINE HYDROCHLORIDE 10 MG/ML
10 INJECTION, SOLUTION INFILTRATION; PERINEURAL ONCE
Status: COMPLETED | OUTPATIENT
Start: 2025-04-22 | End: 2025-04-22

## 2025-04-22 RX ADMIN — LIDOCAINE HYDROCHLORIDE AND EPINEPHRINE 10 ML: 20; 10 INJECTION, SOLUTION INFILTRATION; PERINEURAL at 14:05

## 2025-04-22 RX ADMIN — LIDOCAINE HYDROCHLORIDE 10 ML: 10 INJECTION, SOLUTION INFILTRATION; PERINEURAL at 14:05

## 2025-04-23 ENCOUNTER — TELEPHONE (OUTPATIENT)
Dept: MAMMOGRAPHY | Facility: HOSPITAL | Age: 50
End: 2025-04-23
Payer: COMMERCIAL

## 2025-04-23 NOTE — TELEPHONE ENCOUNTER
Altagracia is using the ice packs and doing ok. She has my contact information if she has any questions.

## 2025-04-24 LAB
LAB AP CASE REPORT: NORMAL
LAB AP DIAGNOSIS COMMENT: NORMAL
PATH REPORT.FINAL DX SPEC: NORMAL
PATH REPORT.GROSS SPEC: NORMAL

## 2025-04-25 ENCOUNTER — ANTICOAGULATION VISIT (OUTPATIENT)
Dept: CARDIOLOGY | Facility: CLINIC | Age: 50
End: 2025-04-25
Payer: COMMERCIAL

## 2025-04-25 DIAGNOSIS — Z95.2 S/P MVR (MITRAL VALVE REPLACEMENT): Primary | ICD-10-CM

## 2025-04-25 LAB — INR PPP: 1.8

## 2025-04-25 NOTE — PROGRESS NOTES
This Sn instructed patient to continue the Lovenox until her INR reaches 2.0. Increase her Warfarin to 6mg today and recheck tomorrow, 4/26. Jazmyne

## 2025-04-28 ENCOUNTER — ANTICOAGULATION VISIT (OUTPATIENT)
Dept: CARDIOLOGY | Facility: CLINIC | Age: 50
End: 2025-04-28
Payer: COMMERCIAL

## 2025-04-28 DIAGNOSIS — Z95.2 S/P MVR (MITRAL VALVE REPLACEMENT): Primary | ICD-10-CM

## 2025-04-28 LAB — INR PPP: 2.7

## 2025-04-28 NOTE — PROGRESS NOTES
Pts INR is back within therapeutic range at 2.7 after being on Lovenox. Pt to continue current dosage and recheck as contracted. javierRN

## 2025-05-06 LAB — INR PPP: 2.6

## 2025-05-07 ENCOUNTER — ANTICOAGULATION VISIT (OUTPATIENT)
Dept: CARDIOLOGY | Facility: CLINIC | Age: 50
End: 2025-05-07
Payer: COMMERCIAL

## 2025-05-07 DIAGNOSIS — Z95.2 S/P MVR (MITRAL VALVE REPLACEMENT): Primary | ICD-10-CM

## 2025-05-07 NOTE — PROGRESS NOTES
Patient's INR- 2.6, within therapeutic range. Continue current dosage and recheck as contracted. AbdulazizN

## 2025-05-08 ENCOUNTER — PATIENT MESSAGE (OUTPATIENT)
Dept: CARDIOLOGY | Facility: CLINIC | Age: 50
End: 2025-05-08
Payer: COMMERCIAL

## 2025-05-09 ENCOUNTER — ANTICOAGULATION VISIT (OUTPATIENT)
Dept: CARDIOLOGY | Facility: CLINIC | Age: 50
End: 2025-05-09
Payer: COMMERCIAL

## 2025-05-09 DIAGNOSIS — Z95.2 S/P MVR (MITRAL VALVE REPLACEMENT): Primary | ICD-10-CM

## 2025-05-09 LAB — INR PPP: 1.9

## 2025-05-09 NOTE — PROGRESS NOTES
Patient's INR- 1.9, outside of therapeutic range. Patient started ABT & Steroids on 5/8. This SN instructed patient to recheck her INR on 5/12 and we will follow up then for changes. rodriLPN

## 2025-05-12 LAB — INR PPP: 3.5

## 2025-05-13 ENCOUNTER — ANTICOAGULATION VISIT (OUTPATIENT)
Dept: CARDIOLOGY | Facility: CLINIC | Age: 50
End: 2025-05-13
Payer: COMMERCIAL

## 2025-05-13 DIAGNOSIS — Z95.2 S/P MVR (MITRAL VALVE REPLACEMENT): Primary | ICD-10-CM

## 2025-05-13 NOTE — PROGRESS NOTES
Patient's INR- 3.5, within therapeutic range. Continue current dosage and recheck as contracted. AbdulazizN

## 2025-05-16 DIAGNOSIS — Z95.2 S/P MVR (MITRAL VALVE REPLACEMENT): ICD-10-CM

## 2025-05-16 DIAGNOSIS — Z79.01 LONG TERM (CURRENT) USE OF ANTICOAGULANTS: ICD-10-CM

## 2025-05-16 RX ORDER — WARFARIN SODIUM 4 MG/1
4 TABLET ORAL DAILY
Qty: 90 TABLET | Refills: 0 | Status: SHIPPED | OUTPATIENT
Start: 2025-05-16

## 2025-05-16 NOTE — TELEPHONE ENCOUNTER
Warfarin 4mg, TAKE 1 TABLET BY MOUTH DAILY OR AS DIRECTED. Script sent to [facility] for refill. AbdulazizN

## 2025-05-19 LAB — INR PPP: 2.6

## 2025-05-20 ENCOUNTER — ANTICOAGULATION VISIT (OUTPATIENT)
Dept: CARDIOLOGY | Facility: CLINIC | Age: 50
End: 2025-05-20
Payer: COMMERCIAL

## 2025-05-20 DIAGNOSIS — Z95.2 S/P MVR (MITRAL VALVE REPLACEMENT): Primary | ICD-10-CM

## 2025-05-26 ENCOUNTER — PATIENT MESSAGE (OUTPATIENT)
Dept: CARDIOLOGY | Facility: CLINIC | Age: 50
End: 2025-05-26
Payer: COMMERCIAL

## 2025-05-26 LAB — INR PPP: 3.7

## 2025-05-27 ENCOUNTER — ANTICOAGULATION VISIT (OUTPATIENT)
Dept: CARDIOLOGY | Facility: CLINIC | Age: 50
End: 2025-05-27
Payer: COMMERCIAL

## 2025-05-27 DIAGNOSIS — Z95.2 S/P MVR (MITRAL VALVE REPLACEMENT): ICD-10-CM

## 2025-05-27 DIAGNOSIS — Z95.2 S/P MVR (MITRAL VALVE REPLACEMENT): Primary | ICD-10-CM

## 2025-05-27 DIAGNOSIS — Z79.01 LONG TERM (CURRENT) USE OF ANTICOAGULANTS: ICD-10-CM

## 2025-05-27 RX ORDER — WARFARIN SODIUM 4 MG/1
4 TABLET ORAL DAILY
Qty: 90 TABLET | Refills: 0 | Status: SHIPPED | OUTPATIENT
Start: 2025-05-27

## 2025-05-27 NOTE — TELEPHONE ENCOUNTER
Warfarin 4mg, : TAKE 1 TABLET BY MOUTH DAILY OR AS DIRECTED. Script sent to [facility] for refill. dvLPN

## 2025-05-27 NOTE — PROGRESS NOTES
Patient's INR- 3.7, outside of therapeutic range. This Sn instructed patient to reduce her dose to 2mg on 5/27. Recheck as contracted. dvLPN

## 2025-06-02 LAB — INR PPP: 3.5 (ref 2.5–3.5)

## 2025-06-03 ENCOUNTER — ANTICOAGULATION VISIT (OUTPATIENT)
Dept: CARDIOLOGY | Facility: CLINIC | Age: 50
End: 2025-06-03
Payer: COMMERCIAL

## 2025-06-03 DIAGNOSIS — Z95.2 S/P MVR (MITRAL VALVE REPLACEMENT): Primary | ICD-10-CM

## 2025-06-10 LAB — INR PPP: 3.4

## 2025-06-11 ENCOUNTER — ANTICOAGULATION VISIT (OUTPATIENT)
Dept: CARDIOLOGY | Facility: CLINIC | Age: 50
End: 2025-06-11
Payer: COMMERCIAL

## 2025-06-11 DIAGNOSIS — Z95.2 S/P MVR (MITRAL VALVE REPLACEMENT): Primary | ICD-10-CM

## 2025-06-11 NOTE — PROGRESS NOTES
Patient's INR- 3.4, within therapeutic range. Continue current dosage and recheck as contracted. AbdulazizN

## 2025-06-17 LAB — INR PPP: 4

## 2025-06-18 ENCOUNTER — ANTICOAGULATION VISIT (OUTPATIENT)
Dept: CARDIOLOGY | Facility: CLINIC | Age: 50
End: 2025-06-18
Payer: COMMERCIAL

## 2025-06-18 DIAGNOSIS — F41.0 PANIC DISORDER: Chronic | ICD-10-CM

## 2025-06-18 DIAGNOSIS — Z95.2 S/P MVR (MITRAL VALVE REPLACEMENT): Primary | ICD-10-CM

## 2025-06-18 DIAGNOSIS — F41.1 GENERALIZED ANXIETY DISORDER: Chronic | ICD-10-CM

## 2025-06-18 RX ORDER — ALPRAZOLAM 0.5 MG
0.5 TABLET ORAL 2 TIMES DAILY PRN
Qty: 60 TABLET | Refills: 2 | Status: SHIPPED | OUTPATIENT
Start: 2025-06-18

## 2025-06-18 NOTE — PROGRESS NOTES
Patient INR- 4.0, outside of therapeutic range. This Sn instructed patient to Hold her dose this evening and then resume current dosage. Recheck as contracted. dvLPN

## 2025-06-18 NOTE — TELEPHONE ENCOUNTER
Rx Refill Note  Requested Prescriptions     Pending Prescriptions Disp Refills    ALPRAZolam (XANAX) 0.5 MG tablet 60 tablet 2     Sig: Take 1 tablet by mouth 2 (Two) Times a Day As Needed for Anxiety.        Last office visit with prescribing clinician: 1/15/2025     Next office visit with prescribing clinician: Visit date not found     Office Visit with Tiarra Leyva, APRN (01/15/2025)     ToxAssure Flex 22, Ur w/DL - Urine, Random Void (12/11/2024 14:53)     BEHAVIORAL HEALTH - SCAN - Inspect Ana walsh, 6/18/25 (06/18/2025)     Inspect Fill     Ksenia Chan  06/18/25, 10:29 EDT

## 2025-06-24 LAB — INR PPP: 3.1

## 2025-06-25 ENCOUNTER — ANTICOAGULATION VISIT (OUTPATIENT)
Dept: CARDIOLOGY | Facility: CLINIC | Age: 50
End: 2025-06-25
Payer: COMMERCIAL

## 2025-06-25 DIAGNOSIS — Z95.2 S/P MVR (MITRAL VALVE REPLACEMENT): Primary | ICD-10-CM

## 2025-06-25 NOTE — PROGRESS NOTES
Patient's INR- 3.1, within therapeutic range. Continue current dosage and recheck as contracted. AbdulazizN

## 2025-06-30 LAB — INR PPP: 3.3

## 2025-07-01 ENCOUNTER — ANTICOAGULATION VISIT (OUTPATIENT)
Dept: CARDIOLOGY | Facility: CLINIC | Age: 50
End: 2025-07-01
Payer: COMMERCIAL

## 2025-07-01 DIAGNOSIS — Z95.2 S/P MVR (MITRAL VALVE REPLACEMENT): Primary | ICD-10-CM

## 2025-07-07 LAB — INR PPP: 3.1

## 2025-07-08 ENCOUNTER — ANTICOAGULATION VISIT (OUTPATIENT)
Dept: CARDIOLOGY | Facility: CLINIC | Age: 50
End: 2025-07-08
Payer: COMMERCIAL

## 2025-07-08 DIAGNOSIS — Z95.2 S/P MVR (MITRAL VALVE REPLACEMENT): Primary | ICD-10-CM

## 2025-07-15 LAB — INR PPP: 5.1

## 2025-07-16 ENCOUNTER — ANTICOAGULATION VISIT (OUTPATIENT)
Dept: CARDIOLOGY | Facility: CLINIC | Age: 50
End: 2025-07-16
Payer: COMMERCIAL

## 2025-07-16 ENCOUNTER — TELEPHONE (OUTPATIENT)
Dept: CARDIOLOGY | Facility: CLINIC | Age: 50
End: 2025-07-16
Payer: COMMERCIAL

## 2025-07-16 DIAGNOSIS — Z95.2 S/P MVR (MITRAL VALVE REPLACEMENT): Primary | ICD-10-CM

## 2025-07-16 NOTE — PROGRESS NOTES
Patient's INR- 5.1, outside of therapeutic range. This Sn instructed patient to Hold her dose on 7/16 and reduce her dose on 7/17 to 2mg. Recheck as contracted. dvLPN

## 2025-07-21 ENCOUNTER — ANTICOAGULATION VISIT (OUTPATIENT)
Dept: CARDIOLOGY | Facility: CLINIC | Age: 50
End: 2025-07-21
Payer: COMMERCIAL

## 2025-07-21 DIAGNOSIS — Z95.2 S/P MVR (MITRAL VALVE REPLACEMENT): Primary | ICD-10-CM

## 2025-07-21 LAB — INR PPP: 3.3

## 2025-07-23 DIAGNOSIS — F51.05 INSOMNIA DUE TO MENTAL CONDITION: Chronic | ICD-10-CM

## 2025-07-23 NOTE — TELEPHONE ENCOUNTER
Rx Refill Note  Requested Prescriptions     Pending Prescriptions Disp Refills    traZODone (DESYREL) 100 MG tablet 90 tablet 1     Sig: Take 1 tablet by mouth Every Night.        Last office visit with prescribing clinician: 1/15/2025     Next office visit with prescribing clinician: Visit date not found     Office Visit with Tiarra Leyva APRN (01/15/2025)     Ksenia Chan  07/23/25, 09:25 EDT

## 2025-07-25 RX ORDER — TRAZODONE HYDROCHLORIDE 100 MG/1
100 TABLET ORAL NIGHTLY
Qty: 90 TABLET | Refills: 0 | Status: SHIPPED | OUTPATIENT
Start: 2025-07-25

## 2025-07-28 LAB — INR PPP: 3.1 (ref 2.5–3.5)

## 2025-07-31 ENCOUNTER — ANTICOAGULATION VISIT (OUTPATIENT)
Dept: CARDIOLOGY | Facility: CLINIC | Age: 50
End: 2025-07-31
Payer: COMMERCIAL

## 2025-07-31 DIAGNOSIS — Z95.2 S/P MVR (MITRAL VALVE REPLACEMENT): Primary | ICD-10-CM

## 2025-08-04 LAB — INR PPP: 3.4 (ref 2.5–3.5)

## 2025-08-05 ENCOUNTER — ANTICOAGULATION VISIT (OUTPATIENT)
Dept: CARDIOLOGY | Facility: CLINIC | Age: 50
End: 2025-08-05
Payer: COMMERCIAL

## 2025-08-05 DIAGNOSIS — Z95.2 S/P MVR (MITRAL VALVE REPLACEMENT): Primary | ICD-10-CM

## 2025-08-12 ENCOUNTER — ANTICOAGULATION VISIT (OUTPATIENT)
Dept: CARDIOLOGY | Facility: CLINIC | Age: 50
End: 2025-08-12
Payer: COMMERCIAL

## 2025-08-12 DIAGNOSIS — Z95.2 S/P MVR (MITRAL VALVE REPLACEMENT): Primary | ICD-10-CM

## 2025-08-12 LAB — INR PPP: 6 (ref 2.5–3.5)

## 2025-08-13 ENCOUNTER — APPOINTMENT (OUTPATIENT)
Dept: GENERAL RADIOLOGY | Facility: HOSPITAL | Age: 50
End: 2025-08-13
Payer: COMMERCIAL

## 2025-08-13 ENCOUNTER — HOSPITAL ENCOUNTER (EMERGENCY)
Facility: HOSPITAL | Age: 50
Discharge: HOME | End: 2025-08-13
Payer: COMMERCIAL

## 2025-08-13 VITALS
WEIGHT: 211.42 LBS | SYSTOLIC BLOOD PRESSURE: 111 MMHG | RESPIRATION RATE: 16 BRPM | OXYGEN SATURATION: 96 % | HEIGHT: 68 IN | BODY MASS INDEX: 32.04 KG/M2 | HEART RATE: 104 BPM | DIASTOLIC BLOOD PRESSURE: 82 MMHG | TEMPERATURE: 97.8 F

## 2025-08-13 DIAGNOSIS — S90.32XA CONTUSION OF LEFT FOOT, INITIAL ENCOUNTER: Primary | ICD-10-CM

## 2025-08-13 DIAGNOSIS — R79.1 ELEVATED INR: ICD-10-CM

## 2025-08-13 LAB
INR PPP: 3.83 (ref 2–3)
PROTHROMBIN TIME: 38.4 SECONDS (ref 19.4–28.5)

## 2025-08-13 PROCEDURE — 73630 X-RAY EXAM OF FOOT: CPT

## 2025-08-13 PROCEDURE — 99283 EMERGENCY DEPT VISIT LOW MDM: CPT

## 2025-08-13 PROCEDURE — 36415 COLL VENOUS BLD VENIPUNCTURE: CPT

## 2025-08-13 PROCEDURE — 97161 PT EVAL LOW COMPLEX 20 MIN: CPT | Performed by: PHYSICAL THERAPIST

## 2025-08-13 PROCEDURE — 85610 PROTHROMBIN TIME: CPT | Performed by: NURSE PRACTITIONER

## 2025-08-14 ENCOUNTER — ANTICOAGULATION VISIT (OUTPATIENT)
Dept: CARDIOLOGY | Facility: CLINIC | Age: 50
End: 2025-08-14
Payer: COMMERCIAL

## 2025-08-14 DIAGNOSIS — Z95.2 S/P MVR (MITRAL VALVE REPLACEMENT): Primary | ICD-10-CM

## 2025-08-14 LAB — INR PPP: 3.6

## 2025-08-18 LAB — INR PPP: 2.6 (ref 2.5–3.5)

## 2025-08-19 ENCOUNTER — ANTICOAGULATION VISIT (OUTPATIENT)
Dept: CARDIOLOGY | Facility: CLINIC | Age: 50
End: 2025-08-19
Payer: COMMERCIAL

## 2025-08-19 DIAGNOSIS — Z95.2 S/P MVR (MITRAL VALVE REPLACEMENT): Primary | ICD-10-CM

## 2025-08-26 LAB — INR PPP: 3.7 (ref 2.5–3.5)

## 2025-08-28 ENCOUNTER — ANTICOAGULATION VISIT (OUTPATIENT)
Dept: CARDIOLOGY | Facility: CLINIC | Age: 50
End: 2025-08-28
Payer: COMMERCIAL

## 2025-08-28 DIAGNOSIS — Z95.2 S/P MVR (MITRAL VALVE REPLACEMENT): Primary | ICD-10-CM

## (undated) DEVICE — Device

## (undated) DEVICE — SENSR O2 OXIMAX FNGR A/ 18IN NONSTR

## (undated) DEVICE — SUT SILK 4/0 TIES 18IN A183H

## (undated) DEVICE — ELECTRD DEFIB M/FUNC PROPADZ STRL 2PK

## (undated) DEVICE — SUT ETHIB 2/0 CV V7 30IN 10PK PXX77

## (undated) DEVICE — CANN VENI DLP SGL/STAGE RT/ANGL MTL/TP 28F

## (undated) DEVICE — SUT PROLN 3/0 V7 D/A 36IN 8976H

## (undated) DEVICE — TUBING, SUCTION, 1/4" X 12', STRAIGHT: Brand: MEDLINE

## (undated) DEVICE — 28 FR STRAIGHT – SOFT PVC CATHETER: Brand: PVC THORACIC CATHETERS

## (undated) DEVICE — ADAPT ANTEGRADE RETRGR

## (undated) DEVICE — TBG INSUFF MALE L/L W 12MM CON: Brand: MEDLINE INDUSTRIES, INC.

## (undated) DEVICE — SUT PROLN 7/0 BV1 D/A 30IN 8703H

## (undated) DEVICE — GAUZE,SPONGE,4"X4",32PLY,XRAY,STRL,LF: Brand: MEDLINE

## (undated) DEVICE — PK PERFUS CUST W/CARDIOPLEGIA

## (undated) DEVICE — BG BLD SYS

## (undated) DEVICE — INTENDED FOR TISSUE SEPARATION, AND OTHER PROCEDURES THAT REQUIRE A SHARP SURGICAL BLADE TO PUNCTURE OR CUT.: Brand: BARD-PARKER ® CARBON RIB-BACK BLADES

## (undated) DEVICE — ROTATING SURGICAL PUNCHES, 1 PER POUCH: Brand: A&E MEDICAL / ROTATING SURGICAL PUNCHES

## (undated) DEVICE — SUT PDS 0 CT-1 Z340H

## (undated) DEVICE — ORGANIZER SUT SHELIGH 3T 213013

## (undated) DEVICE — CONTAINER,SPECIMEN,OR STERILE,4OZ: Brand: MEDLINE

## (undated) DEVICE — COUNT NDL MAG XLG

## (undated) DEVICE — PK TRY HEART CATH 50

## (undated) DEVICE — SUT SILK 2 SUTUPAK TIE 60IN SA8H 2STRAND

## (undated) DEVICE — FRCP BX RADJAW4 NDL 2.8 240CM LG OG BX40

## (undated) DEVICE — RADIFOCUS OPTITORQUE ANGIOGRAPHIC CATHETER: Brand: OPTITORQUE

## (undated) DEVICE — DRSNG SLVR/ANTIBAC PRIMASEAL POST/OP ADHS 3.5X10IN

## (undated) DEVICE — TEMP PACING WIRE: Brand: MYO/WIRE

## (undated) DEVICE — CANN O2 ETCO2 FITS ALL CONN CO2 SMPL A/ 7IN DISP LF

## (undated) DEVICE — BITEBLOCK OMNI BLOC

## (undated) DEVICE — DRAPE, RADIAL, STERILE: Brand: MEDLINE

## (undated) DEVICE — TUBING, SUCTION, 1/4" X 10', STRAIGHT: Brand: MEDLINE

## (undated) DEVICE — SUT PROLN 6/0 RB2 D/A 30IN 8711H

## (undated) DEVICE — SUCTION CANISTER, 1500CC,SAFELINER: Brand: DEROYAL

## (undated) DEVICE — SPNG GZ AVANT 6PLY 4X4IN STRL PK/2

## (undated) DEVICE — SOL IRR NACL 0.9PCT BT 1000ML

## (undated) DEVICE — PRESSURE TUBING: Brand: TRUWAVE

## (undated) DEVICE — TP UMB COTN 1/8X36 U12T

## (undated) DEVICE — TOWEL,OR,DSP,ST,WHITE,DLX,4/PK,20PK/CS: Brand: MEDLINE

## (undated) DEVICE — SUT SILK 2/0 TIES 18IN A185H

## (undated) DEVICE — SENSR CERBRL O2 PK/2

## (undated) DEVICE — SPONGE,DISSECTOR,ROUND CHERRY,XR,ST,5/PK: Brand: MEDLINE

## (undated) DEVICE — IRRIGATOR BULB ASEPTO 60CC STRL

## (undated) DEVICE — SYS PERFUS SEP PLATLT W TIPS CUST

## (undated) DEVICE — ELECTRD BLD EZ CLN STD 6.5IN

## (undated) DEVICE — BLOOD TRANSFUSION FILTER: Brand: HAEMONETICS

## (undated) DEVICE — CATHETER,FOLEY,100% SIL,COUDE,18FR 10ML: Brand: MEDLINE

## (undated) DEVICE — CABL BIPOL W/ALLGTR CLIP/SM 12FT

## (undated) DEVICE — SUT PROLENE PP 7/0 BV175-6 24IN

## (undated) DEVICE — ANTIBACTERIAL UNDYED BRAIDED (POLYGLACTIN 910), SYNTHETIC ABSORBABLE SUTURE: Brand: COATED VICRYL

## (undated) DEVICE — HEMOCONCENTRATOR PERFUS LPS06

## (undated) DEVICE — CANN AORT ROOT DLP VNT/8IN 14G 7F

## (undated) DEVICE — DRAPE SHEET ULTRAGARD: Brand: MEDLINE

## (undated) DEVICE — SUT SILK 2/0 SH CR8 18IN CR8 C012D

## (undated) DEVICE — BOOT SUT XRAY DETECT STD YEL/BLU CA/50

## (undated) DEVICE — SUT SILK 0 CT1 CR8 18IN C021D

## (undated) DEVICE — CONNECT Y INTERSEPT W/LL 3/8 X 3/8 X 3/8IN

## (undated) DEVICE — GW EMR FIX EXCHG J STD .035 3MM 260CM

## (undated) DEVICE — SUT PROLN 4/0 V7 36IN 8975H

## (undated) DEVICE — PK ATS CUST W CARDIOTOMY RESEVOIR

## (undated) DEVICE — 28 FR RIGHT ANGLE – SOFT PVC CATHETER: Brand: PVC THORACIC CATHETERS

## (undated) DEVICE — SNAR POLYP SENSATION STDOVL 27 240 BX40

## (undated) DEVICE — SOL IRR H2O BTL 1000ML STRL

## (undated) DEVICE — CANN RETRGR STYLET RSCP 15F

## (undated) DEVICE — SINGLE STAGE VENOUS RETURN CANNULA: Brand: THIN-FLEX SINGLE STAGE VENOUS DRAINAGE CANNULA

## (undated) DEVICE — SUT PROLN 5/0 V5 36IN 8934H

## (undated) DEVICE — KT ORCA ORCAPOD DISP STRL

## (undated) DEVICE — BLD SCLPL BEAVR MINI STR 2BVL 180D LF

## (undated) DEVICE — SOL NACL 0.9PCT 1000ML

## (undated) DEVICE — SUT PROLN 4/0 V5 36IN 8935H

## (undated) DEVICE — BLOWER MISTER CLEARVIEW W/TBG

## (undated) DEVICE — GLIDESHEATH SLENDER STAINLESS STEEL KIT: Brand: GLIDESHEATH SLENDER

## (undated) DEVICE — CANN ART EOPA 3D NV W/CONN 20F

## (undated) DEVICE — SINGLE USE LIGATING DEVICE: Brand: SINGLE USE LIGATING DEVICE

## (undated) DEVICE — PK OPN HEART WHT WRP 50

## (undated) DEVICE — ADAPT CLN BIOGUARD AIR/H2O DISP

## (undated) DEVICE — LN SMPL CO2 SHTRM SD STREAM W/M LUER

## (undated) DEVICE — TR BAND RADIAL ARTERY COMPRESSION DEVICE: Brand: TR BAND